# Patient Record
Sex: FEMALE | Race: WHITE | Employment: FULL TIME | URBAN - NONMETROPOLITAN AREA
[De-identification: names, ages, dates, MRNs, and addresses within clinical notes are randomized per-mention and may not be internally consistent; named-entity substitution may affect disease eponyms.]

---

## 2017-03-23 ENCOUNTER — MED REC SCAN ONLY (OUTPATIENT)
Dept: FAMILY MEDICINE CLINIC | Facility: CLINIC | Age: 62
End: 2017-03-23

## 2017-04-27 ENCOUNTER — TELEPHONE (OUTPATIENT)
Dept: FAMILY MEDICINE CLINIC | Facility: CLINIC | Age: 62
End: 2017-04-27

## 2017-04-27 DIAGNOSIS — Z12.39 ENCOUNTER FOR SCREENING BREAST EXAMINATION: Primary | ICD-10-CM

## 2017-04-27 DIAGNOSIS — Z12.31 ENCOUNTER FOR SCREENING MAMMOGRAM FOR BREAST CANCER: ICD-10-CM

## 2017-05-10 ENCOUNTER — APPOINTMENT (OUTPATIENT)
Dept: LAB | Age: 62
End: 2017-05-10
Attending: FAMILY MEDICINE
Payer: COMMERCIAL

## 2017-05-10 DIAGNOSIS — E11.9 DIABETES MELLITUS WITHOUT COMPLICATION (HCC): ICD-10-CM

## 2017-05-10 DIAGNOSIS — E11.9 TYPE 2 DIABETES MELLITUS WITHOUT COMPLICATION (HCC): ICD-10-CM

## 2017-05-10 DIAGNOSIS — E11.9 DIABETES MELLITUS WITHOUT COMPLICATION (HCC): Primary | ICD-10-CM

## 2017-05-10 PROCEDURE — 36415 COLL VENOUS BLD VENIPUNCTURE: CPT | Performed by: FAMILY MEDICINE

## 2017-05-11 NOTE — PROGRESS NOTES
Quick Note:    Notify urinary microalbumin is normal.   This test is an early indicator of kidney disease. Repeat in 1 year. Hemoglobin A1c is elevated. I believe Lencho Dong has an appointment scheduled soon.  If not needs to make one.  ______

## 2017-05-12 ENCOUNTER — TELEPHONE (OUTPATIENT)
Dept: FAMILY MEDICINE CLINIC | Facility: CLINIC | Age: 62
End: 2017-05-12

## 2017-05-12 NOTE — TELEPHONE ENCOUNTER
Calling the patient with her results- she is coming in Monday   She knew her A1C was going to be bad

## 2017-05-15 ENCOUNTER — APPOINTMENT (OUTPATIENT)
Dept: LAB | Age: 62
End: 2017-05-15
Attending: FAMILY MEDICINE
Payer: COMMERCIAL

## 2017-05-15 ENCOUNTER — OFFICE VISIT (OUTPATIENT)
Dept: FAMILY MEDICINE CLINIC | Facility: CLINIC | Age: 62
End: 2017-05-15

## 2017-05-15 ENCOUNTER — TELEPHONE (OUTPATIENT)
Dept: FAMILY MEDICINE CLINIC | Facility: CLINIC | Age: 62
End: 2017-05-15

## 2017-05-15 VITALS
HEART RATE: 80 BPM | SYSTOLIC BLOOD PRESSURE: 120 MMHG | DIASTOLIC BLOOD PRESSURE: 70 MMHG | TEMPERATURE: 98 F | HEIGHT: 62 IN | BODY MASS INDEX: 35.61 KG/M2 | WEIGHT: 193.5 LBS | OXYGEN SATURATION: 97 %

## 2017-05-15 DIAGNOSIS — E11.9 TYPE 2 DIABETES MELLITUS WITHOUT COMPLICATION, UNSPECIFIED LONG TERM INSULIN USE STATUS: Primary | ICD-10-CM

## 2017-05-15 DIAGNOSIS — IMO0001 UNCONTROLLED TYPE 2 DIABETES MELLITUS WITHOUT COMPLICATION, WITH LONG-TERM CURRENT USE OF INSULIN: Primary | ICD-10-CM

## 2017-05-15 DIAGNOSIS — K42.9 UMBILICAL HERNIA WITHOUT OBSTRUCTION AND WITHOUT GANGRENE: ICD-10-CM

## 2017-05-15 DIAGNOSIS — IMO0001 UNCONTROLLED TYPE 2 DIABETES MELLITUS WITHOUT COMPLICATION, WITH LONG-TERM CURRENT USE OF INSULIN: ICD-10-CM

## 2017-05-15 PROCEDURE — 36415 COLL VENOUS BLD VENIPUNCTURE: CPT

## 2017-05-15 PROCEDURE — 99214 OFFICE O/P EST MOD 30 MIN: CPT | Performed by: FAMILY MEDICINE

## 2017-05-15 PROCEDURE — 80053 COMPREHEN METABOLIC PANEL: CPT

## 2017-05-15 PROCEDURE — 36415 COLL VENOUS BLD VENIPUNCTURE: CPT | Performed by: FAMILY MEDICINE

## 2017-05-15 PROCEDURE — 80061 LIPID PANEL: CPT

## 2017-05-15 RX ORDER — LISINOPRIL 5 MG/1
5 TABLET ORAL DAILY
COMMUNITY
End: 2017-05-15

## 2017-05-15 RX ORDER — GLIPIZIDE 5 MG/1
5 TABLET ORAL
COMMUNITY
End: 2017-05-15 | Stop reason: ALTCHOICE

## 2017-05-15 RX ORDER — LORATADINE 10 MG/1
10 TABLET ORAL AS NEEDED
Status: ON HOLD | COMMUNITY
End: 2017-07-03

## 2017-05-15 RX ORDER — METOPROLOL TARTRATE 50 MG/1
50 TABLET, FILM COATED ORAL 2 TIMES DAILY
COMMUNITY
End: 2017-05-15

## 2017-05-15 RX ORDER — LISINOPRIL 5 MG/1
5 TABLET ORAL DAILY
Qty: 90 TABLET | Status: SHIPPED | OUTPATIENT
Start: 2017-05-15 | End: 2017-12-06

## 2017-05-15 RX ORDER — METFORMIN HYDROCHLORIDE EXTENDED-RELEASE TABLETS 1000 MG/1
TABLET, FILM COATED, EXTENDED RELEASE ORAL
Qty: 60 TABLET | Refills: 5 | Status: SHIPPED | OUTPATIENT
Start: 2017-05-15 | End: 2017-05-19 | Stop reason: CLARIF

## 2017-05-15 RX ORDER — ALPRAZOLAM 0.25 MG/1
0.25 TABLET ORAL EVERY 6 HOURS PRN
Qty: 30 TABLET | Refills: 0 | Status: SHIPPED | OUTPATIENT
Start: 2017-05-15 | End: 2021-03-23 | Stop reason: ALTCHOICE

## 2017-05-15 RX ORDER — METOPROLOL TARTRATE 50 MG/1
50 TABLET, FILM COATED ORAL 2 TIMES DAILY
Qty: 180 TABLET | Refills: 3 | Status: SHIPPED | OUTPATIENT
Start: 2017-05-15 | End: 2017-12-06

## 2017-05-15 RX ORDER — MAG HYDROX/ALUMINUM HYD/SIMETH 400-400-40
5000 SUSPENSION, ORAL (FINAL DOSE FORM) ORAL DAILY
COMMUNITY
End: 2018-03-06 | Stop reason: ALTCHOICE

## 2017-05-15 NOTE — TELEPHONE ENCOUNTER
NEED REFERRAL FOR MAMMO SINCE SHE HAS HMO, SHE WENT TO HAVE IT DONE TODAY & THEY CAN NOT DO IT SINCE THERE IS NO REFERRAL, PLEASE SEND THIS OVER SO SHE CAN RESCHED MAMMO    Order is in the chart- not sure where the patient is going?   Calling the patient- l

## 2017-05-15 NOTE — PROGRESS NOTES
Jet Walter is a 63year old female. Patient presents with: Other: BP check, DM check, fasting labs, due for foot exam, URINE micro. ...room 2      HPI:   Patient's A1c was elevated at 9.7. She had been on Metformin and glipizide.   In the past she had 01/01/2002    Smokeless Status: Never Used                        Comment: QUIT SMOKING IN 2002    Alcohol Use: No                 REVIEW OF SYSTEMS:   GENERAL HEALTH: feels well otherwise  SKIN: denies any unusual skin lesions or rashes  RESPIRATORY: lenny ASSESSMENT AND PLAN:     Uncontrolled type 2 diabetes mellitus without complication, with long-term current use of insulin (hcc)  (primary encounter diagnosis)  Umbilical hernia without obstruction and without gangrene    Recommend stopping the

## 2017-05-15 NOTE — TELEPHONE ENCOUNTER
WAS PUT ON INSULIN, IS DR TAKING HER OFF METFORMIN? PHARMACY DID NOT HAVE METFORMIN FOR HER TO ? Refill was not requested for the metformin previously.  Sending into [facility]

## 2017-05-16 ENCOUNTER — HOSPITAL ENCOUNTER (OUTPATIENT)
Dept: MAMMOGRAPHY | Age: 62
Discharge: HOME | End: 2017-05-16
Attending: FAMILY MEDICINE
Payer: COMMERCIAL

## 2017-05-16 DIAGNOSIS — Z12.31 ENCOUNTER FOR SCREENING MAMMOGRAM FOR BREAST CANCER: ICD-10-CM

## 2017-05-16 PROCEDURE — 77067 SCR MAMMO BI INCL CAD: CPT | Performed by: FAMILY MEDICINE

## 2017-05-16 NOTE — PROGRESS NOTES
Quick Note:    Notify lipids normal. Recheck in 6 months. Notify chem normal including kidney function, liver function, electrolytes and nutritional markers.   Recheck comprehensive panel in 6 months.    ______

## 2017-05-17 ENCOUNTER — TELEPHONE (OUTPATIENT)
Dept: FAMILY MEDICINE CLINIC | Facility: CLINIC | Age: 62
End: 2017-05-17

## 2017-05-17 NOTE — TELEPHONE ENCOUNTER
I called the patient and she advised that she picked up her metformin and it is different than what she has been taking. It was more expensive and they could only give her a partial fill.        The refill I sent was what was in the chart and per the [facility]

## 2017-05-18 NOTE — TELEPHONE ENCOUNTER
I called the patient and spoke to her - he advised that she will not be available until tomorrow   I asked that he have her call me when she can

## 2017-05-19 NOTE — TELEPHONE ENCOUNTER
I called [facility]- spoke to [facility]- she confirmed that the patient previously was on the regular Metformin 1000mg BID  I corrected our list and sent a new script

## 2017-05-19 NOTE — TELEPHONE ENCOUNTER
I called Vivienne Fatima and I explained.  She advised that Dr Дмитрий Mitchell is with the [facility] but she thought that he was prescribing the metformin ER- she wrote it down (she didn't save the bottle)  She did not  the script that we sent in so she is asking that we lorena

## 2017-05-22 ENCOUNTER — NURSE ONLY (OUTPATIENT)
Dept: ENDOCRINOLOGY CLINIC | Facility: CLINIC | Age: 62
End: 2017-05-22

## 2017-05-22 VITALS — WEIGHT: 190.13 LBS | DIASTOLIC BLOOD PRESSURE: 58 MMHG | BODY MASS INDEX: 35 KG/M2 | SYSTOLIC BLOOD PRESSURE: 102 MMHG

## 2017-05-22 DIAGNOSIS — IMO0001 UNCONTROLLED TYPE 2 DIABETES MELLITUS WITHOUT COMPLICATION, WITH LONG-TERM CURRENT USE OF INSULIN: Primary | ICD-10-CM

## 2017-05-22 PROCEDURE — G0108 DIAB MANAGE TRN  PER INDIV: HCPCS | Performed by: DIETITIAN, REGISTERED

## 2017-05-22 RX ORDER — FLUOCINONIDE 1 MG/G
CREAM TOPICAL
COMMUNITY
End: 2017-06-19

## 2017-05-22 RX ORDER — METFORMIN HYDROCHLORIDE 500 MG/1
500 TABLET, EXTENDED RELEASE ORAL
Qty: 120 TABLET | Refills: 6 | Status: SHIPPED | OUTPATIENT
Start: 2017-05-22 | End: 2017-06-19

## 2017-05-22 RX ORDER — FLUOCINONIDE 0.5 MG/ML
SOLUTION TOPICAL
Qty: 1 BOTTLE | Refills: 0 | COMMUNITY
Start: 2017-05-22 | End: 2019-08-20

## 2017-05-22 NOTE — PROGRESS NOTES
Doraandi Walter  : [id number] attended Step 1 Diabetic Education:    Date: 2017   Start time: 11:00am End time: 12:30pm    /58 mmHg  Wt 190 lb 1.6 oz      HEMOGLOBIN A1C (no units)   Date Value   2015 7.9   ----------  HGBA1C (%)   Date Jaclyn Pre-meal  2 Hour Post-prandial 140-180 -160  Demonstrated ability to perform blood glucose testing on: [employer] Contour Next EZ  Glucose today was 214 mg/dL (3 hrs post-breakfast)     Problem Solving: Prevention,detection and treatment of acute co

## 2017-06-05 ENCOUNTER — NURSE ONLY (OUTPATIENT)
Dept: ENDOCRINOLOGY CLINIC | Facility: CLINIC | Age: 62
End: 2017-06-05

## 2017-06-05 ENCOUNTER — TELEPHONE (OUTPATIENT)
Dept: ENDOCRINOLOGY CLINIC | Facility: CLINIC | Age: 62
End: 2017-06-05

## 2017-06-05 DIAGNOSIS — IMO0001 UNCONTROLLED TYPE 2 DIABETES MELLITUS WITHOUT COMPLICATION, WITHOUT LONG-TERM CURRENT USE OF INSULIN: Primary | ICD-10-CM

## 2017-06-05 PROCEDURE — G0109 DIAB MANAGE TRN IND/GROUP: HCPCS | Performed by: DIETITIAN, REGISTERED

## 2017-06-05 NOTE — TELEPHONE ENCOUNTER
Diego Huerta- I talked with Dr Teresita Cordoba is concerned because this medication is not covered by the insurance and we not supposed to be prescribing medications that are not covered.   Even though she can use the discount card he is not sure if this will become a

## 2017-06-05 NOTE — TELEPHONE ENCOUNTER
Pt. here for class today ; copies of her BG readings are as follows:    Date Breakfast   Lunch    Dinner   Bedtime Comments    Pre Insulin Units Post Pre Insulin Units Post Pre  Insulin Units Post     5/10/17 232             5/17 217  235      262     5/18

## 2017-06-09 ENCOUNTER — TELEPHONE (OUTPATIENT)
Dept: ENDOCRINOLOGY CLINIC | Facility: CLINIC | Age: 62
End: 2017-06-09

## 2017-06-09 NOTE — TELEPHONE ENCOUNTER
Contacted pt.'s pharmacy plan to determine cost of Glyxambi 25-5 mg; for a 30-day supply it would cost $159.37. I called pt. & instructed her to take the discount card to the pharmacy with her to check on the final cost of the prescription.  Sent prescrip

## 2017-06-16 ENCOUNTER — OFFICE VISIT (OUTPATIENT)
Dept: FAMILY MEDICINE CLINIC | Facility: CLINIC | Age: 62
End: 2017-06-16

## 2017-06-16 VITALS
RESPIRATION RATE: 14 BRPM | SYSTOLIC BLOOD PRESSURE: 102 MMHG | WEIGHT: 190 LBS | HEART RATE: 90 BPM | BODY MASS INDEX: 35 KG/M2 | DIASTOLIC BLOOD PRESSURE: 60 MMHG | TEMPERATURE: 98 F | OXYGEN SATURATION: 97 %

## 2017-06-16 DIAGNOSIS — K42.9 UMBILICAL HERNIA WITHOUT OBSTRUCTION AND WITHOUT GANGRENE: Primary | ICD-10-CM

## 2017-06-16 PROCEDURE — 99243 OFF/OP CNSLTJ NEW/EST LOW 30: CPT | Performed by: SURGERY

## 2017-06-16 NOTE — PROGRESS NOTES
Roro Estes is a 63year old female  No chief complaint on file. REFERRED BY    Patient presents with umbilical hernia present for many months. Patient states she noticed a bulge when she was bathing.   States the bulge comes and goes she sought the times daily Disp: 150 strip Rfl: 11   RIZWAN MICROLET LANCETS Does not apply Misc Test 4 times daily Disp: 2 Box Rfl: 11   MetFORMIN HCl 1000 MG Oral Tab Take 1 tablet (1,000 mg total) by mouth 2 (two) times daily with meals.  Disp: 180 tablet Rfl: 1   flyat fever or chills  SKIN: denies any unusual skin rashes or jaundice  HEENT: denies nasal congestion, sinus pain or sore throat; hearing loss negative,   EYES , no diplopia or vision changes  RESPIRATORY: denies shortness of breath, wheezing or cough   CARDIO Moderate CHD risk    45-60 mg/dL  Average CHD risk    60-75 md/dL  Below average CHD risk       LDL Cholesterol Date: 05/15/2017   Value: 65  Ref Range <130 mg/dL Status: Final   VLDL Date: 05/15/2017   Value: 24  Ref Range 5-40 mg/dL Status: Final   Chol/ 14-54 U/L Status: Final   Bilirubin, Total Date: 05/15/2017   Value: 0.3  Ref Range 0.1-2.0 mg/dL Status: Final   Total Protein Date: 05/15/2017   Value: 7.5  Ref Range 6.1-8.3 g/dL Status: Final   Albumin Date: 05/15/2017   Value: 3.8  Ref Range 3.5-4.8 g

## 2017-06-19 ENCOUNTER — TELEPHONE (OUTPATIENT)
Dept: SURGERY | Facility: CLINIC | Age: 62
End: 2017-06-19

## 2017-06-19 DIAGNOSIS — K42.9 UMBILICAL HERNIA WITHOUT OBSTRUCTION OR GANGRENE: Primary | ICD-10-CM

## 2017-06-21 ENCOUNTER — TELEPHONE (OUTPATIENT)
Dept: FAMILY MEDICINE CLINIC | Facility: CLINIC | Age: 62
End: 2017-06-21

## 2017-06-21 DIAGNOSIS — K42.9 UMBILICAL HERNIA WITHOUT OBSTRUCTION AND WITHOUT GANGRENE: Primary | ICD-10-CM

## 2017-06-27 ENCOUNTER — NURSE ONLY (OUTPATIENT)
Dept: ENDOCRINOLOGY CLINIC | Facility: CLINIC | Age: 62
End: 2017-06-27

## 2017-06-27 PROCEDURE — G0109 DIAB MANAGE TRN IND/GROUP: HCPCS | Performed by: DIETITIAN, REGISTERED

## 2017-06-28 ENCOUNTER — TELEPHONE (OUTPATIENT)
Dept: ENDOCRINOLOGY CLINIC | Facility: CLINIC | Age: 62
End: 2017-06-28

## 2017-06-28 ENCOUNTER — TELEPHONE (OUTPATIENT)
Dept: FAMILY MEDICINE CLINIC | Facility: CLINIC | Age: 62
End: 2017-06-28

## 2017-06-28 NOTE — TELEPHONE ENCOUNTER
Pt. came for Step 3 class; readings are as follows:    Date Breakfast   Lunch    Dinner   Bedtime Comments    Pre Insulin Units Post Pre Insulin Units Post Pre  Insulin Units Post     6/7/17 245  324           6/9 307         303    6/10 261             6/

## 2017-06-30 ENCOUNTER — NURSE ONLY (OUTPATIENT)
Dept: FAMILY MEDICINE CLINIC | Facility: CLINIC | Age: 62
End: 2017-06-30

## 2017-06-30 DIAGNOSIS — Z01.818 PRE-OP TESTING: Primary | ICD-10-CM

## 2017-06-30 PROCEDURE — 93000 ELECTROCARDIOGRAM COMPLETE: CPT | Performed by: FAMILY MEDICINE

## 2017-07-01 ENCOUNTER — ANESTHESIA EVENT (OUTPATIENT)
Dept: SURGERY | Facility: HOSPITAL | Age: 62
End: 2017-07-01
Payer: COMMERCIAL

## 2017-07-03 ENCOUNTER — ANESTHESIA (OUTPATIENT)
Dept: SURGERY | Facility: HOSPITAL | Age: 62
End: 2017-07-03
Payer: COMMERCIAL

## 2017-07-03 ENCOUNTER — SURGERY (OUTPATIENT)
Age: 62
End: 2017-07-03

## 2017-07-03 ENCOUNTER — HOSPITAL ENCOUNTER (OUTPATIENT)
Facility: HOSPITAL | Age: 62
Setting detail: HOSPITAL OUTPATIENT SURGERY
Discharge: HOME | End: 2017-07-03
Attending: SURGERY | Admitting: SURGERY
Payer: COMMERCIAL

## 2017-07-03 VITALS
RESPIRATION RATE: 18 BRPM | BODY MASS INDEX: 34.6 KG/M2 | TEMPERATURE: 99 F | HEART RATE: 68 BPM | SYSTOLIC BLOOD PRESSURE: 106 MMHG | DIASTOLIC BLOOD PRESSURE: 49 MMHG | HEIGHT: 62 IN | OXYGEN SATURATION: 93 % | WEIGHT: 188 LBS

## 2017-07-03 DIAGNOSIS — K42.9 UMBILICAL HERNIA WITHOUT OBSTRUCTION OR GANGRENE: ICD-10-CM

## 2017-07-03 LAB
GLUCOSE BLD-MCNC: 132 MG/DL (ref 65–99)
GLUCOSE BLD-MCNC: 159 MG/DL (ref 65–99)

## 2017-07-03 PROCEDURE — 82962 GLUCOSE BLOOD TEST: CPT

## 2017-07-03 PROCEDURE — 0WUF0JZ SUPPLEMENT ABDOMINAL WALL WITH SYNTHETIC SUBSTITUTE, OPEN APPROACH: ICD-10-PCS | Performed by: SURGERY

## 2017-07-03 DEVICE — VENTRALEX ST HERNIA PATCH
Type: IMPLANTABLE DEVICE | Site: UMBILICAL | Status: FUNCTIONAL
Brand: VENTRALEX ST HERNIA PATCH

## 2017-07-03 RX ORDER — DEXTROSE MONOHYDRATE 25 G/50ML
50 INJECTION, SOLUTION INTRAVENOUS
Status: DISCONTINUED | OUTPATIENT
Start: 2017-07-03 | End: 2017-07-03 | Stop reason: HOSPADM

## 2017-07-03 RX ORDER — NALOXONE HYDROCHLORIDE 0.4 MG/ML
80 INJECTION, SOLUTION INTRAMUSCULAR; INTRAVENOUS; SUBCUTANEOUS AS NEEDED
Status: DISCONTINUED | OUTPATIENT
Start: 2017-07-03 | End: 2017-07-03

## 2017-07-03 RX ORDER — SODIUM CHLORIDE, SODIUM LACTATE, POTASSIUM CHLORIDE, CALCIUM CHLORIDE 600; 310; 30; 20 MG/100ML; MG/100ML; MG/100ML; MG/100ML
INJECTION, SOLUTION INTRAVENOUS CONTINUOUS
Status: DISCONTINUED | OUTPATIENT
Start: 2017-07-03 | End: 2017-07-03

## 2017-07-03 RX ORDER — HYDROCODONE BITARTRATE AND ACETAMINOPHEN 5; 325 MG/1; MG/1
1 TABLET ORAL EVERY 6 HOURS PRN
Qty: 30 TABLET | Refills: 0 | Status: SHIPPED | OUTPATIENT
Start: 2017-07-03 | End: 2017-12-06 | Stop reason: ALTCHOICE

## 2017-07-03 RX ORDER — METOCLOPRAMIDE HYDROCHLORIDE 5 MG/ML
10 INJECTION INTRAMUSCULAR; INTRAVENOUS AS NEEDED
Status: DISCONTINUED | OUTPATIENT
Start: 2017-07-03 | End: 2017-07-03

## 2017-07-03 RX ORDER — HYDROMORPHONE HYDROCHLORIDE 1 MG/ML
0.4 INJECTION, SOLUTION INTRAMUSCULAR; INTRAVENOUS; SUBCUTANEOUS EVERY 5 MIN PRN
Status: DISCONTINUED | OUTPATIENT
Start: 2017-07-03 | End: 2017-07-03

## 2017-07-03 RX ORDER — ONDANSETRON 2 MG/ML
4 INJECTION INTRAMUSCULAR; INTRAVENOUS AS NEEDED
Status: DISCONTINUED | OUTPATIENT
Start: 2017-07-03 | End: 2017-07-03

## 2017-07-03 RX ORDER — SCOLOPAMINE TRANSDERMAL SYSTEM 1 MG/1
PATCH, EXTENDED RELEASE TRANSDERMAL
Status: COMPLETED
Start: 2017-07-03 | End: 2017-07-03

## 2017-07-03 RX ORDER — HYDROCODONE BITARTRATE AND ACETAMINOPHEN 5; 325 MG/1; MG/1
1 TABLET ORAL AS NEEDED
Status: DISCONTINUED | OUTPATIENT
Start: 2017-07-03 | End: 2017-07-03

## 2017-07-03 RX ORDER — HEPARIN SODIUM 5000 [USP'U]/ML
5000 INJECTION, SOLUTION INTRAVENOUS; SUBCUTANEOUS ONCE
Status: COMPLETED | OUTPATIENT
Start: 2017-07-03 | End: 2017-07-03

## 2017-07-03 RX ORDER — MIDAZOLAM HYDROCHLORIDE 1 MG/ML
1 INJECTION INTRAMUSCULAR; INTRAVENOUS EVERY 5 MIN PRN
Status: DISCONTINUED | OUTPATIENT
Start: 2017-07-03 | End: 2017-07-03

## 2017-07-03 RX ORDER — HEPARIN SODIUM 5000 [USP'U]/ML
INJECTION, SOLUTION INTRAVENOUS; SUBCUTANEOUS
Status: DISCONTINUED
Start: 2017-07-03 | End: 2017-07-03

## 2017-07-03 RX ORDER — BUPIVACAINE HYDROCHLORIDE 5 MG/ML
INJECTION, SOLUTION EPIDURAL; INTRACAUDAL AS NEEDED
Status: DISCONTINUED | OUTPATIENT
Start: 2017-07-03 | End: 2017-07-03 | Stop reason: HOSPADM

## 2017-07-03 RX ORDER — DEXTROSE MONOHYDRATE 25 G/50ML
50 INJECTION, SOLUTION INTRAVENOUS
Status: DISCONTINUED | OUTPATIENT
Start: 2017-07-03 | End: 2017-07-03

## 2017-07-03 RX ORDER — SCOLOPAMINE TRANSDERMAL SYSTEM 1 MG/1
1 PATCH, EXTENDED RELEASE TRANSDERMAL ONCE
Status: DISCONTINUED | OUTPATIENT
Start: 2017-07-03 | End: 2017-07-03 | Stop reason: HOSPADM

## 2017-07-03 RX ORDER — HYDROCODONE BITARTRATE AND ACETAMINOPHEN 5; 325 MG/1; MG/1
2 TABLET ORAL AS NEEDED
Status: DISCONTINUED | OUTPATIENT
Start: 2017-07-03 | End: 2017-07-03

## 2017-07-03 RX ORDER — MEPERIDINE HYDROCHLORIDE 25 MG/ML
12.5 INJECTION INTRAMUSCULAR; INTRAVENOUS; SUBCUTANEOUS AS NEEDED
Status: DISCONTINUED | OUTPATIENT
Start: 2017-07-03 | End: 2017-07-03

## 2017-07-03 RX ORDER — HYDROMORPHONE HYDROCHLORIDE 1 MG/ML
INJECTION, SOLUTION INTRAMUSCULAR; INTRAVENOUS; SUBCUTANEOUS
Status: COMPLETED
Start: 2017-07-03 | End: 2017-07-03

## 2017-07-03 RX ORDER — CEFAZOLIN SODIUM 1 G/3ML
INJECTION, POWDER, FOR SOLUTION INTRAMUSCULAR; INTRAVENOUS
Status: DISCONTINUED | OUTPATIENT
Start: 2017-07-03 | End: 2017-07-03 | Stop reason: HOSPADM

## 2017-07-03 NOTE — BRIEF OP NOTE
Pre-Operative Diagnosis: Umbilical hernia without obstruction or gangrene [K42.9]     Post-Operative Diagnosis: Umbilical hernia without obstruction or gangrene [K42.9]     Procedure Performed:   Procedure(s):   UMBILICAL HERNIORRHAPHY WITH MESH    Surge

## 2017-07-03 NOTE — ANESTHESIA POSTPROCEDURE EVALUATION
Paulette Patient Status:  Hospital Outpatient Surgery   Age/Gender 63year old female MRN [id number]   [facility] SURGERY Attending Hank Cespedes, [address] [facility] Day # 0 PCP Pily Sotelo DO       Anesthesia Post-op N

## 2017-07-03 NOTE — H&P
Patient presents with umbilical hernia present for many months. Patient states she noticed a bulge when she was bathing. States the bulge comes and goes she sought the attention of Dr. Vanice Gaucher who identified it is an umbilical hernia.   Patient states t 150 strip Rfl: 11   RIZWAN MICROLET LANCETS Does not apply Misc Test 4 times daily Disp: 2 Box Rfl: 11   MetFORMIN HCl 1000 MG Oral Tab Take 1 tablet (1,000 mg total) by mouth 2 (two) times daily with meals.  Disp: 180 tablet Rfl: 1   loratadine 10 MG Oral T weight loss, no fever or chills  SKIN: denies any unusual skin rashes or jaundice  HEENT: denies nasal congestion, sinus pain or sore throat; hearing loss negative,   EYES , no diplopia or vision changes  RESPIRATORY: denies shortness of breath, wheezing o High CHD risk    35-45 mg/dL  Moderate CHD risk    45-60 mg/dL  Average CHD risk    60-75 md/dL  Below average CHD risk      LDL Cholesterol Date: 05/15/2017   Value: 65  Ref Range <130 mg/dL Status: Final   VLDL Date: 05/15/2017   Value: 24  Ref Range 5-4 Value: 9.4  Ref Range 8.3-10.3 mg/dL Status: Final     Comment:   Total Calciums are not corrected for effects of low albumin.  If needed, use the following correction formula.       Corrected Calcium Formula:      ((4.0 - Albumin) x 0.8 + Calcium    Note

## 2017-07-03 NOTE — OPERATIVE REPORT
[facility]    PATIENT'S NAME: Austin Li   ATTENDING PHYSICIAN: Valeri Ca D.O.   OPERATING PHYSICIAN: Valeri Ca D.O.   PATIENT ACCOUNT#:   [de-identified]    LOCATION:  PACU [facility] PACU 3 EDWP 10  MEDICAL RECORD #:   [id number]       DATE OF BIRTH the procedure. Sterile dressing was applied. The patient was transported from the operative suite to recovery area in stable condition.       Dictated By Ирина Reyes D.O.  d: 07/03/2017 10:07:48  t: 07/03/2017 11:24:04  Job [id number]  JARTIE/

## 2017-07-03 NOTE — ANESTHESIA PREPROCEDURE EVALUATION
PRE-OP EVALUATION    Patient Name: Farideh Carrera    Pre-op Diagnosis: Umbilical hernia without obstruction or gangrene [K42.9]    Procedure(s):   UMBILICAL HERNIORRHAPHY, POSSIBLE MESH    Surgeon(s) and Role:     Erlin Wolf, DO - Primary    Pre-op vit HYSTERECTOMY      Comment: lise, bso  No date: LUMPECTOMY LEFT      Comment: two can't remember when both benign   No date: OTHER      Comment: palate surgery for sleep apnea  No date: TONSILLECTOMY     Smoking status: Former Smoker  1.00 Packs/day  For 30.

## 2017-07-05 ENCOUNTER — TELEPHONE (OUTPATIENT)
Dept: FAMILY MEDICINE CLINIC | Facility: CLINIC | Age: 62
End: 2017-07-05

## 2017-07-05 RX ORDER — FUROSEMIDE 20 MG/1
TABLET ORAL
Qty: 2 TABLET | Refills: 0 | Status: SHIPPED | OUTPATIENT
Start: 2017-07-05 | End: 2017-12-06 | Stop reason: ALTCHOICE

## 2017-07-05 NOTE — TELEPHONE ENCOUNTER
I spoke to the pt. She is s/p umbilical hernia on 6/3/08. She noticed this morning after she got up her feet are swollen. Pt states her calves are a little swollen. Pt states she was up on her feet a lot yesterday.  She has been trying to put her feet up to

## 2017-07-05 NOTE — TELEPHONE ENCOUNTER
Had surgery on Monday, and today her feet and legs are really swollen. Wants to know if this is normal because of surgery or if she should go to doctor?

## 2017-07-11 ENCOUNTER — OFFICE VISIT (OUTPATIENT)
Dept: FAMILY MEDICINE CLINIC | Facility: CLINIC | Age: 62
End: 2017-07-11

## 2017-07-11 VITALS — DIASTOLIC BLOOD PRESSURE: 64 MMHG | TEMPERATURE: 98 F | SYSTOLIC BLOOD PRESSURE: 110 MMHG

## 2017-07-11 DIAGNOSIS — R10.31 RIGHT LOWER QUADRANT ABDOMINAL PAIN: Primary | ICD-10-CM

## 2017-07-11 PROCEDURE — 99214 OFFICE O/P EST MOD 30 MIN: CPT | Performed by: SURGERY

## 2017-07-17 ENCOUNTER — TELEPHONE (OUTPATIENT)
Dept: FAMILY MEDICINE CLINIC | Facility: CLINIC | Age: 62
End: 2017-07-17

## 2017-07-17 RX ORDER — PREDNISONE 10 MG/1
TABLET ORAL
Qty: 15 TABLET | Refills: 0 | Status: SHIPPED | OUTPATIENT
Start: 2017-07-17 | End: 2017-12-06 | Stop reason: ALTCHOICE

## 2017-07-17 NOTE — TELEPHONE ENCOUNTER
Voicemail-  Patient was under impression that Dr Michelle Castle was to order prednisone. It is not at [facility] in [place].    Patient is due to have a CAT scan this week

## 2017-07-19 ENCOUNTER — HOSPITAL ENCOUNTER (OUTPATIENT)
Dept: CT IMAGING | Age: 62
Discharge: HOME | End: 2017-07-19
Attending: SURGERY
Payer: COMMERCIAL

## 2017-07-19 DIAGNOSIS — R10.31 RIGHT LOWER QUADRANT ABDOMINAL PAIN: ICD-10-CM

## 2017-07-19 PROCEDURE — 74177 CT ABD & PELVIS W/CONTRAST: CPT | Performed by: SURGERY

## 2017-08-11 ENCOUNTER — TELEPHONE (OUTPATIENT)
Dept: FAMILY MEDICINE CLINIC | Facility: CLINIC | Age: 62
End: 2017-08-11

## 2017-08-11 DIAGNOSIS — E11.9 DIABETES MELLITUS WITHOUT COMPLICATION (HCC): Primary | ICD-10-CM

## 2017-08-11 NOTE — TELEPHONE ENCOUNTER
Last one was in May 2017 - she saw you for an OV, but there was no indication when she should check next     Please advise

## 2017-08-11 NOTE — TELEPHONE ENCOUNTER
Future Appointments  Date Time Provider Gladis Sutton   8/14/2017 10:00 AM Blanca Love, RN, CDE [facility]

## 2017-08-14 NOTE — TELEPHONE ENCOUNTER
Future Appointments  Date Time Provider Gladis Sutton   8/21/2017 11:00 AM Aviva Benson RN, CDE [facility]

## 2017-08-15 ENCOUNTER — LABORATORY ENCOUNTER (OUTPATIENT)
Dept: LAB | Age: 62
End: 2017-08-15
Attending: FAMILY MEDICINE
Payer: COMMERCIAL

## 2017-08-15 DIAGNOSIS — E11.9 DIABETES MELLITUS WITHOUT COMPLICATION (HCC): ICD-10-CM

## 2017-08-15 LAB
EST. AVERAGE GLUCOSE BLD GHB EST-MCNC: 183 MG/DL (ref 68–126)
HBA1C MFR BLD HPLC: 8 % (ref ?–5.7)

## 2017-08-15 PROCEDURE — 36415 COLL VENOUS BLD VENIPUNCTURE: CPT

## 2017-08-15 PROCEDURE — 83036 HEMOGLOBIN GLYCOSYLATED A1C: CPT

## 2017-08-16 NOTE — PROGRESS NOTES
Notify hemoglobin A1c is much better. Her goal is to get the level below 7. Recommend recheck in another 3 months. Please forward a copy of the lab to [facility].

## 2017-08-21 ENCOUNTER — NURSE ONLY (OUTPATIENT)
Dept: ENDOCRINOLOGY CLINIC | Facility: CLINIC | Age: 62
End: 2017-08-21

## 2017-08-21 DIAGNOSIS — E11.9 TYPE 2 DIABETES MELLITUS WITHOUT COMPLICATION, WITHOUT LONG-TERM CURRENT USE OF INSULIN (HCC): ICD-10-CM

## 2017-08-21 PROCEDURE — G0108 DIAB MANAGE TRN  PER INDIV: HCPCS | Performed by: DIETITIAN, REGISTERED

## 2017-08-22 VITALS — BODY MASS INDEX: 35 KG/M2 | WEIGHT: 188.81 LBS

## 2017-08-22 NOTE — PROGRESS NOTES
Have Lillian see me in the next couple weeks. Repeat her lipid profile, chemistry profile and hemoglobin A1c in mid November.

## 2017-08-22 NOTE — PROGRESS NOTES
Peter Quevedo  YBE:[birth year] attended Step 4 Diabetic Education:    Date: 8/22/2017  Start time: 11am End time: 12pm      BP (P) 90/56  Wt: 188.8 lbs  Weight Change:  Loss of 2 lbs      HEMOGLOBIN A1C (no units)   Date Value   05/04/2015 7.9   ---------- or after dinner    Taking Medication:  Accomplishment: Has been taking Lantus 27 units at bedtime & Glyxambi 25-5 mg daily    On-going Goal: Needs to continue increasing dose by 2 units every 3 days until FBG reaches 120 mg/dL.     Your patient has chosen t

## 2017-09-19 ENCOUNTER — TELEPHONE (OUTPATIENT)
Dept: FAMILY MEDICINE CLINIC | Facility: CLINIC | Age: 62
End: 2017-09-19

## 2017-09-19 NOTE — TELEPHONE ENCOUNTER
Pt would like the Ariana 4mm by 32g needles, the ones she has are 8mm and leaves bruises, call Bernabe Marshall

## 2017-10-17 ENCOUNTER — TELEPHONE (OUTPATIENT)
Dept: FAMILY MEDICINE CLINIC | Facility: CLINIC | Age: 62
End: 2017-10-17

## 2017-11-21 ENCOUNTER — NURSE ONLY (OUTPATIENT)
Dept: ENDOCRINOLOGY CLINIC | Facility: CLINIC | Age: 62
End: 2017-11-21

## 2017-11-21 DIAGNOSIS — E11.9 TYPE 2 DIABETES MELLITUS WITHOUT COMPLICATION, WITHOUT LONG-TERM CURRENT USE OF INSULIN (HCC): ICD-10-CM

## 2017-11-21 PROCEDURE — G0108 DIAB MANAGE TRN  PER INDIV: HCPCS | Performed by: DIETITIAN, REGISTERED

## 2017-11-22 VITALS — DIASTOLIC BLOOD PRESSURE: 64 MMHG | SYSTOLIC BLOOD PRESSURE: 96 MMHG | BODY MASS INDEX: 34 KG/M2 | WEIGHT: 187.19 LBS

## 2017-11-22 NOTE — PROGRESS NOTES
Viviana Cabrera  :  was seen for Diabetic Education Initial/Follow up:    Date: 2017  Referring MD:Dr. Rita Avila  Start time: 9am End time: 9:30am    Assessment:       Changes since last visit:  - Lifestyle: still unable to find full-ricco Effects/Interactions/Precautions/Missed Doses: Decreases glucose production by the liver. May cause G.I. Side effects if not taken with food. If dose missed, wait until next meal. Hold meds if tests ordered requiring contrast media.     Injectables:   Type/

## 2017-12-06 ENCOUNTER — LABORATORY ENCOUNTER (OUTPATIENT)
Dept: LAB | Age: 62
End: 2017-12-06
Attending: FAMILY MEDICINE
Payer: COMMERCIAL

## 2017-12-06 ENCOUNTER — TELEPHONE (OUTPATIENT)
Dept: FAMILY MEDICINE CLINIC | Facility: CLINIC | Age: 62
End: 2017-12-06

## 2017-12-06 ENCOUNTER — OFFICE VISIT (OUTPATIENT)
Dept: FAMILY MEDICINE CLINIC | Facility: CLINIC | Age: 62
End: 2017-12-06

## 2017-12-06 VITALS
HEART RATE: 73 BPM | OXYGEN SATURATION: 99 % | TEMPERATURE: 98 F | WEIGHT: 184 LBS | SYSTOLIC BLOOD PRESSURE: 120 MMHG | DIASTOLIC BLOOD PRESSURE: 80 MMHG | BODY MASS INDEX: 34 KG/M2

## 2017-12-06 DIAGNOSIS — E11.9 TYPE 2 DIABETES MELLITUS WITHOUT COMPLICATION, WITHOUT LONG-TERM CURRENT USE OF INSULIN (HCC): Primary | ICD-10-CM

## 2017-12-06 DIAGNOSIS — S39.012A STRAIN OF LUMBAR REGION, INITIAL ENCOUNTER: ICD-10-CM

## 2017-12-06 DIAGNOSIS — E11.9 DIABETES MELLITUS WITHOUT COMPLICATION (HCC): ICD-10-CM

## 2017-12-06 DIAGNOSIS — E11.9 TYPE 2 DIABETES MELLITUS WITHOUT COMPLICATION, WITHOUT LONG-TERM CURRENT USE OF INSULIN (HCC): ICD-10-CM

## 2017-12-06 DIAGNOSIS — L65.9 HAIR LOSS: ICD-10-CM

## 2017-12-06 PROCEDURE — 80061 LIPID PANEL: CPT

## 2017-12-06 PROCEDURE — 84403 ASSAY OF TOTAL TESTOSTERONE: CPT

## 2017-12-06 PROCEDURE — 82627 DEHYDROEPIANDROSTERONE: CPT

## 2017-12-06 PROCEDURE — 80053 COMPREHEN METABOLIC PANEL: CPT

## 2017-12-06 PROCEDURE — 36415 COLL VENOUS BLD VENIPUNCTURE: CPT

## 2017-12-06 PROCEDURE — 83036 HEMOGLOBIN GLYCOSYLATED A1C: CPT

## 2017-12-06 PROCEDURE — 99214 OFFICE O/P EST MOD 30 MIN: CPT | Performed by: FAMILY MEDICINE

## 2017-12-06 PROCEDURE — 84443 ASSAY THYROID STIM HORMONE: CPT

## 2017-12-06 RX ORDER — LISINOPRIL 5 MG/1
5 TABLET ORAL DAILY
Qty: 90 TABLET | Status: SHIPPED | OUTPATIENT
Start: 2017-12-06 | End: 2018-03-07 | Stop reason: DRUGHIGH

## 2017-12-06 RX ORDER — METOPROLOL TARTRATE 50 MG/1
50 TABLET, FILM COATED ORAL 2 TIMES DAILY
Qty: 180 TABLET | Refills: 3 | Status: SHIPPED | OUTPATIENT
Start: 2017-12-06 | End: 2018-12-31

## 2017-12-06 NOTE — PROGRESS NOTES
Shilo Wagner is a 63year old female. Patient presents with:   Other: NEEDS K9I-pdejkujwe changes next month and can no longer come here, lower back pain, into RT side-started a couple wks ago--also wondering about testosterone reading-DM nurse said it w daily with meals. Disp: 180 tablet Rfl: 1   Cholecalciferol (VITAMIN D3) 5000 units Oral Cap Take 5,000 Units by mouth daily. Disp:  Rfl:    [DISCONTINUED] lisinopril 5 MG Oral Tab Take 1 tablet (5 mg total) by mouth daily.  Disp: 90 tablet Rfl: prn   [DISC Pulse 73   Temp 98 °F (36.7 °C) (Tympanic)   Wt 184 lb   SpO2 99%   BMI 33.65 kg/m²   GENERAL: well developed, well nourished,in no apparent distress  SKIN: Thinning of the hair in a male pattern of hair loss  HEENT: atraumatic, normocephalic, R TM normal, Visit:  Signed Prescriptions Disp Refills    Empagliflozin-Linagliptin (GLYXAMBI) 25-5 MG Oral Tab 30 tablet 1      Sig: Take 1 tablet by mouth daily. lisinopril 5 MG Oral Tab 90 tablet prn      Sig: Take 1 tablet (5 mg total) by mouth daily.       Met

## 2017-12-06 NOTE — TELEPHONE ENCOUNTER
MetFORMIN HCl 1000 MG Oral Tab  PHARMACY WANTS TO KNOW IF THIS IS A CHANGE IN MEDICATION OR A MISTAKE?   SHE DID HAVE METFORMIN 500 MG TWICE A DAY ER.

## 2017-12-07 DIAGNOSIS — Z00.00 PREVENTATIVE HEALTH CARE: Primary | ICD-10-CM

## 2017-12-07 DIAGNOSIS — I10 ESSENTIAL HYPERTENSION: ICD-10-CM

## 2017-12-07 DIAGNOSIS — Z79.899 ENCOUNTER FOR LONG-TERM (CURRENT) DRUG USE: ICD-10-CM

## 2017-12-07 DIAGNOSIS — E11.9 TYPE 2 DIABETES MELLITUS WITHOUT COMPLICATION, WITHOUT LONG-TERM CURRENT USE OF INSULIN (HCC): ICD-10-CM

## 2017-12-07 NOTE — PROGRESS NOTES
Notify lipids are at goal. Recheck in  6 months. Notify chemistry profile is unremarkable. Recheck in  6 months. Notify HGbA1C is controlled. Recheck in  6 months. Notify TSH normal. Recheck in 1 year.   Testosterone level is normal.

## 2018-01-02 ENCOUNTER — TELEPHONE (OUTPATIENT)
Dept: FAMILY MEDICINE CLINIC | Facility: CLINIC | Age: 63
End: 2018-01-02

## 2018-01-02 RX ORDER — INSULIN GLARGINE 100 [IU]/ML
INJECTION, SOLUTION SUBCUTANEOUS
Qty: 15 ML | Refills: 1 | OUTPATIENT
Start: 2018-01-02

## 2018-01-02 NOTE — TELEPHONE ENCOUNTER
REFILL LANTUS SOLOSTAR PEN, METFORMIN, GLYXAMBI, PEN NEEDLES, TEST STRIPS AND LANCETTES FOR CONTOUR NEXT-EZ   ALL FOR 90 DAYS  [facility]

## 2018-01-09 ENCOUNTER — TELEPHONE (OUTPATIENT)
Dept: FAMILY MEDICINE CLINIC | Facility: CLINIC | Age: 63
End: 2018-01-09

## 2018-01-09 NOTE — TELEPHONE ENCOUNTER
SHE SAID THAT [facility] NEVER RECEIVED THE REQUEST FOR HER DIABETIC SUPPLIES THAT SHE REQUESTED ON THE 2ND.  PLEASE RESEND

## 2018-01-09 NOTE — TELEPHONE ENCOUNTER
I spoke to Madelyn Black at [facility]. He states they did receive the refills for her meds and supplies sent on 1/2/18. He states they need her new insurance card so they can put the meds and supplies through the insurance as the medications are expensive.  Madelyn Black st

## 2018-01-09 NOTE — TELEPHONE ENCOUNTER
Calling the patient- advised     She was confirming that the Metformin s/b 1000mg and I advised that is what her medication list shows    We went over her labs from December

## 2018-01-10 ENCOUNTER — TELEPHONE (OUTPATIENT)
Dept: FAMILY MEDICINE CLINIC | Facility: CLINIC | Age: 63
End: 2018-01-10

## 2018-01-10 RX ORDER — METFORMIN HYDROCHLORIDE 500 MG/1
1000 TABLET, EXTENDED RELEASE ORAL 2 TIMES DAILY WITH MEALS
Qty: 120 TABLET | Refills: 5 | Status: SHIPPED | OUTPATIENT
Start: 2018-01-10 | End: 2018-02-09

## 2018-01-10 NOTE — TELEPHONE ENCOUNTER
I called [facility] and spoke with Keegan Knight to see what she has been getting filled for her metformin?      1/4/18- Metformin 1000mg  1 PO BID    12/6/17- Metformin 1000mg was sent in but on hold   11/5- Metformin ER 500mg  2 tabs BID  10/9- same  9/5- same

## 2018-01-10 NOTE — TELEPHONE ENCOUNTER
ZIA IS SUPPOSE TO GET METFORMIN TIME RELEASE, SHE JUST PICKED IT UP AND IT IS NOT, CAN IT BE RESENT AS TIME RELEASE?   PLEASE CALL ZIA

## 2018-01-22 ENCOUNTER — TELEPHONE (OUTPATIENT)
Dept: FAMILY MEDICINE CLINIC | Facility: CLINIC | Age: 63
End: 2018-01-22

## 2018-01-22 NOTE — TELEPHONE ENCOUNTER
Calling to do prior auth on the Glyxambi 25-5mg tablets for her diabetes     Calling Brittni  [phone]  ID# [id number]    Spoke with Nick Ulloa-   This has been submitted for review- up to 120 hours for decision  Ref# [id number]    Decisions can be checked online

## 2018-01-26 NOTE — TELEPHONE ENCOUNTER
I will inform the patient that the insurance will not approve the medication. She had started this by Mai Plascencia and  thought she was getting samples. He recommends that the patient contact Deanna about the medication.      She is going through th

## 2018-03-06 ENCOUNTER — OFFICE VISIT (OUTPATIENT)
Dept: FAMILY MEDICINE CLINIC | Facility: CLINIC | Age: 63
End: 2018-03-06

## 2018-03-06 ENCOUNTER — MED REC SCAN ONLY (OUTPATIENT)
Dept: FAMILY MEDICINE CLINIC | Facility: CLINIC | Age: 63
End: 2018-03-06

## 2018-03-06 ENCOUNTER — TELEPHONE (OUTPATIENT)
Dept: FAMILY MEDICINE CLINIC | Facility: CLINIC | Age: 63
End: 2018-03-06

## 2018-03-06 VITALS
HEART RATE: 72 BPM | BODY MASS INDEX: 33.09 KG/M2 | WEIGHT: 182.13 LBS | SYSTOLIC BLOOD PRESSURE: 124 MMHG | HEIGHT: 62.25 IN | TEMPERATURE: 97 F | RESPIRATION RATE: 16 BRPM | DIASTOLIC BLOOD PRESSURE: 70 MMHG

## 2018-03-06 DIAGNOSIS — B07.0 PLANTAR WART: ICD-10-CM

## 2018-03-06 DIAGNOSIS — Z87.442 HISTORY OF NEPHROLITHIASIS: ICD-10-CM

## 2018-03-06 DIAGNOSIS — E11.9 TYPE 2 DIABETES MELLITUS WITHOUT COMPLICATION, WITHOUT LONG-TERM CURRENT USE OF INSULIN (HCC): ICD-10-CM

## 2018-03-06 DIAGNOSIS — R31.9 HEMATURIA, UNSPECIFIED TYPE: Primary | ICD-10-CM

## 2018-03-06 DIAGNOSIS — M77.11 RIGHT TENNIS ELBOW: ICD-10-CM

## 2018-03-06 LAB
BILIRUBIN: NEGATIVE
CREAT UR-SCNC: 119 MG/DL
GLUCOSE (URINE DIPSTICK): NEGATIVE MG/DL
KETONES (URINE DIPSTICK): NEGATIVE MG/DL
MICROALBUMIN UR-MCNC: 8.22 MG/DL
MICROALBUMIN/CREAT 24H UR-RTO: 69.1 UG/MG (ref ?–30)
MULTISTIX LOT#: ABNORMAL NUMERIC
NITRITE, URINE: NEGATIVE
PH, URINE: 6 (ref 4.5–8)
PROTEIN (URINE DIPSTICK): 30 MG/DL
SPECIFIC GRAVITY: 1.02 (ref 1–1.03)
URINE-COLOR: YELLOW
UROBILINOGEN,SEMI-QN: 0.2 MG/DL (ref 0–1.9)

## 2018-03-06 PROCEDURE — 81003 URINALYSIS AUTO W/O SCOPE: CPT | Performed by: FAMILY MEDICINE

## 2018-03-06 PROCEDURE — 99214 OFFICE O/P EST MOD 30 MIN: CPT | Performed by: FAMILY MEDICINE

## 2018-03-06 PROCEDURE — 87086 URINE CULTURE/COLONY COUNT: CPT | Performed by: FAMILY MEDICINE

## 2018-03-06 PROCEDURE — 82570 ASSAY OF URINE CREATININE: CPT | Performed by: FAMILY MEDICINE

## 2018-03-06 PROCEDURE — 82043 UR ALBUMIN QUANTITATIVE: CPT | Performed by: FAMILY MEDICINE

## 2018-03-06 PROCEDURE — 90670 PCV13 VACCINE IM: CPT | Performed by: FAMILY MEDICINE

## 2018-03-06 PROCEDURE — 90471 IMMUNIZATION ADMIN: CPT | Performed by: FAMILY MEDICINE

## 2018-03-06 RX ORDER — METFORMIN HYDROCHLORIDE 500 MG/1
1000 TABLET, EXTENDED RELEASE ORAL DAILY
Qty: 360 TABLET | Refills: 1 | COMMUNITY
Start: 2018-03-06 | End: 2018-08-24 | Stop reason: ALTCHOICE

## 2018-03-06 RX ORDER — CEPHALEXIN 500 MG/1
500 CAPSULE ORAL 3 TIMES DAILY
Qty: 15 CAPSULE | Refills: 0 | Status: SHIPPED | OUTPATIENT
Start: 2018-03-06 | End: 2018-05-24 | Stop reason: ALTCHOICE

## 2018-03-06 NOTE — TELEPHONE ENCOUNTER
Patient called back and she can make the [address] [facility] appointment

## 2018-03-06 NOTE — PROGRESS NOTES
Kasandra Linn    is a 66year old female. Patient presents with: Other: blood in urine inrm . 1       HPI:   Patient complains of dark blood-tinged urine. No fever, chills, nausea. She has had some mild off-and-on right flank pain.   CT scan of the abdo Past Medical History:   Diagnosis Date   • Allergic rhinitis    • Anxiety state    • Depression    • DM type 2 (diabetes mellitus, type 2) ([facility]    • Essential hypertension    • HTN (hypertension)    • Psoriasis    • Visual impairment     glasses     Pas cervical adenopathy  LUNGS: clear to auscultation  CARDIO: RRR without murmur  ABD soft, nontender, normal BS, no masses, rebound or guarding. No organomegaly or CVA tenderness. EXTREMITIES: FEET good DP pulses, good capillary ángela and refill. wart.  She will try over-the-counter Duofilm. Tennis elbow splint given. Explained how to ice the elbow at the end of the day, lift with palms up, avoid repetitive use as much as possible.     Orders Placed This Encounter      Urine Dip, auto without Micr

## 2018-03-06 NOTE — TELEPHONE ENCOUNTER
Patient needs abdominal US in [address] the patient to see if there are mornings that will work better for her since she will need to be fasting - can not leave a message      [employer] scheduling -     Future Appointments  Date Time Prov

## 2018-03-07 NOTE — PROGRESS NOTES
Notify microalbumin is slightly elevated. Need to increase lisinopril to 10 mg daily. That will help protect the kidneys. Recheck microalbumin in 1 year.

## 2018-03-08 ENCOUNTER — HOSPITAL ENCOUNTER (OUTPATIENT)
Dept: ULTRASOUND IMAGING | Age: 63
Discharge: HOME | End: 2018-03-08
Attending: FAMILY MEDICINE
Payer: COMMERCIAL

## 2018-03-08 DIAGNOSIS — R31.9 HEMATURIA, UNSPECIFIED TYPE: ICD-10-CM

## 2018-03-08 PROCEDURE — 76700 US EXAM ABDOM COMPLETE: CPT | Performed by: FAMILY MEDICINE

## 2018-03-08 NOTE — PROGRESS NOTES
Notify the ultrasound shows a fairly large stone in the right kidney and a smaller stone in the left kidney. Neither 1 of them are obstructing the kidneys. They may likely be the source of the blood she saw in her urine.   I would like her to see Dr. LAN

## 2018-03-09 ENCOUNTER — TELEPHONE (OUTPATIENT)
Dept: FAMILY MEDICINE CLINIC | Facility: CLINIC | Age: 63
End: 2018-03-09

## 2018-03-09 DIAGNOSIS — N20.0 KIDNEY STONE: ICD-10-CM

## 2018-03-09 DIAGNOSIS — R31.9 HEMATURIA, UNSPECIFIED TYPE: Primary | ICD-10-CM

## 2018-03-09 NOTE — TELEPHONE ENCOUNTER
ZIA NEEDS A REFERRAL TO DR Elba Burrows AT [address] [place] [address]. PHONE: [phone].

## 2018-03-13 NOTE — TELEPHONE ENCOUNTER
Calling the patient-     She called Dr Juli Boateng office to make an appointment and the  didn't make the appointment because she told the patient that she was not sure that the insurance was accepted, they do not have a referral from us.      AILEEN ley

## 2018-03-13 NOTE — TELEPHONE ENCOUNTER
pt. stating she called Dr. Winnie Castleman and they said there is a issue with her insurance and she is not sure if they accept it? She thought our office was going to call  her back.

## 2018-04-16 ENCOUNTER — TELEPHONE (OUTPATIENT)
Dept: FAMILY MEDICINE CLINIC | Facility: CLINIC | Age: 63
End: 2018-04-16

## 2018-04-16 RX ORDER — LISINOPRIL 10 MG/1
10 TABLET ORAL DAILY
Qty: 90 TABLET | Refills: 3 | Status: SHIPPED | OUTPATIENT
Start: 2018-04-16 | End: 2019-05-13

## 2018-04-16 NOTE — TELEPHONE ENCOUNTER
Calling the patient to advise that we have a few pens that I will hold for her  She is on the phone with Marii to see if she would qualify for the assistance program- she used to be on it before     Her insurance is telling her that her deductible is over

## 2018-04-16 NOTE — TELEPHONE ENCOUNTER
Can I give her samples? I was going to have her check with her insurance to see if Lantus is the preferred insulin? Any suggestions?

## 2018-04-16 NOTE — TELEPHONE ENCOUNTER
lisinopril 10 MG   Send to [address] [place] [address] her Insulin Lantus is $700. Insurance is requiring pt to meet her deductible,  Pt wants to know if there is anything is cheaper.   Pt is going to Call Lantus and see if there is something that can be d

## 2018-04-19 ENCOUNTER — TELEPHONE (OUTPATIENT)
Dept: FAMILY MEDICINE CLINIC | Facility: CLINIC | Age: 63
End: 2018-04-19

## 2018-04-19 NOTE — TELEPHONE ENCOUNTER
None available at this time. Attempted to call patient back. Mailbox cannot accept messages at this time.

## 2018-04-20 NOTE — TELEPHONE ENCOUNTER
Calling the patient to advise that I do have some insulin pens on hold for her.      She is also trying to apply for assistance and I advised that I will print out the application for her ([employer])

## 2018-04-23 ENCOUNTER — TELEPHONE (OUTPATIENT)
Dept: FAMILY MEDICINE CLINIC | Facility: CLINIC | Age: 63
End: 2018-04-23

## 2018-04-23 NOTE — TELEPHONE ENCOUNTER
German the application to [facility] for assistance [facility] [phone]  Fax  [phone]

## 2018-04-30 RX ORDER — PEN NEEDLE, DIABETIC 32GX 5/32"
NEEDLE, DISPOSABLE MISCELLANEOUS
Qty: 50 EACH | Refills: 1 | Status: SHIPPED | OUTPATIENT
Start: 2018-04-30 | End: 2018-06-26 | Stop reason: ALTCHOICE

## 2018-05-02 NOTE — TELEPHONE ENCOUNTER
Received response and the patient does not qualify for the assistance program since the insurance does provide coverage.           Will discuss with Dr Marco Antonio Chow and then I will f/up with the patient

## 2018-05-03 ENCOUNTER — TELEPHONE (OUTPATIENT)
Dept: FAMILY MEDICINE CLINIC | Facility: CLINIC | Age: 63
End: 2018-05-03

## 2018-05-03 NOTE — TELEPHONE ENCOUNTER
The patient does not qualify for the program   Per Dr Faustina Barker we will just give samples because none of the insulins are really affordable     Mailbox does not accept messages

## 2018-05-08 ENCOUNTER — HOSPITAL ENCOUNTER (OUTPATIENT)
Dept: GENERAL RADIOLOGY | Age: 63
Discharge: HOME | End: 2018-05-08
Attending: UROLOGY
Payer: COMMERCIAL

## 2018-05-08 DIAGNOSIS — N20.0 RENAL CALCULUS, RIGHT: ICD-10-CM

## 2018-05-08 PROCEDURE — 74018 RADEX ABDOMEN 1 VIEW: CPT | Performed by: UROLOGY

## 2018-05-18 ENCOUNTER — MED REC SCAN ONLY (OUTPATIENT)
Dept: FAMILY MEDICINE CLINIC | Facility: CLINIC | Age: 63
End: 2018-05-18

## 2018-05-18 ENCOUNTER — TELEPHONE (OUTPATIENT)
Dept: FAMILY MEDICINE CLINIC | Facility: CLINIC | Age: 63
End: 2018-05-18

## 2018-05-18 NOTE — TELEPHONE ENCOUNTER
Pt is coming in on 5/24 for a pre-op for Dr. Ankit Lopez. Needs EKG and labs. Please make sure orders are received from Dr. Ankit Lopez office. Also, pt said she is due in June for her regular sugar check blood work.  Wants to know if she can do these at the same t

## 2018-05-18 NOTE — TELEPHONE ENCOUNTER
We do have the pre-op lab orders   We can do fasting labs at that time       Future Appointments  Date Time Provider Gladis Sutton   5/24/2018 10:00 AM Blair Batres DO [facility] [place]   6/4/2018 2:45 PM Sven Hamilton MD [facility]

## 2018-05-24 ENCOUNTER — LAB ENCOUNTER (OUTPATIENT)
Dept: LAB | Age: 63
End: 2018-05-24
Attending: FAMILY MEDICINE
Payer: COMMERCIAL

## 2018-05-24 ENCOUNTER — OFFICE VISIT (OUTPATIENT)
Dept: FAMILY MEDICINE CLINIC | Facility: CLINIC | Age: 63
End: 2018-05-24

## 2018-05-24 VITALS
DIASTOLIC BLOOD PRESSURE: 72 MMHG | OXYGEN SATURATION: 98 % | WEIGHT: 183.5 LBS | TEMPERATURE: 98 F | HEIGHT: 62 IN | BODY MASS INDEX: 33.77 KG/M2 | SYSTOLIC BLOOD PRESSURE: 120 MMHG | HEART RATE: 67 BPM

## 2018-05-24 DIAGNOSIS — I10 ESSENTIAL HYPERTENSION: ICD-10-CM

## 2018-05-24 DIAGNOSIS — E11.9 TYPE 2 DIABETES MELLITUS WITHOUT COMPLICATION, WITHOUT LONG-TERM CURRENT USE OF INSULIN (HCC): ICD-10-CM

## 2018-05-24 DIAGNOSIS — Z79.899 ENCOUNTER FOR LONG-TERM (CURRENT) DRUG USE: ICD-10-CM

## 2018-05-24 DIAGNOSIS — Z12.39 BREAST CANCER SCREENING: ICD-10-CM

## 2018-05-24 DIAGNOSIS — N20.0 KIDNEY STONE: ICD-10-CM

## 2018-05-24 DIAGNOSIS — Z00.00 PREVENTATIVE HEALTH CARE: ICD-10-CM

## 2018-05-24 DIAGNOSIS — Z01.810 PRE-OPERATIVE CARDIOVASCULAR EXAMINATION: Primary | ICD-10-CM

## 2018-05-24 PROCEDURE — 80053 COMPREHEN METABOLIC PANEL: CPT

## 2018-05-24 PROCEDURE — 93000 ELECTROCARDIOGRAM COMPLETE: CPT | Performed by: FAMILY MEDICINE

## 2018-05-24 PROCEDURE — 80061 LIPID PANEL: CPT

## 2018-05-24 PROCEDURE — 83036 HEMOGLOBIN GLYCOSYLATED A1C: CPT

## 2018-05-24 PROCEDURE — 36415 COLL VENOUS BLD VENIPUNCTURE: CPT | Performed by: FAMILY MEDICINE

## 2018-05-24 PROCEDURE — 99214 OFFICE O/P EST MOD 30 MIN: CPT | Performed by: FAMILY MEDICINE

## 2018-05-24 PROCEDURE — 85025 COMPLETE CBC W/AUTO DIFF WBC: CPT

## 2018-05-24 PROCEDURE — 36415 COLL VENOUS BLD VENIPUNCTURE: CPT

## 2018-05-29 ENCOUNTER — TELEPHONE (OUTPATIENT)
Dept: FAMILY MEDICINE CLINIC | Facility: CLINIC | Age: 63
End: 2018-05-29

## 2018-05-31 ENCOUNTER — TELEPHONE (OUTPATIENT)
Dept: FAMILY MEDICINE CLINIC | Facility: CLINIC | Age: 63
End: 2018-05-31

## 2018-06-01 ENCOUNTER — HOSPITAL ENCOUNTER (OUTPATIENT)
Dept: MAMMOGRAPHY | Age: 63
Discharge: HOME | End: 2018-06-01
Attending: FAMILY MEDICINE
Payer: COMMERCIAL

## 2018-06-01 DIAGNOSIS — Z12.39 BREAST CANCER SCREENING: ICD-10-CM

## 2018-06-01 PROCEDURE — 77067 SCR MAMMO BI INCL CAD: CPT | Performed by: FAMILY MEDICINE

## 2018-06-04 ENCOUNTER — MED REC SCAN ONLY (OUTPATIENT)
Dept: FAMILY MEDICINE CLINIC | Facility: CLINIC | Age: 63
End: 2018-06-04

## 2018-06-11 ENCOUNTER — TELEPHONE (OUTPATIENT)
Dept: FAMILY MEDICINE CLINIC | Facility: CLINIC | Age: 63
End: 2018-06-11

## 2018-06-14 ENCOUNTER — HOSPITAL ENCOUNTER (OUTPATIENT)
Dept: GENERAL RADIOLOGY | Age: 63
Discharge: HOME | End: 2018-06-14
Attending: UROLOGY
Payer: COMMERCIAL

## 2018-06-14 DIAGNOSIS — N20.0 RENAL CALCULUS: ICD-10-CM

## 2018-06-14 PROCEDURE — 74018 RADEX ABDOMEN 1 VIEW: CPT | Performed by: UROLOGY

## 2018-06-15 ENCOUNTER — HOSPITAL ENCOUNTER (OUTPATIENT)
Dept: MAMMOGRAPHY | Age: 63
Discharge: HOME | End: 2018-06-15
Attending: FAMILY MEDICINE
Payer: COMMERCIAL

## 2018-06-15 DIAGNOSIS — R92.1 BREAST CALCIFICATIONS: Primary | ICD-10-CM

## 2018-06-15 DIAGNOSIS — R92.2 INCONCLUSIVE MAMMOGRAM: ICD-10-CM

## 2018-06-15 PROCEDURE — 77065 DX MAMMO INCL CAD UNI: CPT | Performed by: FAMILY MEDICINE

## 2018-06-15 NOTE — IMAGING NOTE
Assisted  with Recommendation for a stereotactic breast biopsy for calcifications. Emotional and educational support provided. Written information provided to Bandar Garcia and she verbalized understanding.

## 2018-06-19 ENCOUNTER — OFFICE VISIT (OUTPATIENT)
Dept: SURGERY | Facility: CLINIC | Age: 63
End: 2018-06-19

## 2018-06-19 VITALS
WEIGHT: 184 LBS | SYSTOLIC BLOOD PRESSURE: 112 MMHG | TEMPERATURE: 98 F | DIASTOLIC BLOOD PRESSURE: 68 MMHG | HEART RATE: 68 BPM | BODY MASS INDEX: 34 KG/M2

## 2018-06-19 DIAGNOSIS — N63.0 BREAST MASS: Primary | ICD-10-CM

## 2018-06-19 DIAGNOSIS — R92.8 ABNORMAL MAMMOGRAM: ICD-10-CM

## 2018-06-19 PROCEDURE — 99244 OFF/OP CNSLTJ NEW/EST MOD 40: CPT | Performed by: SURGERY

## 2018-06-19 NOTE — H&P
Palpable-NONE    MSBE-NO    AGE MENARCHE-13  AGE MENOPAUSE-2007 HYSTERECTOMY   TOTAL-      AGE AT 1ST PREGNANCY-24  BCP-  1         HRT- 0    ETOH-NONE  TOBACCO-NONE  CAFFEINE-NONE  FAMILY HX OF BREAST,UTERINE,OR OVARIAN CA-SISTER WITH PRE CA BREAST C daily. Disp: 180 tablet Rfl: 3   RIZWAN MICROLET LANCETS Does not apply Misc Test 4 times daily Disp: 2 Box Rfl: 11   Fluocinonide 0.05 % External Solution Apply twicxe daily to affected areas - as needed Disp: 1 Bottle Rfl: 0   ALPRAZolam 0.25 MG Oral Tab bruising or excessive bleeding  Endocrine: no weight gain or loss no hot or cold intolerance    EXAM     Blood pressure 112/68, pulse 68, temperature 97.9 °F (36.6 °C), temperature source Temporal, weight 184 lb.   GENERAL: well developed, well nourished fe 186* 68 - 126 mg/dL Final   • Cholesterol, Total 05/24/2018 122  <200 mg/dL Final   • Triglycerides 05/24/2018 79  <150 mg/dL Final   • HDL Cholesterol 05/24/2018 51  >45 mg/dL Final   • LDL Cholesterol 05/24/2018 55  <130 mg/dL Final   • VLDL 05/24/2018 1

## 2018-06-26 ENCOUNTER — TELEPHONE (OUTPATIENT)
Dept: FAMILY MEDICINE CLINIC | Facility: CLINIC | Age: 63
End: 2018-06-26

## 2018-06-26 NOTE — TELEPHONE ENCOUNTER
Calling the patient  The 32x4mm are the ariana needles which are the shortest    She advised that she received needles from eliseo Fong and even though they stated they were the same length they weren't.  They were shorter     Looking at what was sent it lo

## 2018-06-26 NOTE — TELEPHONE ENCOUNTER
Pt states she needs a refill on her pen needles, States the RELION PEN NEEDLES 32G X 4 MM is too long,  The only brand [facility] Car[employer] is Relion and  BD Pen Needles  She would like to try the BD Pen Needles.

## 2018-07-02 ENCOUNTER — OFFICE VISIT (OUTPATIENT)
Dept: FAMILY MEDICINE CLINIC | Facility: CLINIC | Age: 63
End: 2018-07-02

## 2018-07-02 ENCOUNTER — TELEPHONE (OUTPATIENT)
Dept: FAMILY MEDICINE CLINIC | Facility: CLINIC | Age: 63
End: 2018-07-02

## 2018-07-02 VITALS
HEART RATE: 80 BPM | TEMPERATURE: 98 F | WEIGHT: 187 LBS | SYSTOLIC BLOOD PRESSURE: 120 MMHG | HEIGHT: 62 IN | DIASTOLIC BLOOD PRESSURE: 70 MMHG | RESPIRATION RATE: 12 BRPM | BODY MASS INDEX: 34.41 KG/M2

## 2018-07-02 DIAGNOSIS — G56.03 BILATERAL CARPAL TUNNEL SYNDROME: Primary | ICD-10-CM

## 2018-07-02 DIAGNOSIS — G56.03 CARPAL TUNNEL SYNDROME ON BOTH SIDES: Primary | ICD-10-CM

## 2018-07-02 PROCEDURE — 99214 OFFICE O/P EST MOD 30 MIN: CPT | Performed by: FAMILY MEDICINE

## 2018-07-02 NOTE — TELEPHONE ENCOUNTER
Patient needs EMG testing of UE bilateral   Dx: Carpal Tunnel     Early mornings work best   Can not do Friday 7/6/18    Calling neurology-   Future Appointments  Date Time Provider Gladis Sutton   7/6/2018 10:30 AM [facility] [address]

## 2018-07-02 NOTE — PROGRESS NOTES
Reji Broussard is a 66year old female. Patient presents with: Other: hands, left groin, inrm . 5      HPI:   Patient complains of numbness, tingling, pain into both hands that wakes her from sleep at night despite wearing wrist splints at night.   It is type 2) ([facility]    • Essential hypertension    • HTN (hypertension)    • Psoriasis    • Visual impairment     glasses     Past Surgical History:  No date: COLONOSCOPY  No date: HYSTERECTOMY      Comment: lise, bso  No date: RANDOLPH NEEDLE LOCALIZATION W/ SPECIMEN tenderness. EXTREMITIES: Positive Tinel's bilaterally, no thenar or hyperthenar wasting          ASSESSMENT AND PLAN:     Carpal tunnel syndrome on both sides  (primary encounter diagnosis)    We will schedule a NCV/EMG at [facility].   Further management  pen

## 2018-07-06 ENCOUNTER — HOSPITAL ENCOUNTER (OUTPATIENT)
Dept: MAMMOGRAPHY | Facility: HOSPITAL | Age: 63
Discharge: HOME | End: 2018-07-06
Attending: SURGERY
Payer: COMMERCIAL

## 2018-07-06 ENCOUNTER — TELEPHONE (OUTPATIENT)
Dept: FAMILY MEDICINE CLINIC | Facility: CLINIC | Age: 63
End: 2018-07-06

## 2018-07-06 DIAGNOSIS — N63.0 BREAST MASS: ICD-10-CM

## 2018-07-06 PROCEDURE — 88305 TISSUE EXAM BY PATHOLOGIST: CPT | Performed by: SURGERY

## 2018-07-06 PROCEDURE — 19081 BX BREAST 1ST LESION STRTCTC: CPT | Performed by: SURGERY

## 2018-07-06 NOTE — TELEPHONE ENCOUNTER
Pt aware he does give results over the phone but would like to see the pt in office for f/u to check incision.  Pt v/u and made an appt. paul    Future Appointments  Date Time Provider Gladis Sutton   7/17/2018 10:15 AM Pieter Officer, DO [facility]

## 2018-07-16 ENCOUNTER — TELEPHONE (OUTPATIENT)
Dept: FAMILY MEDICINE CLINIC | Facility: CLINIC | Age: 63
End: 2018-07-16

## 2018-07-17 ENCOUNTER — OFFICE VISIT (OUTPATIENT)
Dept: SURGERY | Facility: CLINIC | Age: 63
End: 2018-07-17

## 2018-07-17 VITALS
WEIGHT: 183.81 LBS | HEIGHT: 62 IN | DIASTOLIC BLOOD PRESSURE: 78 MMHG | SYSTOLIC BLOOD PRESSURE: 120 MMHG | BODY MASS INDEX: 33.82 KG/M2 | TEMPERATURE: 97 F

## 2018-07-17 DIAGNOSIS — R92.8 ABNORMAL MAMMOGRAM: Primary | ICD-10-CM

## 2018-07-17 PROCEDURE — 99024 POSTOP FOLLOW-UP VISIT: CPT | Performed by: SURGERY

## 2018-07-17 NOTE — PROGRESS NOTES
[facility]  Progress Note    Kylah Glynn Patient Status:  No patient class for patient encounter    3/6/1955 MRN [id number]   Location [facility], [address] Attending No att. providers found   Hosp Day # 0 PCP Teddy Garcia

## 2018-07-24 ENCOUNTER — PROCEDURE VISIT (OUTPATIENT)
Dept: NEUROLOGY | Facility: CLINIC | Age: 63
End: 2018-07-24

## 2018-07-24 DIAGNOSIS — G56.03 SEVERE CARPAL TUNNEL SYNDROME OF BOTH WRISTS: Primary | ICD-10-CM

## 2018-07-24 PROCEDURE — 95886 MUSC TEST DONE W/N TEST COMP: CPT | Performed by: OTHER

## 2018-07-24 PROCEDURE — 99241 OFFICE CONSULTATION,LEVEL I: CPT | Performed by: OTHER

## 2018-07-24 PROCEDURE — 95910 NRV CNDJ TEST 7-8 STUDIES: CPT | Performed by: OTHER

## 2018-07-24 NOTE — PROGRESS NOTES
[id number] [facility] [place][note 2]  7/24/2018    10:49 AM    RESULTS:  1. Distal motor latencies of both median nerves were markedly delayed  2. Mild ulnar neuropathy elbows bilaterally  3.   EMG of the more

## 2018-07-25 RX ORDER — METFORMIN HYDROCHLORIDE 500 MG/1
TABLET, EXTENDED RELEASE ORAL
Qty: 120 TABLET | Refills: 5 | Status: SHIPPED | OUTPATIENT
Start: 2018-07-25 | End: 2019-02-11

## 2018-08-10 ENCOUNTER — TELEPHONE (OUTPATIENT)
Dept: FAMILY MEDICINE CLINIC | Facility: CLINIC | Age: 63
End: 2018-08-10

## 2018-08-11 NOTE — TELEPHONE ENCOUNTER
Contacted patient, informed she can  sample and we will work on [employer] to find what long acting insulin would be covered.  Pt also states her mother is going to tell her a long acting insulin that was much cheaper and she will try

## 2018-08-13 NOTE — TELEPHONE ENCOUNTER
I called the insurance and spoke with Aleyda-   She advised that the Levemir is covered 20% co-pay  Allegra Vasquez is not  lantus is covered   treciba is also covered       Will call the patient and will also send in a script for the Levemir

## 2018-08-24 ENCOUNTER — OFFICE VISIT (OUTPATIENT)
Dept: FAMILY MEDICINE CLINIC | Facility: CLINIC | Age: 63
End: 2018-08-24

## 2018-08-24 ENCOUNTER — LAB ENCOUNTER (OUTPATIENT)
Dept: LAB | Age: 63
End: 2018-08-24
Attending: FAMILY MEDICINE
Payer: COMMERCIAL

## 2018-08-24 VITALS
SYSTOLIC BLOOD PRESSURE: 130 MMHG | WEIGHT: 183 LBS | HEIGHT: 62 IN | BODY MASS INDEX: 33.68 KG/M2 | TEMPERATURE: 98 F | DIASTOLIC BLOOD PRESSURE: 70 MMHG | OXYGEN SATURATION: 98 % | HEART RATE: 72 BPM

## 2018-08-24 DIAGNOSIS — E11.9 TYPE 2 DIABETES MELLITUS WITHOUT COMPLICATION, WITHOUT LONG-TERM CURRENT USE OF INSULIN (HCC): ICD-10-CM

## 2018-08-24 DIAGNOSIS — I10 ESSENTIAL HYPERTENSION: ICD-10-CM

## 2018-08-24 DIAGNOSIS — G56.03 SEVERE CARPAL TUNNEL SYNDROME OF BOTH WRISTS: ICD-10-CM

## 2018-08-24 DIAGNOSIS — Z11.59 ENCOUNTER FOR HEPATITIS C SCREENING TEST FOR LOW RISK PATIENT: Primary | ICD-10-CM

## 2018-08-24 DIAGNOSIS — E11.9 DIABETES MELLITUS WITHOUT COMPLICATION (HCC): ICD-10-CM

## 2018-08-24 DIAGNOSIS — N20.0 KIDNEY STONE: ICD-10-CM

## 2018-08-24 DIAGNOSIS — Z00.00 PREVENTATIVE HEALTH CARE: Primary | ICD-10-CM

## 2018-08-24 LAB
BILIRUB UR QL STRIP.AUTO: NEGATIVE
COLOR UR AUTO: YELLOW
EST. AVERAGE GLUCOSE BLD GHB EST-MCNC: 186 MG/DL (ref 68–126)
GLUCOSE UR STRIP.AUTO-MCNC: 50 MG/DL
HBA1C MFR BLD HPLC: 8.1 % (ref ?–5.7)
HCV AB SERPL QL IA: NONREACTIVE
KETONES UR STRIP.AUTO-MCNC: NEGATIVE MG/DL
NITRITE UR QL STRIP.AUTO: NEGATIVE
PH UR STRIP.AUTO: 5 [PH] (ref 4.5–8)
PROT UR STRIP.AUTO-MCNC: 30 MG/DL
RBC #/AREA URNS AUTO: >10 /HPF
SP GR UR STRIP.AUTO: 1.04 (ref 1–1.03)
UROBILINOGEN UR STRIP.AUTO-MCNC: <2 MG/DL

## 2018-08-24 PROCEDURE — 99396 PREV VISIT EST AGE 40-64: CPT | Performed by: FAMILY MEDICINE

## 2018-08-24 PROCEDURE — 86803 HEPATITIS C AB TEST: CPT

## 2018-08-24 PROCEDURE — 36415 COLL VENOUS BLD VENIPUNCTURE: CPT

## 2018-08-24 PROCEDURE — 81001 URINALYSIS AUTO W/SCOPE: CPT

## 2018-08-24 PROCEDURE — 83036 HEMOGLOBIN GLYCOSYLATED A1C: CPT

## 2018-08-24 PROCEDURE — 36415 COLL VENOUS BLD VENIPUNCTURE: CPT | Performed by: FAMILY MEDICINE

## 2018-08-24 RX ORDER — CHOLECALCIFEROL (VITAMIN D3) 125 MCG
5 CAPSULE ORAL DAILY
COMMUNITY
End: 2021-03-23

## 2018-08-24 NOTE — PROGRESS NOTES
Kermit Pitts is a 66year old female. Patient presents with: Other: cpx, also discuss kidney stones. ...room 1      HPI:   Patient underwent cystoscopy with stent placement for kidney stones by Dr. Ck Fung. That was in June of this year.   She recentl Past Medical History:   Diagnosis Date   • Allergic rhinitis    • Anxiety state    • Depression    • DM type 2 (diabetes mellitus, type 2) ([facility] 75.)    • Essential hypertension    • HTN (hypertension)    • Psoriasis    • Visual impairment     glasses     Pas nontender, normal BS, no masses, rebound or guarding. No organomegaly or CVA tenderness.   EXTREMITIES: no edema          ASSESSMENT AND PLAN:     Preventative health care  (primary encounter diagnosis)  Type 2 diabetes mellitus without complication, withou

## 2018-10-10 ENCOUNTER — TELEPHONE (OUTPATIENT)
Dept: FAMILY MEDICINE CLINIC | Facility: CLINIC | Age: 63
End: 2018-10-10

## 2018-10-10 NOTE — TELEPHONE ENCOUNTER
insulin detemir (LEVEMIR FLEXTOUCH) 100 UNIT/ML Subcutaneous Solution Pen-injector   PLEASE SEND REFILL TO [facility] IN [place]

## 2018-12-31 ENCOUNTER — TELEPHONE (OUTPATIENT)
Dept: FAMILY MEDICINE CLINIC | Facility: CLINIC | Age: 63
End: 2018-12-31

## 2018-12-31 RX ORDER — METOPROLOL TARTRATE 50 MG/1
TABLET, FILM COATED ORAL
Qty: 180 TABLET | Refills: 3 | Status: CANCELLED | OUTPATIENT
Start: 2018-12-31

## 2018-12-31 RX ORDER — METOPROLOL TARTRATE 50 MG/1
50 TABLET, FILM COATED ORAL 2 TIMES DAILY
Qty: 180 TABLET | Refills: 0 | Status: SHIPPED | OUTPATIENT
Start: 2018-12-31 | End: 2019-04-14

## 2018-12-31 NOTE — TELEPHONE ENCOUNTER
METOPROLOL  TART 50 MG     NEEDS TODAY    [facility] [place]       NEW INS TOMORROW AND THIS WILL NOT BE COVERED SO NEEDS IT TODAY

## 2019-01-10 ENCOUNTER — APPOINTMENT (OUTPATIENT)
Dept: LAB | Age: 64
End: 2019-01-10
Attending: FAMILY MEDICINE
Payer: COMMERCIAL

## 2019-01-10 ENCOUNTER — OFFICE VISIT (OUTPATIENT)
Dept: FAMILY MEDICINE CLINIC | Facility: CLINIC | Age: 64
End: 2019-01-10

## 2019-01-10 VITALS
HEIGHT: 62 IN | HEART RATE: 64 BPM | OXYGEN SATURATION: 99 % | DIASTOLIC BLOOD PRESSURE: 70 MMHG | TEMPERATURE: 97 F | WEIGHT: 179.5 LBS | SYSTOLIC BLOOD PRESSURE: 130 MMHG | BODY MASS INDEX: 33.03 KG/M2

## 2019-01-10 DIAGNOSIS — I10 ESSENTIAL HYPERTENSION: ICD-10-CM

## 2019-01-10 DIAGNOSIS — E11.9 DIABETES MELLITUS WITHOUT COMPLICATION (HCC): ICD-10-CM

## 2019-01-10 DIAGNOSIS — Z00.00 PREVENTATIVE HEALTH CARE: ICD-10-CM

## 2019-01-10 DIAGNOSIS — E11.9 TYPE 2 DIABETES MELLITUS WITHOUT COMPLICATION, WITH LONG-TERM CURRENT USE OF INSULIN (HCC): Primary | ICD-10-CM

## 2019-01-10 DIAGNOSIS — Z79.4 TYPE 2 DIABETES MELLITUS WITHOUT COMPLICATION, WITH LONG-TERM CURRENT USE OF INSULIN (HCC): Primary | ICD-10-CM

## 2019-01-10 DIAGNOSIS — L72.0 EPIDERMAL CYST: ICD-10-CM

## 2019-01-10 LAB
ALBUMIN SERPL-MCNC: 3.8 G/DL (ref 3.1–4.5)
ALBUMIN/GLOB SERPL: 1.1 {RATIO} (ref 1–2)
ALP LIVER SERPL-CCNC: 72 U/L (ref 50–130)
ALT SERPL-CCNC: 64 U/L (ref 14–54)
ANION GAP SERPL CALC-SCNC: 5 MMOL/L (ref 0–18)
AST SERPL-CCNC: 46 U/L (ref 15–41)
BILIRUB SERPL-MCNC: 0.4 MG/DL (ref 0.1–2)
BUN BLD-MCNC: 13 MG/DL (ref 8–20)
BUN/CREAT SERPL: 20.3 (ref 10–20)
CALCIUM BLD-MCNC: 9.2 MG/DL (ref 8.3–10.3)
CHLORIDE SERPL-SCNC: 106 MMOL/L (ref 101–111)
CHOLEST SMN-MCNC: 145 MG/DL (ref ?–200)
CO2 SERPL-SCNC: 27 MMOL/L (ref 22–32)
CREAT BLD-MCNC: 0.64 MG/DL (ref 0.55–1.02)
EST. AVERAGE GLUCOSE BLD GHB EST-MCNC: 209 MG/DL (ref 68–126)
GLOBULIN PLAS-MCNC: 3.6 G/DL (ref 2.8–4.4)
GLUCOSE BLD-MCNC: 190 MG/DL (ref 70–99)
HBA1C MFR BLD HPLC: 8.9 % (ref ?–5.7)
HCV AB SERPL QL IA: NONREACTIVE
HDLC SERPL-MCNC: 52 MG/DL (ref 40–59)
LDLC SERPL CALC-MCNC: 73 MG/DL (ref ?–100)
M PROTEIN MFR SERPL ELPH: 7.4 G/DL (ref 6.4–8.2)
NONHDLC SERPL-MCNC: 93 MG/DL (ref ?–130)
OSMOLALITY SERPL CALC.SUM OF ELEC: 291 MOSM/KG (ref 275–295)
POTASSIUM SERPL-SCNC: 4.5 MMOL/L (ref 3.6–5.1)
SODIUM SERPL-SCNC: 138 MMOL/L (ref 136–144)
TRIGL SERPL-MCNC: 98 MG/DL (ref 30–149)
VLDLC SERPL CALC-MCNC: 20 MG/DL (ref 0–30)

## 2019-01-10 PROCEDURE — 80053 COMPREHEN METABOLIC PANEL: CPT

## 2019-01-10 PROCEDURE — 83036 HEMOGLOBIN GLYCOSYLATED A1C: CPT

## 2019-01-10 PROCEDURE — 99214 OFFICE O/P EST MOD 30 MIN: CPT | Performed by: FAMILY MEDICINE

## 2019-01-10 PROCEDURE — 80061 LIPID PANEL: CPT

## 2019-01-10 PROCEDURE — 36415 COLL VENOUS BLD VENIPUNCTURE: CPT

## 2019-01-10 PROCEDURE — 86803 HEPATITIS C AB TEST: CPT

## 2019-01-10 NOTE — PROGRESS NOTES
Sunday Cavazos is a 66year old female. Patient presents with: Other: med check, diabetes, HTN--also check something on back-has been using neosporin and heat. ... Oneal Claude room 2      HPI:   Patient is due for an A1c today.   She needs a diabetic eye exam.  She al mellitus, type 2) ([facility].)    • Essential hypertension    • HTN (hypertension)    • Psoriasis    • Visual impairment     glasses     Past Surgical History:   Procedure Laterality Date   • BREAST BIOPSY  07/06/2018   • COLONOSCOPY     • HERNIA UMBILICAL REPAIR auscultation  CARDIO: RRR without murmur  ABD soft, nontender, normal BS, no masses, rebound or guarding. No organomegaly or CVA tenderness. EXTREMITIES: no edema  FEET good DP pulses, good capillary ángela and refill.             No deformity, dry skin or

## 2019-01-11 NOTE — TELEPHONE ENCOUNTER
Received a fax that the Humalog is too expensive so the patient is requesting an alternative.      Ok to send a new script for Humalog to see if it is cheaper

## 2019-01-14 ENCOUNTER — TELEPHONE (OUTPATIENT)
Dept: FAMILY MEDICINE CLINIC | Facility: CLINIC | Age: 64
End: 2019-01-14

## 2019-01-14 DIAGNOSIS — E11.9 TYPE 2 DIABETES MELLITUS WITHOUT COMPLICATION, WITHOUT LONG-TERM CURRENT USE OF INSULIN (HCC): ICD-10-CM

## 2019-01-14 NOTE — TELEPHONE ENCOUNTER
Humalog was sent and this morning we received a fax from the pharmacy that the insurance covers Novolog- we sent a new script for the Novolog.

## 2019-01-14 NOTE — TELEPHONE ENCOUNTER
I am messaging Deanna to see if she has any ideas.  Once I hear from her I will f/up with the patient

## 2019-01-14 NOTE — TELEPHONE ENCOUNTER
I am calling the patient to explain the insurance only covers Novolog. We tried Humalog and the insurance will not cover  She advised that it is $113/mo for the Novolog and she can not afford this.      I will see what  recommends and get back to her

## 2019-01-14 NOTE — TELEPHONE ENCOUNTER
Patient called to check status of her request for the insulin to be changed to a less expensive medication

## 2019-01-15 NOTE — TELEPHONE ENCOUNTER
Calling the patient to let her know that I am waiting to hear back from [facility].    No answer

## 2019-01-16 NOTE — TELEPHONE ENCOUNTER
Calling the patient again to update- I will reach out to her once we get a response from [facility]

## 2019-01-23 ENCOUNTER — TELEPHONE (OUTPATIENT)
Dept: ENDOCRINOLOGY CLINIC | Facility: CLINIC | Age: 64
End: 2019-01-23

## 2019-01-23 DIAGNOSIS — E11.9 DIABETES MELLITUS WITHOUT COMPLICATION (HCC): Primary | ICD-10-CM

## 2019-01-24 DIAGNOSIS — E11.9 TYPE 2 DIABETES MELLITUS WITHOUT COMPLICATION, WITH LONG-TERM CURRENT USE OF INSULIN (HCC): Primary | ICD-10-CM

## 2019-01-24 DIAGNOSIS — Z79.4 TYPE 2 DIABETES MELLITUS WITHOUT COMPLICATION, WITH LONG-TERM CURRENT USE OF INSULIN (HCC): Primary | ICD-10-CM

## 2019-01-24 NOTE — TELEPHONE ENCOUNTER
I contacted the pt. To find out what her insurance coverage is for the new calendar year. She explained that she hasn't met her deductible of $500 yet. Explained that discount cards will help some but until her deductible is met, may still  be expensive.  I

## 2019-02-11 DIAGNOSIS — E11.9 TYPE 2 DIABETES MELLITUS WITHOUT COMPLICATION, WITH LONG-TERM CURRENT USE OF INSULIN (HCC): Primary | ICD-10-CM

## 2019-02-11 DIAGNOSIS — Z79.4 TYPE 2 DIABETES MELLITUS WITHOUT COMPLICATION, WITH LONG-TERM CURRENT USE OF INSULIN (HCC): Primary | ICD-10-CM

## 2019-02-11 RX ORDER — METFORMIN HYDROCHLORIDE 500 MG/1
TABLET, EXTENDED RELEASE ORAL
Qty: 360 TABLET | Refills: 1 | Status: SHIPPED | OUTPATIENT
Start: 2019-02-11 | End: 2019-06-19

## 2019-02-18 ENCOUNTER — TELEPHONE (OUTPATIENT)
Dept: FAMILY MEDICINE CLINIC | Facility: CLINIC | Age: 64
End: 2019-02-18

## 2019-02-19 ENCOUNTER — OFFICE VISIT (OUTPATIENT)
Dept: SURGERY | Facility: CLINIC | Age: 64
End: 2019-02-19

## 2019-02-19 VITALS — HEART RATE: 67 BPM | TEMPERATURE: 98 F | WEIGHT: 179 LBS | BODY MASS INDEX: 32.94 KG/M2 | HEIGHT: 62 IN

## 2019-02-19 DIAGNOSIS — L98.9 SKIN LESION: Primary | ICD-10-CM

## 2019-02-19 PROCEDURE — 11401 EXC TR-EXT B9+MARG 0.6-1 CM: CPT | Performed by: SURGERY

## 2019-02-19 PROCEDURE — 99243 OFF/OP CNSLTJ NEW/EST LOW 30: CPT | Performed by: SURGERY

## 2019-02-19 PROCEDURE — 88305 TISSUE EXAM BY PATHOLOGIST: CPT | Performed by: SURGERY

## 2019-02-19 NOTE — H&P
Aparna Jacobsen is a 66year old female  Patient presents with:  Cyst: Est patient referred by Dr. Sosa Overcast for right buttock cyst. c/o right buttock pain with laying down. Denies any drainage.        REFERRED BY    Patient presents with skin lesion over th 1 pen into the skin daily. Disp: 1 Box Rfl: prn   lisinopril 10 MG Oral Tab Take 1 tablet (10 mg total) by mouth daily.  Disp: 90 tablet Rfl: 3   Glucose Blood In Vitro Strip Test 4 times daily Disp: 390 strip Rfl: 3   RIZWAN MICROLET LANCETS Does not apply constipation, diarrhea; no rectal bleeding; no heartburn  GENITAL/: no dysuria, urgency or frequency, no tea colored urine  MUSCULOSKELETAL: no joint complaints upper or lower extremities  HEMATOLOGY: denies hx anemia; denies bruising or excessive bleedi <200 mg/dL  Borderline  200-239 mg/dL  High      >=240 mg/dL       • HDL Cholesterol 01/10/2019 52  40 - 59 mg/dL Final      Interpretive Information:   An HDL cholesterol <40 mg/dL is low and constitutes a coronary heart disease risk factor.  An HDL choles g/dL Final   • Globulin  01/10/2019 3.6  2.8 - 4.4 g/dL Final   • A/G Ratio 01/10/2019 1.1  1.0 - 2.0 Final   • Hepatitis C Virus 01/10/2019 Nonreactive   Nonreactive  Final       ASSESSMENT   Imp: Nonhealing skin lesion of the gluteal skin  PLan: Excision

## 2019-02-25 NOTE — PROGRESS NOTES
Pt informed of results.    Future Appointments  3/4/2019   10:30 AM   Ashok Lopez, RN, CDE [facility]   [facility] DI[facility]  3/5/2019   10:45 AM   Smith Clement DO         EM[facility]       [facility] [place]  5/13/2019  8:15 AM    Jus Rizo MD        [facility]

## 2019-03-04 ENCOUNTER — NURSE ONLY (OUTPATIENT)
Dept: ENDOCRINOLOGY CLINIC | Facility: CLINIC | Age: 64
End: 2019-03-04

## 2019-03-04 VITALS — BODY MASS INDEX: 33 KG/M2 | WEIGHT: 178 LBS | SYSTOLIC BLOOD PRESSURE: 114 MMHG | DIASTOLIC BLOOD PRESSURE: 60 MMHG

## 2019-03-04 DIAGNOSIS — E11.9 DIABETES MELLITUS WITHOUT COMPLICATION (HCC): ICD-10-CM

## 2019-03-04 PROCEDURE — G0108 DIAB MANAGE TRN  PER INDIV: HCPCS | Performed by: DIETITIAN, REGISTERED

## 2019-03-05 ENCOUNTER — OFFICE VISIT (OUTPATIENT)
Dept: SURGERY | Facility: CLINIC | Age: 64
End: 2019-03-05

## 2019-03-05 VITALS — WEIGHT: 178 LBS | HEART RATE: 61 BPM | HEIGHT: 62 IN | BODY MASS INDEX: 32.76 KG/M2 | TEMPERATURE: 98 F

## 2019-03-05 DIAGNOSIS — L98.9 SKIN LESION: Primary | ICD-10-CM

## 2019-03-05 PROCEDURE — 99024 POSTOP FOLLOW-UP VISIT: CPT | Performed by: SURGERY

## 2019-03-11 NOTE — PROGRESS NOTES
Dale Apolinar  : [id number] was seen for Diabetic Education Initial/Follow up:    Date: 3/4/2019  Referring MD: Dr. Arlene Downey Start time: 10:30am End time: 11:30am    Assessment:     Assessment: /60 (BP Location: Left arm, Patient Position: Sitt Specimen:    Buttocks, right                                                                            Final Diagnosis:       Skin, right buttocks, excision:  -Ulcer with suppurative inflammation, reactive epidermal changes and underlying scar.  -Negati

## 2019-04-15 ENCOUNTER — TELEPHONE (OUTPATIENT)
Dept: ENDOCRINOLOGY CLINIC | Facility: CLINIC | Age: 64
End: 2019-04-15

## 2019-04-15 ENCOUNTER — NURSE ONLY (OUTPATIENT)
Dept: ENDOCRINOLOGY CLINIC | Facility: CLINIC | Age: 64
End: 2019-04-15

## 2019-04-15 DIAGNOSIS — E11.9 DIABETES MELLITUS WITHOUT COMPLICATION (HCC): Primary | ICD-10-CM

## 2019-04-15 PROCEDURE — G0108 DIAB MANAGE TRN  PER INDIV: HCPCS | Performed by: DIETITIAN, REGISTERED

## 2019-04-15 RX ORDER — METOPROLOL TARTRATE 50 MG/1
TABLET, FILM COATED ORAL
Qty: 180 TABLET | Refills: 0 | Status: SHIPPED | OUTPATIENT
Start: 2019-04-15 | End: 2019-07-23

## 2019-04-18 ENCOUNTER — OFFICE VISIT (OUTPATIENT)
Dept: FAMILY MEDICINE CLINIC | Facility: CLINIC | Age: 64
End: 2019-04-18

## 2019-04-18 VITALS
HEIGHT: 62 IN | DIASTOLIC BLOOD PRESSURE: 72 MMHG | HEART RATE: 65 BPM | WEIGHT: 175 LBS | SYSTOLIC BLOOD PRESSURE: 120 MMHG | TEMPERATURE: 98 F | BODY MASS INDEX: 32.2 KG/M2 | OXYGEN SATURATION: 99 % | RESPIRATION RATE: 16 BRPM

## 2019-04-18 DIAGNOSIS — N20.0 KIDNEY STONE: ICD-10-CM

## 2019-04-18 DIAGNOSIS — Z79.4 TYPE 2 DIABETES MELLITUS WITHOUT COMPLICATION, WITH LONG-TERM CURRENT USE OF INSULIN (HCC): ICD-10-CM

## 2019-04-18 DIAGNOSIS — I10 ESSENTIAL HYPERTENSION: ICD-10-CM

## 2019-04-18 DIAGNOSIS — E11.9 TYPE 2 DIABETES MELLITUS WITHOUT COMPLICATION, WITH LONG-TERM CURRENT USE OF INSULIN (HCC): ICD-10-CM

## 2019-04-18 DIAGNOSIS — Z00.00 PREVENTATIVE HEALTH CARE: Primary | ICD-10-CM

## 2019-04-18 PROCEDURE — 99396 PREV VISIT EST AGE 40-64: CPT | Performed by: FAMILY MEDICINE

## 2019-04-22 VITALS — WEIGHT: 179 LBS | BODY MASS INDEX: 33 KG/M2

## 2019-04-22 NOTE — PROGRESS NOTES
Remington Ramirez  : [id number] was seen for Injection Instruction:    Date: 4/15/2019 Start time: 11:30am End time: 12:30pm    Wt 179 lb   BMI 32.74 kg/m²     Glucose today: 215 mg/dL  Medication type, dose and frequency: Trulicity 1.50 mg weekly    Instruc

## 2019-05-09 ENCOUNTER — TELEPHONE (OUTPATIENT)
Dept: FAMILY MEDICINE CLINIC | Facility: CLINIC | Age: 64
End: 2019-05-09

## 2019-05-09 NOTE — TELEPHONE ENCOUNTER
Patient is scheduled for colonoscopy on Monday and she needs a referral in hand for the procedure. The status shows pending status.  Please advise

## 2019-05-10 NOTE — TELEPHONE ENCOUNTER
I spoke with Marcelo Poole at Dr Ariana Dang office this morning and she has already addressed with the insurance and she spoke with the patient this morning

## 2019-05-13 PROBLEM — K57.30 DIVERTICULOSIS OF LARGE INTESTINE WITHOUT PERFORATION OR ABSCESS WITHOUT BLEEDING: Status: ACTIVE | Noted: 2019-05-13

## 2019-05-13 PROBLEM — D12.7 BENIGN NEOPLASM OF RECTOSIGMOID JUNCTION: Status: ACTIVE | Noted: 2019-05-13

## 2019-05-13 PROBLEM — K64.8 OTHER HEMORRHOIDS: Status: ACTIVE | Noted: 2019-05-13

## 2019-05-13 PROBLEM — Z86.0100 PERSONAL HISTORY OF COLONIC POLYPS: Status: ACTIVE | Noted: 2019-05-13

## 2019-05-13 PROBLEM — Z86.010 PERSONAL HISTORY OF COLONIC POLYPS: Status: ACTIVE | Noted: 2019-05-13

## 2019-05-13 PROBLEM — K63.5 POLYP OF COLON: Status: ACTIVE | Noted: 2019-05-13

## 2019-05-13 PROCEDURE — 88305 TISSUE EXAM BY PATHOLOGIST: CPT | Performed by: INTERNAL MEDICINE

## 2019-05-13 RX ORDER — LISINOPRIL 10 MG/1
TABLET ORAL
Qty: 90 TABLET | Refills: 0 | Status: SHIPPED | OUTPATIENT
Start: 2019-05-13 | End: 2019-09-23

## 2019-05-25 ENCOUNTER — TELEPHONE (OUTPATIENT)
Dept: FAMILY MEDICINE CLINIC | Facility: CLINIC | Age: 64
End: 2019-05-25

## 2019-05-25 DIAGNOSIS — L72.0 EPITHELIAL INCLUSION CYST: Primary | ICD-10-CM

## 2019-05-25 NOTE — TELEPHONE ENCOUNTER
Previous referral was only good for 1 visit. Ok to place a new referral for her to see Dr Stephen Trejo?

## 2019-05-25 NOTE — TELEPHONE ENCOUNTER
ZIA SAYS THE SKIN TAG/IRRITATION THAT DR Betsy Shabazz REMOVED ON 3/5 HAS NOT GONE AWAY AND IT IS PAINFUL. DOES SHE NEED A REFERRAL TO SEE DR DAVIS AGAIN?  PLEASE CALL

## 2019-05-25 NOTE — TELEPHONE ENCOUNTER
I called the patient and I advised that she needs to f/up with Dr Wayne Andrew.  I will place a referral.     I looked at his schedule and he is here next Tuesday    Future Appointments   Date Time Provider Gladis Sutton   5/28/2019  4:45 PM Van English,

## 2019-05-30 ENCOUNTER — PATIENT OUTREACH (OUTPATIENT)
Dept: FAMILY MEDICINE CLINIC | Facility: CLINIC | Age: 64
End: 2019-05-30

## 2019-05-30 NOTE — PROGRESS NOTES
Left message for patient to return phone call. Upon return phone call will notify patient that Dr. Helena Gonzalez has reviewed her chart and is requesting she have a Hemoglobin A1C drawn this week.

## 2019-06-03 NOTE — PROGRESS NOTES
Future Appointments   Date Time Provider Gladis Sutton   6/7/2019  4:45 PM Inez Cuevas DO [facility]     I will also send a [id number] message.

## 2019-06-07 ENCOUNTER — OFFICE VISIT (OUTPATIENT)
Dept: SURGERY | Facility: CLINIC | Age: 64
End: 2019-06-07

## 2019-06-07 VITALS — SYSTOLIC BLOOD PRESSURE: 130 MMHG | HEART RATE: 86 BPM | OXYGEN SATURATION: 96 % | DIASTOLIC BLOOD PRESSURE: 70 MMHG

## 2019-06-07 DIAGNOSIS — L98.9 SKIN LESION: Primary | ICD-10-CM

## 2019-06-07 PROCEDURE — 99213 OFFICE O/P EST LOW 20 MIN: CPT | Performed by: SURGERY

## 2019-06-07 NOTE — PROGRESS NOTES
[facility]  Progress Note    Alden Cramer Patient Status:  No patient class for patient encounter    3/6/1955 MRN [id number]   Location ED [facility], Angélica Awad Attending No att. providers found   Hosp Day # 0 PCP Jimmy Mancuso patient should return to see me. Pathology is benign this is not cancer  Patient does have a history of psoriasis and may need to see a dermatologist if this persists. .  I spent  23  minutes face to face with the patient.  More than 50% of that time was sp

## 2019-06-19 ENCOUNTER — APPOINTMENT (OUTPATIENT)
Dept: LAB | Age: 64
End: 2019-06-19
Attending: FAMILY MEDICINE
Payer: COMMERCIAL

## 2019-06-19 ENCOUNTER — OFFICE VISIT (OUTPATIENT)
Dept: FAMILY MEDICINE CLINIC | Facility: CLINIC | Age: 64
End: 2019-06-19

## 2019-06-19 VITALS
DIASTOLIC BLOOD PRESSURE: 72 MMHG | HEART RATE: 58 BPM | BODY MASS INDEX: 33.03 KG/M2 | WEIGHT: 179.5 LBS | TEMPERATURE: 98 F | SYSTOLIC BLOOD PRESSURE: 122 MMHG | HEIGHT: 62 IN | OXYGEN SATURATION: 99 %

## 2019-06-19 DIAGNOSIS — E11.9 TYPE 2 DIABETES MELLITUS WITHOUT COMPLICATION, WITH LONG-TERM CURRENT USE OF INSULIN (HCC): Primary | ICD-10-CM

## 2019-06-19 DIAGNOSIS — I10 ESSENTIAL HYPERTENSION: ICD-10-CM

## 2019-06-19 DIAGNOSIS — Z79.4 TYPE 2 DIABETES MELLITUS WITHOUT COMPLICATION, WITH LONG-TERM CURRENT USE OF INSULIN (HCC): ICD-10-CM

## 2019-06-19 DIAGNOSIS — E11.9 TYPE 2 DIABETES MELLITUS WITHOUT COMPLICATION, WITHOUT LONG-TERM CURRENT USE OF INSULIN (HCC): ICD-10-CM

## 2019-06-19 DIAGNOSIS — E11.9 TYPE 2 DIABETES MELLITUS WITHOUT COMPLICATION, WITH LONG-TERM CURRENT USE OF INSULIN (HCC): ICD-10-CM

## 2019-06-19 DIAGNOSIS — Z79.4 TYPE 2 DIABETES MELLITUS WITHOUT COMPLICATION, WITH LONG-TERM CURRENT USE OF INSULIN (HCC): Primary | ICD-10-CM

## 2019-06-19 DIAGNOSIS — Z12.39 BREAST CANCER SCREENING: ICD-10-CM

## 2019-06-19 PROCEDURE — 82043 UR ALBUMIN QUANTITATIVE: CPT

## 2019-06-19 PROCEDURE — 83036 HEMOGLOBIN GLYCOSYLATED A1C: CPT

## 2019-06-19 PROCEDURE — 82570 ASSAY OF URINE CREATININE: CPT

## 2019-06-19 PROCEDURE — 36415 COLL VENOUS BLD VENIPUNCTURE: CPT

## 2019-06-19 PROCEDURE — 80061 LIPID PANEL: CPT

## 2019-06-19 PROCEDURE — 80053 COMPREHEN METABOLIC PANEL: CPT

## 2019-06-19 PROCEDURE — 99214 OFFICE O/P EST MOD 30 MIN: CPT | Performed by: FAMILY MEDICINE

## 2019-06-19 RX ORDER — METFORMIN HYDROCHLORIDE 500 MG/1
TABLET, EXTENDED RELEASE ORAL
Qty: 360 TABLET | Refills: 1 | COMMUNITY
Start: 2019-06-19 | End: 2019-12-17

## 2019-06-19 NOTE — PROGRESS NOTES
Jason Fitzgerald is a 60year old female. Patient presents with:  Diabetes: needs micro and A5V--jwl cut metformin and still having a lot of diarrhea daily. ...room 1      HPI:   Patient continues to be noncompliant with medication.   She never started the Tr strip Rfl: 3   RIZWAN MICROLET LANCETS Does not apply Misc Test 4 times daily Disp: 2 Box Rfl: 11     No current facility-administered medications on file prior to visit.       Past Medical History:   Diagnosis Date   • Abdominal pain    • Allergic rhinitis use: No       REVIEW OF SYSTEMS:   GENERAL HEALTH: feels well otherwise  SKIN: denies any unusual skin lesions or rashes  RESPIRATORY: denies shortness of breath   CARDIOVASCULAR: denies chest pain   GI: denies nausea, vomiting, diarrhea or abdominal pain

## 2019-06-21 DIAGNOSIS — Z79.4 TYPE 2 DIABETES MELLITUS WITHOUT COMPLICATION, WITH LONG-TERM CURRENT USE OF INSULIN (HCC): Primary | ICD-10-CM

## 2019-06-21 DIAGNOSIS — E11.9 TYPE 2 DIABETES MELLITUS WITHOUT COMPLICATION, WITH LONG-TERM CURRENT USE OF INSULIN (HCC): Primary | ICD-10-CM

## 2019-06-21 DIAGNOSIS — I10 ESSENTIAL HYPERTENSION: ICD-10-CM

## 2019-07-24 RX ORDER — METOPROLOL TARTRATE 50 MG/1
TABLET, FILM COATED ORAL
Qty: 180 TABLET | Refills: 0 | Status: SHIPPED | OUTPATIENT
Start: 2019-07-24 | End: 2019-10-28

## 2019-07-24 NOTE — TELEPHONE ENCOUNTER
Metoprolol refill request.     Last office visit: 6/19/2019, /72  Last refill: 4/15/2019, #180  Labs due: 9/21/2019    No future appointments.

## 2019-08-02 RX ORDER — PEN NEEDLE, DIABETIC 32GX 5/32"
NEEDLE, DISPOSABLE MISCELLANEOUS
Qty: 1 BOX | Status: SHIPPED | OUTPATIENT
Start: 2019-08-02 | End: 2021-03-23

## 2019-08-20 ENCOUNTER — OFFICE VISIT (OUTPATIENT)
Dept: FAMILY MEDICINE CLINIC | Facility: CLINIC | Age: 64
End: 2019-08-20

## 2019-08-20 VITALS
HEART RATE: 70 BPM | SYSTOLIC BLOOD PRESSURE: 124 MMHG | BODY MASS INDEX: 32 KG/M2 | WEIGHT: 176.5 LBS | TEMPERATURE: 98 F | OXYGEN SATURATION: 98 % | DIASTOLIC BLOOD PRESSURE: 80 MMHG

## 2019-08-20 DIAGNOSIS — M17.12 PRIMARY OSTEOARTHRITIS OF LEFT KNEE: Primary | ICD-10-CM

## 2019-08-20 DIAGNOSIS — E11.9 TYPE 2 DIABETES MELLITUS WITHOUT COMPLICATION, WITHOUT LONG-TERM CURRENT USE OF INSULIN (HCC): ICD-10-CM

## 2019-08-20 DIAGNOSIS — L40.9 PSORIASIS OF SCALP: ICD-10-CM

## 2019-08-20 PROCEDURE — 99214 OFFICE O/P EST MOD 30 MIN: CPT | Performed by: FAMILY MEDICINE

## 2019-08-20 RX ORDER — FLUOCINONIDE 0.5 MG/ML
SOLUTION TOPICAL
Qty: 1 BOTTLE | Refills: 3 | Status: SHIPPED | OUTPATIENT
Start: 2019-08-20 | End: 2020-06-03 | Stop reason: ALTCHOICE

## 2019-08-20 NOTE — PROGRESS NOTES
Boris Lindsay is a 60year old female. Patient presents with:  Knee Pain: lt knee pain-statred approx June 2019-has not done anything for it. ..room 2      HPI:   C/o medial left knee pain, NO KNOWN INJURY, no swelling , buckling or locking.  Worse with go • Diarrhea, unspecified    • DM type 2 (diabetes mellitus, type 2) (HCC)    • Essential hypertension    • Hemorrhoids    • Leaking of urine    • Nausea    • Pain in joints    • Psoriasis    • Sleep apnea    • Stress    • Visual impairment     glasses   • Wt 176 lb 8 oz   SpO2 98%   BMI 32.28 kg/m²   GENERAL: well developed, well nourished,in no apparent distress  SKIN: no rashes,no suspicious lesions  HEENT: atraumatic, normocephalic, R TM normal, L TM normal, Pharynx normal  NECK: supple, no cervical ad Consults:  None

## 2019-09-10 DIAGNOSIS — E11.9 DIABETES MELLITUS WITHOUT COMPLICATION (HCC): ICD-10-CM

## 2019-09-10 RX ORDER — INSULIN GLARGINE 100 [IU]/ML
INJECTION, SOLUTION SUBCUTANEOUS
Qty: 15 PEN | Refills: 3 | Status: SHIPPED | OUTPATIENT
Start: 2019-09-10 | End: 2019-09-13

## 2019-09-13 ENCOUNTER — TELEPHONE (OUTPATIENT)
Dept: FAMILY MEDICINE CLINIC | Facility: CLINIC | Age: 64
End: 2019-09-13

## 2019-09-13 NOTE — TELEPHONE ENCOUNTER
I called the patient and left message that we have samples of Toujeo. I will hold in Rn Sam on [place] side for her     I will also send a script for the [address]. I recommended that she call for pricing prior to picking up.  If the script is too expensive we

## 2019-09-13 NOTE — TELEPHONE ENCOUNTER
Patient went to get her insulin at [address] they told her its' not covered with insurance. is there someething that is

## 2019-09-13 NOTE — TELEPHONE ENCOUNTER
I CALLED THE PATIENT TO CONFIRM WHICH MEDICATION WAS NOT COVERED? Per the patient it is her Basaglar. She tok her last 22 units last night and she normally takes 50 units.  She doesn't know what is covered and the pharmacy didn't know what was either

## 2019-09-13 NOTE — TELEPHONE ENCOUNTER
Samples of Lantus, Basaglar or Toujeo would be fine for now. I have A no idea what her insurance covers. Kate Stuart

## 2019-09-23 RX ORDER — LISINOPRIL 10 MG/1
TABLET ORAL
Qty: 90 TABLET | Refills: 0 | Status: SHIPPED | OUTPATIENT
Start: 2019-09-23 | End: 2019-12-17

## 2019-09-30 ENCOUNTER — OFFICE VISIT (OUTPATIENT)
Dept: FAMILY MEDICINE CLINIC | Facility: CLINIC | Age: 64
End: 2019-09-30

## 2019-09-30 ENCOUNTER — APPOINTMENT (OUTPATIENT)
Dept: LAB | Age: 64
End: 2019-09-30
Attending: FAMILY MEDICINE
Payer: COMMERCIAL

## 2019-09-30 ENCOUNTER — TELEPHONE (OUTPATIENT)
Dept: FAMILY MEDICINE CLINIC | Facility: CLINIC | Age: 64
End: 2019-09-30

## 2019-09-30 VITALS
HEART RATE: 60 BPM | DIASTOLIC BLOOD PRESSURE: 72 MMHG | OXYGEN SATURATION: 98 % | TEMPERATURE: 97 F | SYSTOLIC BLOOD PRESSURE: 120 MMHG | BODY MASS INDEX: 32 KG/M2 | WEIGHT: 175.38 LBS

## 2019-09-30 DIAGNOSIS — E11.9 TYPE 2 DIABETES MELLITUS WITHOUT COMPLICATION, WITH LONG-TERM CURRENT USE OF INSULIN (HCC): ICD-10-CM

## 2019-09-30 DIAGNOSIS — Z79.4 TYPE 2 DIABETES MELLITUS WITHOUT COMPLICATION, WITH LONG-TERM CURRENT USE OF INSULIN (HCC): ICD-10-CM

## 2019-09-30 DIAGNOSIS — K58.0 IRRITABLE BOWEL SYNDROME WITH DIARRHEA: Primary | ICD-10-CM

## 2019-09-30 PROCEDURE — 36415 COLL VENOUS BLD VENIPUNCTURE: CPT

## 2019-09-30 PROCEDURE — 99214 OFFICE O/P EST MOD 30 MIN: CPT | Performed by: FAMILY MEDICINE

## 2019-09-30 PROCEDURE — 83036 HEMOGLOBIN GLYCOSYLATED A1C: CPT

## 2019-09-30 RX ORDER — DICYCLOMINE HCL 20 MG
20 TABLET ORAL
Qty: 90 TABLET | Refills: 3 | Status: SHIPPED | OUTPATIENT
Start: 2019-09-30 | End: 2019-12-17 | Stop reason: ALTCHOICE

## 2019-09-30 NOTE — PROGRESS NOTES
Kasandra Doe is a 60year old female.   Patient presents with:  Diarrhea: has been having lower abdominal pain, into back with diarrhea 4-5 times a day, every day for 1 week-has had this pain before but has never lasted this long--doesnt matter what pt ea Box Rfl: 11     No current facility-administered medications on file prior to visit.       Past Medical History:   Diagnosis Date   • Abdominal pain    • Allergic rhinitis    • Anxiety state    • Back pain    • Blood in urine    • Calculus of kidney    • [place] any unusual skin lesions or rashes  RESPIRATORY: denies shortness of breath   CARDIOVASCULAR: denies chest pain   NEURO: denies headaches    EXAM:   /72   Pulse 60   Temp 97 °F (36.1 °C) (Tympanic)   Wt 175 lb 6 oz   SpO2 98%   BMI 32.08 kg/m²   GENE

## 2019-09-30 NOTE — TELEPHONE ENCOUNTER
Pharmacy has tried 5x to get PA approved for the Nedra Pro and have been unsuccessful. They are asking that we send in a script for Lantus. Ok to change? I had samples of Toujeo for the patient but she never came in for them.

## 2019-10-01 ENCOUNTER — TELEPHONE (OUTPATIENT)
Dept: FAMILY MEDICINE CLINIC | Facility: CLINIC | Age: 64
End: 2019-10-01

## 2019-10-04 ENCOUNTER — TELEPHONE (OUTPATIENT)
Dept: FAMILY MEDICINE CLINIC | Facility: CLINIC | Age: 64
End: 2019-10-04

## 2019-10-07 ENCOUNTER — HOSPITAL ENCOUNTER (OUTPATIENT)
Dept: MAMMOGRAPHY | Age: 64
Discharge: HOME | End: 2019-10-07
Attending: FAMILY MEDICINE
Payer: COMMERCIAL

## 2019-10-07 DIAGNOSIS — Z12.39 BREAST CANCER SCREENING: ICD-10-CM

## 2019-10-07 PROCEDURE — 77067 SCR MAMMO BI INCL CAD: CPT | Performed by: FAMILY MEDICINE

## 2019-10-09 NOTE — TELEPHONE ENCOUNTER
Diarrhea 3-4 times/day, very loose. Feels ok otherwise. Denies nausea and vomiting. Has been taking dicyclomine without benefit. Able to sleep through the nigh, had 1 night early on when she was up x2 during the night. Any other suggestions?  Need to call h

## 2019-10-09 NOTE — TELEPHONE ENCOUNTER
Patient advised on voicemail per her request. Stool specimen \"kit\" left at the front for her to P/U

## 2019-10-10 NOTE — TELEPHONE ENCOUNTER
Pt called back.  She is aware of 's recommendations and will  the stool kit that is up front for her. paul

## 2019-10-12 ENCOUNTER — NURSE ONLY (OUTPATIENT)
Dept: FAMILY MEDICINE CLINIC | Facility: CLINIC | Age: 64
End: 2019-10-12

## 2019-10-12 DIAGNOSIS — K58.0 IRRITABLE BOWEL SYNDROME WITH DIARRHEA: Primary | ICD-10-CM

## 2019-10-12 PROCEDURE — 87329 GIARDIA AG IA: CPT | Performed by: FAMILY MEDICINE

## 2019-10-12 PROCEDURE — 89055 LEUKOCYTE ASSESSMENT FECAL: CPT | Performed by: FAMILY MEDICINE

## 2019-10-12 PROCEDURE — 87272 CRYPTOSPORIDIUM AG IF: CPT | Performed by: FAMILY MEDICINE

## 2019-10-12 PROCEDURE — 87209 SMEAR COMPLEX STAIN: CPT | Performed by: FAMILY MEDICINE

## 2019-10-12 PROCEDURE — 87427 SHIGA-LIKE TOXIN AG IA: CPT | Performed by: FAMILY MEDICINE

## 2019-10-12 PROCEDURE — 87045 FECES CULTURE AEROBIC BACT: CPT | Performed by: FAMILY MEDICINE

## 2019-10-12 PROCEDURE — 82272 OCCULT BLD FECES 1-3 TESTS: CPT | Performed by: FAMILY MEDICINE

## 2019-10-12 PROCEDURE — 87493 C DIFF AMPLIFIED PROBE: CPT | Performed by: FAMILY MEDICINE

## 2019-10-12 PROCEDURE — 87046 STOOL CULTR AEROBIC BACT EA: CPT | Performed by: FAMILY MEDICINE

## 2019-10-12 PROCEDURE — 87177 OVA AND PARASITES SMEARS: CPT | Performed by: FAMILY MEDICINE

## 2019-10-13 LAB
C DIFF TOX B STL QL: NEGATIVE
CRYPTOSP AG STL QL IA: NEGATIVE
G LAMBLIA AG STL QL IA: NEGATIVE

## 2019-10-15 ENCOUNTER — TELEPHONE (OUTPATIENT)
Dept: FAMILY MEDICINE CLINIC | Facility: CLINIC | Age: 64
End: 2019-10-15

## 2019-10-15 NOTE — TELEPHONE ENCOUNTER
The patient called back and she advised that she sees that her stool results are all negative. I advised that the ova & parasites are still pending. Her symptoms may be slightly improving? Some days are still bad up to 5x/day.      She stopped the Dicyc Color consistent with ethnicity/race, warm, dry intact, resilient.

## 2019-10-16 LAB
OVA AND PARASITE, TRICHROME STAIN: NEGATIVE
OVA AND PARASITE, WET MOUNT: NEGATIVE

## 2019-10-17 ENCOUNTER — TELEPHONE (OUTPATIENT)
Dept: FAMILY MEDICINE CLINIC | Facility: CLINIC | Age: 64
End: 2019-10-17

## 2019-10-17 NOTE — TELEPHONE ENCOUNTER
Patient said that when she went to  the Toujeo it was $100. She said she cannot afford this, she did not use a coupon or Good Rx, is there a substitute?

## 2019-10-17 NOTE — TELEPHONE ENCOUNTER
Unfortunately, the only affordable insulins are the old fashion ones which have lots of other issues. All the new insulins are typically between $3-$400 a month.

## 2019-10-17 NOTE — TELEPHONE ENCOUNTER
Patient advised to go to [employer] and get code to use at the pharmacy. Also advised that we sometimes get samples but we do not have any right now. Call us back before next refill is due.

## 2019-10-28 RX ORDER — METOPROLOL TARTRATE 50 MG/1
TABLET, FILM COATED ORAL
Qty: 180 TABLET | Refills: 0 | Status: SHIPPED | OUTPATIENT
Start: 2019-10-28 | End: 2020-01-28

## 2019-12-17 ENCOUNTER — APPOINTMENT (OUTPATIENT)
Dept: LAB | Age: 64
End: 2019-12-17
Attending: FAMILY MEDICINE
Payer: COMMERCIAL

## 2019-12-17 ENCOUNTER — TELEPHONE (OUTPATIENT)
Dept: FAMILY MEDICINE CLINIC | Facility: CLINIC | Age: 64
End: 2019-12-17

## 2019-12-17 ENCOUNTER — OFFICE VISIT (OUTPATIENT)
Dept: FAMILY MEDICINE CLINIC | Facility: CLINIC | Age: 64
End: 2019-12-17

## 2019-12-17 VITALS
BODY MASS INDEX: 33.33 KG/M2 | SYSTOLIC BLOOD PRESSURE: 126 MMHG | HEIGHT: 62 IN | DIASTOLIC BLOOD PRESSURE: 76 MMHG | HEART RATE: 68 BPM | OXYGEN SATURATION: 96 % | TEMPERATURE: 98 F | WEIGHT: 181.13 LBS

## 2019-12-17 DIAGNOSIS — Z79.4 TYPE 2 DIABETES MELLITUS WITHOUT COMPLICATION, WITH LONG-TERM CURRENT USE OF INSULIN (HCC): ICD-10-CM

## 2019-12-17 DIAGNOSIS — I10 ESSENTIAL HYPERTENSION: ICD-10-CM

## 2019-12-17 DIAGNOSIS — J01.00 ACUTE NON-RECURRENT MAXILLARY SINUSITIS: Primary | ICD-10-CM

## 2019-12-17 DIAGNOSIS — Z23 NEED FOR VACCINATION: ICD-10-CM

## 2019-12-17 DIAGNOSIS — E11.9 TYPE 2 DIABETES MELLITUS WITHOUT COMPLICATION, WITH LONG-TERM CURRENT USE OF INSULIN (HCC): ICD-10-CM

## 2019-12-17 PROCEDURE — 80061 LIPID PANEL: CPT

## 2019-12-17 PROCEDURE — 90471 IMMUNIZATION ADMIN: CPT | Performed by: FAMILY MEDICINE

## 2019-12-17 PROCEDURE — 90686 IIV4 VACC NO PRSV 0.5 ML IM: CPT | Performed by: FAMILY MEDICINE

## 2019-12-17 PROCEDURE — 36415 COLL VENOUS BLD VENIPUNCTURE: CPT

## 2019-12-17 PROCEDURE — 80053 COMPREHEN METABOLIC PANEL: CPT

## 2019-12-17 PROCEDURE — 83036 HEMOGLOBIN GLYCOSYLATED A1C: CPT

## 2019-12-17 PROCEDURE — 99214 OFFICE O/P EST MOD 30 MIN: CPT | Performed by: FAMILY MEDICINE

## 2019-12-17 RX ORDER — AMOXICILLIN AND CLAVULANATE POTASSIUM 875; 125 MG/1; MG/1
1 TABLET, FILM COATED ORAL 2 TIMES DAILY
Qty: 20 TABLET | Refills: 0 | Status: SHIPPED | OUTPATIENT
Start: 2019-12-17 | End: 2019-12-27

## 2019-12-17 RX ORDER — LISINOPRIL 10 MG/1
10 TABLET ORAL
Qty: 90 TABLET | Refills: 1 | Status: SHIPPED | OUTPATIENT
Start: 2019-12-17 | End: 2020-07-14

## 2019-12-17 RX ORDER — METFORMIN HYDROCHLORIDE 500 MG/1
500 TABLET, EXTENDED RELEASE ORAL 2 TIMES DAILY WITH MEALS
Qty: 180 TABLET | Refills: 1 | Status: SHIPPED | OUTPATIENT
Start: 2019-12-17 | End: 2020-05-21

## 2019-12-17 NOTE — PROGRESS NOTES
Elle Adams is a 60year old female. Patient presents with:  Diabetes: fup on DM, fasting labs. ..room 2  Sinus Problem: drainage, congestion. ..started saturday-has been taking mucinex and tylenol sinus. ..       HPI:   Patient complains of sinus congesti Essential hypertension    • Psoriasis    • Sleep apnea    • Visual impairment     glasses     Past Surgical History:   Procedure Laterality Date   • BREAST BIOPSY  07/06/2018   • COLONOSCOPY     • HERNIA SURGERY     • HERNIA UMBILICAL REPAIR ADULT N/A 7/3/ distress  SKIN: no rashes,no suspicious lesions  HEENT: atraumatic, normocephalic, R TM normal, L TM normal, Pharynx with PND  NECK: supple, no cervical adenopathy  LUNGS: clear to auscultation  CARDIO: RRR without murmur  ABD soft, nontender, normal BS, n

## 2019-12-18 DIAGNOSIS — E11.9 TYPE 2 DIABETES MELLITUS WITHOUT COMPLICATION, WITH LONG-TERM CURRENT USE OF INSULIN (HCC): Primary | ICD-10-CM

## 2019-12-18 DIAGNOSIS — Z79.4 TYPE 2 DIABETES MELLITUS WITHOUT COMPLICATION, WITH LONG-TERM CURRENT USE OF INSULIN (HCC): Primary | ICD-10-CM

## 2019-12-18 DIAGNOSIS — I10 ESSENTIAL HYPERTENSION: ICD-10-CM

## 2020-01-28 RX ORDER — METOPROLOL TARTRATE 50 MG/1
TABLET, FILM COATED ORAL
Qty: 180 TABLET | Refills: 1 | Status: SHIPPED | OUTPATIENT
Start: 2020-01-28 | End: 2020-08-18

## 2020-01-28 NOTE — TELEPHONE ENCOUNTER
Last refill #180 on 10/28/19  Last office visit on 12/17/19  No future appointments.   BP Readings from Last 3 Encounters:  12/17/19 : 126/76  09/30/19 : 120/72  08/20/19 : 124/80    Labs current until 6/2020    Hypertension Medications Protocol Passed1/28

## 2020-02-04 RX ORDER — INSULIN GLARGINE 300 U/ML
INJECTION, SOLUTION SUBCUTANEOUS
Qty: 8 ML | Refills: 0 | Status: SHIPPED | OUTPATIENT
Start: 2020-02-04 | End: 2020-06-03 | Stop reason: ALTCHOICE

## 2020-02-04 NOTE — TELEPHONE ENCOUNTER
LOV 12-17-19    LAST LAB 12-17-19    LAST RX 09-30-19    Next OV  No future appointments.     PROTOCOL  none

## 2020-03-17 ENCOUNTER — HOSPITAL ENCOUNTER (OUTPATIENT)
Dept: GENERAL RADIOLOGY | Age: 65
Discharge: HOME | End: 2020-03-17
Attending: FAMILY MEDICINE
Payer: COMMERCIAL

## 2020-03-17 ENCOUNTER — OFFICE VISIT (OUTPATIENT)
Dept: FAMILY MEDICINE CLINIC | Facility: CLINIC | Age: 65
End: 2020-03-17

## 2020-03-17 VITALS
BODY MASS INDEX: 33.35 KG/M2 | HEART RATE: 76 BPM | SYSTOLIC BLOOD PRESSURE: 120 MMHG | OXYGEN SATURATION: 99 % | TEMPERATURE: 98 F | WEIGHT: 181.25 LBS | HEIGHT: 62 IN | DIASTOLIC BLOOD PRESSURE: 80 MMHG

## 2020-03-17 DIAGNOSIS — M25.562 CHRONIC PAIN OF LEFT KNEE: ICD-10-CM

## 2020-03-17 DIAGNOSIS — M25.562 CHRONIC PAIN OF LEFT KNEE: Primary | ICD-10-CM

## 2020-03-17 DIAGNOSIS — G89.29 CHRONIC PAIN OF LEFT KNEE: Primary | ICD-10-CM

## 2020-03-17 DIAGNOSIS — E11.9 TYPE 2 DIABETES MELLITUS WITHOUT COMPLICATION, WITH LONG-TERM CURRENT USE OF INSULIN (HCC): ICD-10-CM

## 2020-03-17 DIAGNOSIS — G89.29 CHRONIC PAIN OF LEFT KNEE: ICD-10-CM

## 2020-03-17 DIAGNOSIS — Z79.4 TYPE 2 DIABETES MELLITUS WITHOUT COMPLICATION, WITH LONG-TERM CURRENT USE OF INSULIN (HCC): ICD-10-CM

## 2020-03-17 PROCEDURE — 99214 OFFICE O/P EST MOD 30 MIN: CPT | Performed by: FAMILY MEDICINE

## 2020-03-17 PROCEDURE — 73562 X-RAY EXAM OF KNEE 3: CPT | Performed by: FAMILY MEDICINE

## 2020-03-17 NOTE — PROGRESS NOTES
Boris Lindsay is a 63year old female. Patient presents with:  Knee Pain: fup on LT knee pain-has been going on for a while and was seen for this befrore, but it has gotten worse-the pain is all the time now. ...room 1      HPI:   Patient complains of sev Rfl: 0  Glucose Blood In Vitro Strip, Test 4 times daily, Disp: 390 strip, Rfl: 3  RIZWAN MICROLET LANCETS Does not apply Misc, Test 4 times daily, Disp: 2 Box, Rfl: 11    No current facility-administered medications on file prior to visit.         Past Medi well otherwise  SKIN: denies any unusual skin lesions or rashes  RESPIRATORY: denies shortness of breath   CARDIOVASCULAR: denies chest pain   GI: denies nausea, vomiting, diarrhea or abdominal pain   NEURO: denies headaches    EXAM:   /80   Pulse 76

## 2020-03-18 DIAGNOSIS — M25.562 CHRONIC PAIN OF LEFT KNEE: Primary | ICD-10-CM

## 2020-03-18 DIAGNOSIS — G89.29 CHRONIC PAIN OF LEFT KNEE: Primary | ICD-10-CM

## 2020-05-20 DIAGNOSIS — E11.9 TYPE 2 DIABETES MELLITUS WITHOUT COMPLICATION, WITH LONG-TERM CURRENT USE OF INSULIN (HCC): ICD-10-CM

## 2020-05-20 DIAGNOSIS — Z79.4 TYPE 2 DIABETES MELLITUS WITHOUT COMPLICATION, WITH LONG-TERM CURRENT USE OF INSULIN (HCC): ICD-10-CM

## 2020-05-21 RX ORDER — METFORMIN HYDROCHLORIDE 500 MG/1
TABLET, EXTENDED RELEASE ORAL
Qty: 180 TABLET | Refills: 0 | Status: SHIPPED | OUTPATIENT
Start: 2020-05-21 | End: 2020-06-19

## 2020-05-21 NOTE — TELEPHONE ENCOUNTER
Last office visit:  03/17/20  Last refill:  12/17/19  #180, no refills  Last a1c:  12/17/19  Last micro: 06/19/19    No future appointments. Sent [employer] message that pt is due next month for fasting labs, urine.

## 2020-05-29 ENCOUNTER — TELEPHONE (OUTPATIENT)
Dept: FAMILY MEDICINE CLINIC | Facility: CLINIC | Age: 65
End: 2020-05-29

## 2020-05-29 NOTE — TELEPHONE ENCOUNTER
Patient called and advised that she just heard on the news that they are recalling Metformin because it causes cancer. She has a script to get a refill but she doesn't want to get it if it is being recalled? Has Dr Abel Calderón heard anything about this?  Pat Almeida

## 2020-05-29 NOTE — TELEPHONE ENCOUNTER
Patient instructed; appt made. She has been off insulin due to cost.  She isn't checking her sugars. Dr Tao Flash aware.

## 2020-05-29 NOTE — TELEPHONE ENCOUNTER
No I have not heard anything about that.   Recommend she continue taking it until we get some report that definitively states otherwise

## 2020-06-03 ENCOUNTER — APPOINTMENT (OUTPATIENT)
Dept: LAB | Age: 65
End: 2020-06-03
Attending: FAMILY MEDICINE
Payer: COMMERCIAL

## 2020-06-03 ENCOUNTER — OFFICE VISIT (OUTPATIENT)
Dept: FAMILY MEDICINE CLINIC | Facility: CLINIC | Age: 65
End: 2020-06-03

## 2020-06-03 VITALS
WEIGHT: 176.38 LBS | HEIGHT: 62 IN | SYSTOLIC BLOOD PRESSURE: 120 MMHG | DIASTOLIC BLOOD PRESSURE: 70 MMHG | TEMPERATURE: 99 F | OXYGEN SATURATION: 97 % | BODY MASS INDEX: 32.46 KG/M2 | HEART RATE: 78 BPM

## 2020-06-03 DIAGNOSIS — I10 ESSENTIAL HYPERTENSION: ICD-10-CM

## 2020-06-03 DIAGNOSIS — E11.9 DIABETES MELLITUS (HCC): Primary | ICD-10-CM

## 2020-06-03 DIAGNOSIS — R10.13 EPIGASTRIC PAIN: ICD-10-CM

## 2020-06-03 DIAGNOSIS — E11.9 DIABETES MELLITUS (HCC): ICD-10-CM

## 2020-06-03 DIAGNOSIS — Z79.4 TYPE 2 DIABETES MELLITUS WITHOUT COMPLICATION, WITH LONG-TERM CURRENT USE OF INSULIN (HCC): ICD-10-CM

## 2020-06-03 DIAGNOSIS — E11.9 TYPE 2 DIABETES MELLITUS WITHOUT COMPLICATION, WITH LONG-TERM CURRENT USE OF INSULIN (HCC): ICD-10-CM

## 2020-06-03 PROCEDURE — 82043 UR ALBUMIN QUANTITATIVE: CPT

## 2020-06-03 PROCEDURE — 82570 ASSAY OF URINE CREATININE: CPT

## 2020-06-03 PROCEDURE — 80061 LIPID PANEL: CPT

## 2020-06-03 PROCEDURE — 83036 HEMOGLOBIN GLYCOSYLATED A1C: CPT

## 2020-06-03 PROCEDURE — 80053 COMPREHEN METABOLIC PANEL: CPT

## 2020-06-03 PROCEDURE — 99214 OFFICE O/P EST MOD 30 MIN: CPT | Performed by: FAMILY MEDICINE

## 2020-06-03 PROCEDURE — 36415 COLL VENOUS BLD VENIPUNCTURE: CPT

## 2020-06-03 RX ORDER — PIOGLITAZONEHYDROCHLORIDE 30 MG/1
30 TABLET ORAL DAILY
Qty: 90 TABLET | Refills: 3 | Status: SHIPPED | OUTPATIENT
Start: 2020-06-03 | End: 2021-04-01 | Stop reason: ALTCHOICE

## 2020-06-03 RX ORDER — GLIMEPIRIDE 4 MG/1
2 TABLET ORAL
Qty: 60 TABLET | Refills: 2 | Status: SHIPPED | OUTPATIENT
Start: 2020-06-03 | End: 2021-03-23

## 2020-06-03 NOTE — PROGRESS NOTES
Kendrick Cavazos is a 63year old female. Patient presents with:  Diabetes: fup, due for dm labs, micro, eye exam.... [facility] room 2  HTN: BP check up. ... Diarrhea: on and off diarrhea and some nausea--this has been going on for a while, occasional vomiting. .. COLONOSCOPY     • HERNIA SURGERY     • HERNIA UMBILICAL REPAIR ADULT N/A 7/3/2017    Performed by Kaci Gamez DO at [facility] MAIN OR   • HYSTERECTOMY      lise, bso   • RANDOLPH BIOPSY STEREO NODULE 1 SITE LEFT (CPT=19081)      2018 benign    • RANDOLPH LOCALIZATION WI auscultation  CARDIO: RRR without murmur  ABD soft, nontender, normal BS, no masses, rebound or guarding. No organomegaly or CVA tenderness.   EXTREMITIES: no edema          ASSESSMENT AND PLAN:     Diabetes mellitus (hcc)  (primary encounter diagnosis)  Es

## 2020-06-05 ENCOUNTER — TELEPHONE (OUTPATIENT)
Dept: FAMILY MEDICINE CLINIC | Facility: CLINIC | Age: 65
End: 2020-06-05

## 2020-06-05 DIAGNOSIS — E11.65 TYPE 2 DIABETES MELLITUS WITH HYPERGLYCEMIA, WITH LONG-TERM CURRENT USE OF INSULIN (HCC): Primary | ICD-10-CM

## 2020-06-05 DIAGNOSIS — Z79.4 TYPE 2 DIABETES MELLITUS WITH HYPERGLYCEMIA, WITH LONG-TERM CURRENT USE OF INSULIN (HCC): Primary | ICD-10-CM

## 2020-06-08 ENCOUNTER — TELEPHONE (OUTPATIENT)
Dept: FAMILY MEDICINE CLINIC | Facility: CLINIC | Age: 65
End: 2020-06-08

## 2020-06-18 DIAGNOSIS — E11.9 TYPE 2 DIABETES MELLITUS WITHOUT COMPLICATION, WITH LONG-TERM CURRENT USE OF INSULIN (HCC): ICD-10-CM

## 2020-06-18 DIAGNOSIS — Z79.4 TYPE 2 DIABETES MELLITUS WITHOUT COMPLICATION, WITH LONG-TERM CURRENT USE OF INSULIN (HCC): ICD-10-CM

## 2020-06-20 RX ORDER — METFORMIN HYDROCHLORIDE 500 MG/1
500 TABLET, EXTENDED RELEASE ORAL 2 TIMES DAILY WITH MEALS
Qty: 180 TABLET | Refills: 0 | Status: SHIPPED | OUTPATIENT
Start: 2020-06-20 | End: 2020-09-09

## 2020-06-22 ENCOUNTER — TELEPHONE (OUTPATIENT)
Dept: FAMILY MEDICINE CLINIC | Facility: CLINIC | Age: 65
End: 2020-06-22

## 2020-06-22 NOTE — TELEPHONE ENCOUNTER
Pt advised that Metformin has been refilled using different  that was not recalled. She was advised it is safe to take. Confirmed this with Tori Jenkins. Pt verbalized understanding.

## 2020-07-14 RX ORDER — LISINOPRIL 10 MG/1
TABLET ORAL
Qty: 90 TABLET | Refills: 0 | Status: SHIPPED | OUTPATIENT
Start: 2020-07-14 | End: 2020-10-24

## 2020-07-14 NOTE — TELEPHONE ENCOUNTER
Last office visit: 06/03/20  Last refill:  12/17/20  #90, 1 refill  Last bp:  06/03/20   120/70  Last cmp:  06/03/20    No future appointments.

## 2020-08-18 RX ORDER — METOPROLOL TARTRATE 50 MG/1
TABLET, FILM COATED ORAL
Qty: 180 TABLET | Refills: 0 | Status: SHIPPED | OUTPATIENT
Start: 2020-08-18 | End: 2020-11-16

## 2020-08-18 NOTE — TELEPHONE ENCOUNTER
Last refill: 01/28/20  Qty: 180  W/ 1 refills  Last ov: 06/03/20    Requested Prescriptions     Pending Prescriptions Disp Refills   • METOPROLOL TARTRATE 50 MG Oral Tab [Pharmacy Med Name: Metoprolol Tartrate 50 MG Oral Tablet] 180 tablet 0     Sig: Take

## 2020-08-20 ENCOUNTER — TELEPHONE (OUTPATIENT)
Dept: ENDOCRINOLOGY CLINIC | Facility: CLINIC | Age: 65
End: 2020-08-20

## 2020-08-20 NOTE — TELEPHONE ENCOUNTER
Pt's A1C 11.4 per [employer].  Called and LVM offering appt with APN at diabetes clinic. Phone # provided.

## 2020-08-28 ENCOUNTER — TELEPHONE (OUTPATIENT)
Dept: FAMILY MEDICINE CLINIC | Facility: CLINIC | Age: 65
End: 2020-08-28

## 2020-08-28 NOTE — TELEPHONE ENCOUNTER
Calling Lillian-we need to get her in here for a fingerstick a1c. Left a message for pt to call back and schedule lab appt.

## 2020-09-08 DIAGNOSIS — E11.9 TYPE 2 DIABETES MELLITUS WITHOUT COMPLICATION, WITH LONG-TERM CURRENT USE OF INSULIN (HCC): ICD-10-CM

## 2020-09-08 DIAGNOSIS — Z79.4 TYPE 2 DIABETES MELLITUS WITHOUT COMPLICATION, WITH LONG-TERM CURRENT USE OF INSULIN (HCC): ICD-10-CM

## 2020-09-09 RX ORDER — METFORMIN HYDROCHLORIDE 500 MG/1
TABLET, EXTENDED RELEASE ORAL
Qty: 180 TABLET | Refills: 0 | Status: SHIPPED | OUTPATIENT
Start: 2020-09-09 | End: 2020-12-22

## 2020-09-09 NOTE — TELEPHONE ENCOUNTER
Last office visit: 06/03/20  Last refill:  06/20/20  #180, no refills  Last a1c:  06/03/20  Last micro:  06/03/20      No future appointments. Calling AGAIN to schedule a nurse visit a1c finger stick.      Future Appointments   Date Time Provider Ansel Burkitt

## 2020-09-11 ENCOUNTER — TELEPHONE (OUTPATIENT)
Dept: FAMILY MEDICINE CLINIC | Facility: CLINIC | Age: 65
End: 2020-09-11

## 2020-09-11 ENCOUNTER — NURSE ONLY (OUTPATIENT)
Dept: FAMILY MEDICINE CLINIC | Facility: CLINIC | Age: 65
End: 2020-09-11

## 2020-09-11 DIAGNOSIS — E11.65 TYPE 2 DIABETES MELLITUS WITH HYPERGLYCEMIA, WITH LONG-TERM CURRENT USE OF INSULIN (HCC): Primary | ICD-10-CM

## 2020-09-11 DIAGNOSIS — E11.9 TYPE 2 DIABETES MELLITUS WITHOUT COMPLICATION, WITH LONG-TERM CURRENT USE OF INSULIN (HCC): ICD-10-CM

## 2020-09-11 DIAGNOSIS — I10 ESSENTIAL HYPERTENSION: ICD-10-CM

## 2020-09-11 DIAGNOSIS — E11.9 TYPE 2 DIABETES MELLITUS WITHOUT COMPLICATION, WITH LONG-TERM CURRENT USE OF INSULIN (HCC): Primary | ICD-10-CM

## 2020-09-11 DIAGNOSIS — Z79.4 TYPE 2 DIABETES MELLITUS WITH HYPERGLYCEMIA, WITH LONG-TERM CURRENT USE OF INSULIN (HCC): Primary | ICD-10-CM

## 2020-09-11 DIAGNOSIS — Z79.4 TYPE 2 DIABETES MELLITUS WITHOUT COMPLICATION, WITH LONG-TERM CURRENT USE OF INSULIN (HCC): ICD-10-CM

## 2020-09-11 DIAGNOSIS — Z79.4 TYPE 2 DIABETES MELLITUS WITHOUT COMPLICATION, WITH LONG-TERM CURRENT USE OF INSULIN (HCC): Primary | ICD-10-CM

## 2020-09-11 LAB — HEMOGLOBIN A1C: 7.2 % (ref 4.3–5.6)

## 2020-09-11 PROCEDURE — 83036 HEMOGLOBIN GLYCOSYLATED A1C: CPT | Performed by: FAMILY MEDICINE

## 2020-09-11 RX ORDER — THIAMINE HCL 100 MG
TABLET ORAL DAILY
Refills: 0 | COMMUNITY
Start: 2020-09-11

## 2020-09-11 RX ORDER — METFORMIN HYDROCHLORIDE 500 MG/1
500 TABLET, EXTENDED RELEASE ORAL 2 TIMES DAILY WITH MEALS
Qty: 180 TABLET | Refills: 1 | Status: CANCELLED | OUTPATIENT
Start: 2020-09-11

## 2020-09-11 RX ORDER — BIOTIN 1 MG
1 TABLET ORAL DAILY
Qty: 30 CAPSULE | Refills: 0 | COMMUNITY
Start: 2020-09-11

## 2020-09-11 RX ORDER — MULTIVIT WITH MINERALS/LUTEIN
1000 TABLET ORAL DAILY
Refills: 0 | COMMUNITY
Start: 2020-09-11

## 2020-09-11 NOTE — TELEPHONE ENCOUNTER
9/11/20 - HgbA1C 7.2  Med list has been updated. Rosi Montes is unable to afford lantus so she got some vials from a friend who is no longer taking this med. Rosi Montes is wondering if she should continue taking Lantus 30 units nightly.  Started administering i

## 2020-10-06 ENCOUNTER — TELEPHONE (OUTPATIENT)
Dept: FAMILY MEDICINE CLINIC | Facility: CLINIC | Age: 65
End: 2020-10-06

## 2020-10-06 DIAGNOSIS — Z12.31 BREAST CANCER SCREENING BY MAMMOGRAM: Primary | ICD-10-CM

## 2020-10-06 NOTE — TELEPHONE ENCOUNTER
Order placed. [facility]

## 2020-10-14 ENCOUNTER — IMMUNIZATION (OUTPATIENT)
Dept: FAMILY MEDICINE CLINIC | Facility: CLINIC | Age: 65
End: 2020-10-14

## 2020-10-14 DIAGNOSIS — Z23 NEED FOR VACCINATION: ICD-10-CM

## 2020-10-14 PROCEDURE — 90471 IMMUNIZATION ADMIN: CPT | Performed by: FAMILY MEDICINE

## 2020-10-14 PROCEDURE — 90686 IIV4 VACC NO PRSV 0.5 ML IM: CPT | Performed by: FAMILY MEDICINE

## 2020-10-24 RX ORDER — LISINOPRIL 10 MG/1
TABLET ORAL
Qty: 90 TABLET | Refills: 0 | Status: SHIPPED | OUTPATIENT
Start: 2020-10-24 | End: 2020-12-22

## 2020-10-24 NOTE — TELEPHONE ENCOUNTER
Last refill: 07/14/20  Qty: 90  W/ 0 refills  Last ov: 06/03/20    Requested Prescriptions     Pending Prescriptions Disp Refills   • LISINOPRIL 10 MG Oral Tab [Pharmacy Med Name: Lisinopril 10 MG Oral Tablet] 90 tablet 0     Sig: Take 1 tablet by mouth on

## 2020-10-29 ENCOUNTER — TELEPHONE (OUTPATIENT)
Dept: FAMILY MEDICINE CLINIC | Facility: CLINIC | Age: 65
End: 2020-10-29

## 2020-10-29 NOTE — TELEPHONE ENCOUNTER
Patient said that she has had some lower abdominal pain for 3-4 days that feels like \"gas\". She said that she has chronic diarrhea for over a year. She said that she had diarrhea 3-4 times today.  She said she has had this pain in the past. She has tried

## 2020-10-30 ENCOUNTER — OFFICE VISIT (OUTPATIENT)
Dept: FAMILY MEDICINE CLINIC | Facility: CLINIC | Age: 65
End: 2020-10-30

## 2020-10-30 ENCOUNTER — LAB ENCOUNTER (OUTPATIENT)
Dept: LAB | Age: 65
End: 2020-10-30
Attending: FAMILY MEDICINE
Payer: COMMERCIAL

## 2020-10-30 VITALS
BODY MASS INDEX: 36 KG/M2 | HEART RATE: 78 BPM | SYSTOLIC BLOOD PRESSURE: 124 MMHG | OXYGEN SATURATION: 98 % | WEIGHT: 196.38 LBS | TEMPERATURE: 98 F | DIASTOLIC BLOOD PRESSURE: 60 MMHG

## 2020-10-30 DIAGNOSIS — R63.5 WEIGHT GAIN: ICD-10-CM

## 2020-10-30 DIAGNOSIS — R14.0 ABDOMINAL BLOATING: ICD-10-CM

## 2020-10-30 DIAGNOSIS — K58.0 IRRITABLE BOWEL SYNDROME WITH DIARRHEA: ICD-10-CM

## 2020-10-30 DIAGNOSIS — R10.9 ABDOMINAL CRAMPING: Primary | ICD-10-CM

## 2020-10-30 DIAGNOSIS — R10.9 ABDOMINAL CRAMPING: ICD-10-CM

## 2020-10-30 PROCEDURE — 84443 ASSAY THYROID STIM HORMONE: CPT

## 2020-10-30 PROCEDURE — 3078F DIAST BP <80 MM HG: CPT | Performed by: NURSE PRACTITIONER

## 2020-10-30 PROCEDURE — 86003 ALLG SPEC IGE CRUDE XTRC EA: CPT

## 2020-10-30 PROCEDURE — 82785 ASSAY OF IGE: CPT

## 2020-10-30 PROCEDURE — 36415 COLL VENOUS BLD VENIPUNCTURE: CPT

## 2020-10-30 PROCEDURE — 99214 OFFICE O/P EST MOD 30 MIN: CPT | Performed by: NURSE PRACTITIONER

## 2020-10-30 PROCEDURE — 3074F SYST BP LT 130 MM HG: CPT | Performed by: NURSE PRACTITIONER

## 2020-10-30 NOTE — PROGRESS NOTES
HPI: HPI   Patient is here for chronic abdominal pain. Has been occurring for over a year. Symptoms worsened in the last 3-4 days. Pain radiates to her back. Denies N/V or heartburn. Drinking well.  Decreased appetite last night but as able to eat break Anxiety. 30 tablet 0   • Triamcinolone Acetonide (NASACORT ALLERGY 24HR) 55 MCG/ACT Nasal Aerosol 1 spray by Nasal route as needed.           Past Medical History:   Diagnosis Date   • Allergic rhinitis    • Anxiety state    • Calculus of kidney    • Depres for cough. Cardiovascular: Negative for chest pain. Gastrointestinal: Positive for abdominal pain and diarrhea. Negative for heartburn, nausea, vomiting and blood in stool. Genitourinary: Negative for dysuria, urgency and hematuria.         EXAM:   B

## 2020-10-30 NOTE — TELEPHONE ENCOUNTER
Future Appointments   Date Time Provider Gladis Sutton   10/30/2020 11:30 AM CORNEL Olivier [facility] [place]

## 2020-11-05 ENCOUNTER — HOSPITAL ENCOUNTER (OUTPATIENT)
Dept: MAMMOGRAPHY | Age: 65
Discharge: HOME | End: 2020-11-05
Attending: FAMILY MEDICINE
Payer: COMMERCIAL

## 2020-11-05 DIAGNOSIS — Z12.31 BREAST CANCER SCREENING BY MAMMOGRAM: ICD-10-CM

## 2020-11-05 PROCEDURE — 77067 SCR MAMMO BI INCL CAD: CPT | Performed by: FAMILY MEDICINE

## 2020-11-16 RX ORDER — METOPROLOL TARTRATE 50 MG/1
TABLET, FILM COATED ORAL
Qty: 180 TABLET | Refills: 0 | Status: SHIPPED | OUTPATIENT
Start: 2020-11-16 | End: 2021-02-16

## 2020-11-16 NOTE — TELEPHONE ENCOUNTER
Last refill: 08/18/20  Qty: 180  W/ 0 refills  Last ov: 06/03/20    Requested Prescriptions     Pending Prescriptions Disp Refills   • METOPROLOL TARTRATE 50 MG Oral Tab [Pharmacy Med Name: Metoprolol Tartrate 50 MG Oral Tablet] 180 tablet 0     Sig: Take

## 2020-11-19 ENCOUNTER — HOSPITAL ENCOUNTER (OUTPATIENT)
Dept: ULTRASOUND IMAGING | Age: 65
Discharge: HOME | End: 2020-11-19
Attending: NURSE PRACTITIONER
Payer: COMMERCIAL

## 2020-11-19 DIAGNOSIS — R63.5 WEIGHT GAIN: ICD-10-CM

## 2020-11-19 DIAGNOSIS — R14.0 ABDOMINAL BLOATING: ICD-10-CM

## 2020-11-19 DIAGNOSIS — K58.0 IRRITABLE BOWEL SYNDROME WITH DIARRHEA: ICD-10-CM

## 2020-11-19 DIAGNOSIS — R10.9 ABDOMINAL CRAMPING: ICD-10-CM

## 2020-11-19 PROCEDURE — 76700 US EXAM ABDOM COMPLETE: CPT | Performed by: NURSE PRACTITIONER

## 2020-11-30 ENCOUNTER — TELEPHONE (OUTPATIENT)
Dept: FAMILY MEDICINE CLINIC | Facility: CLINIC | Age: 65
End: 2020-11-30

## 2020-11-30 NOTE — TELEPHONE ENCOUNTER
PT. LEFT A VM THAT SHE HAS BEEN SELF QUARANTINED AND SHE IS ASKING FOR A DOCS NOTE TO RETURN TO WORK.

## 2020-11-30 NOTE — TELEPHONE ENCOUNTER
Lencho Dong states she was exposed by 2 sisters who are COVID+    She's quarantined since 11/20 - denies sx. 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 She will be calling her employer to ask about this.

## 2020-12-21 DIAGNOSIS — E11.9 TYPE 2 DIABETES MELLITUS WITHOUT COMPLICATION, WITH LONG-TERM CURRENT USE OF INSULIN (HCC): ICD-10-CM

## 2020-12-21 DIAGNOSIS — Z79.4 TYPE 2 DIABETES MELLITUS WITHOUT COMPLICATION, WITH LONG-TERM CURRENT USE OF INSULIN (HCC): ICD-10-CM

## 2020-12-22 RX ORDER — LISINOPRIL 10 MG/1
10 TABLET ORAL DAILY
Qty: 90 TABLET | Refills: 0 | Status: SHIPPED | OUTPATIENT
Start: 2020-12-22 | End: 2021-04-26

## 2020-12-22 RX ORDER — METFORMIN HYDROCHLORIDE 500 MG/1
TABLET, EXTENDED RELEASE ORAL
Qty: 180 TABLET | Refills: 0 | Status: SHIPPED | OUTPATIENT
Start: 2020-12-22 | End: 2021-03-29

## 2020-12-22 NOTE — TELEPHONE ENCOUNTER
Last office visit:  10/30/20-Estefany  Last a1c:  09/11/20  Last micro: 06/30/20  Last refill:  09/09/20  #180, no refills       No future appointments.

## 2020-12-22 NOTE — TELEPHONE ENCOUNTER
Last office visit:  10/30/20--Estefany office visit  Last refill: 10/24/20   #90, no refills  Last cmp:  06/03/20  Last bp:  10/30/20   124/60      No future appointments.

## 2021-02-05 NOTE — TELEPHONE ENCOUNTER
The Lantus is $700.00 she needs a different med. ambulation/functional activities/self-care/stair negotiation/transfers

## 2021-02-16 RX ORDER — METOPROLOL TARTRATE 50 MG/1
50 TABLET, FILM COATED ORAL 2 TIMES DAILY
Qty: 180 TABLET | Refills: 0 | Status: SHIPPED | OUTPATIENT
Start: 2021-02-16 | End: 2021-05-25

## 2021-02-16 NOTE — TELEPHONE ENCOUNTER
LOV: 10/30/20    LAST LAB: 6/30/20 -due for repeat labs, pt advised. LAST RX: 11/16/20    Next OV: No future appointments.       PROTOCOL    Hypertension Medications Protocol [id number]/2021 02:30 AM   CMP or BMP in past 12 months Protocol Details    L

## 2021-03-22 ENCOUNTER — LAB ENCOUNTER (OUTPATIENT)
Dept: LAB | Age: 66
End: 2021-03-22
Attending: FAMILY MEDICINE
Payer: COMMERCIAL

## 2021-03-22 DIAGNOSIS — I10 ESSENTIAL HYPERTENSION: ICD-10-CM

## 2021-03-22 DIAGNOSIS — Z79.4 TYPE 2 DIABETES MELLITUS WITHOUT COMPLICATION, WITH LONG-TERM CURRENT USE OF INSULIN (HCC): Primary | ICD-10-CM

## 2021-03-22 DIAGNOSIS — E11.9 TYPE 2 DIABETES MELLITUS WITHOUT COMPLICATION, WITH LONG-TERM CURRENT USE OF INSULIN (HCC): Primary | ICD-10-CM

## 2021-03-22 LAB
ALBUMIN SERPL-MCNC: 3.9 G/DL (ref 3.4–5)
ALBUMIN/GLOB SERPL: 1 {RATIO} (ref 1–2)
ALP LIVER SERPL-CCNC: 81 U/L
ALT SERPL-CCNC: 45 U/L
ANION GAP SERPL CALC-SCNC: 7 MMOL/L (ref 0–18)
AST SERPL-CCNC: 15 U/L (ref 15–37)
BILIRUB SERPL-MCNC: 0.4 MG/DL (ref 0.1–2)
BUN BLD-MCNC: 14 MG/DL (ref 7–18)
BUN/CREAT SERPL: 19.2 (ref 10–20)
CALCIUM BLD-MCNC: 9.8 MG/DL (ref 8.5–10.1)
CHLORIDE SERPL-SCNC: 103 MMOL/L (ref 98–112)
CHOLEST SMN-MCNC: 145 MG/DL (ref ?–200)
CO2 SERPL-SCNC: 28 MMOL/L (ref 21–32)
CREAT BLD-MCNC: 0.73 MG/DL
EST. AVERAGE GLUCOSE BLD GHB EST-MCNC: 217 MG/DL (ref 68–126)
GLOBULIN PLAS-MCNC: 4 G/DL (ref 2.8–4.4)
GLUCOSE BLD-MCNC: 269 MG/DL (ref 70–99)
HBA1C MFR BLD HPLC: 9.2 % (ref ?–5.7)
HDLC SERPL-MCNC: 55 MG/DL (ref 40–59)
LDLC SERPL CALC-MCNC: 67 MG/DL (ref ?–100)
M PROTEIN MFR SERPL ELPH: 7.9 G/DL (ref 6.4–8.2)
NONHDLC SERPL-MCNC: 90 MG/DL (ref ?–130)
OSMOLALITY SERPL CALC.SUM OF ELEC: 296 MOSM/KG (ref 275–295)
PATIENT FASTING Y/N/NP: YES
PATIENT FASTING Y/N/NP: YES
POTASSIUM SERPL-SCNC: 4.6 MMOL/L (ref 3.5–5.1)
SODIUM SERPL-SCNC: 138 MMOL/L (ref 136–145)
TRIGL SERPL-MCNC: 114 MG/DL (ref 30–149)
VLDLC SERPL CALC-MCNC: 23 MG/DL (ref 0–30)

## 2021-03-22 PROCEDURE — 80061 LIPID PANEL: CPT

## 2021-03-22 PROCEDURE — 36415 COLL VENOUS BLD VENIPUNCTURE: CPT

## 2021-03-22 PROCEDURE — 83036 HEMOGLOBIN GLYCOSYLATED A1C: CPT

## 2021-03-22 PROCEDURE — 80053 COMPREHEN METABOLIC PANEL: CPT

## 2021-03-23 DIAGNOSIS — E11.9 TYPE 2 DIABETES MELLITUS WITHOUT COMPLICATION, WITH LONG-TERM CURRENT USE OF INSULIN (HCC): Primary | ICD-10-CM

## 2021-03-23 DIAGNOSIS — Z79.4 TYPE 2 DIABETES MELLITUS WITHOUT COMPLICATION, WITH LONG-TERM CURRENT USE OF INSULIN (HCC): Primary | ICD-10-CM

## 2021-03-23 DIAGNOSIS — E11.9 DIABETES MELLITUS (HCC): ICD-10-CM

## 2021-03-23 RX ORDER — GLIMEPIRIDE 4 MG/1
4 TABLET ORAL
Qty: 90 TABLET | Refills: 0 | Status: SHIPPED | OUTPATIENT
Start: 2021-03-23 | End: 2021-04-01 | Stop reason: ALTCHOICE

## 2021-03-23 RX ORDER — BLOOD SUGAR DIAGNOSTIC
STRIP MISCELLANEOUS
Qty: 100 STRIP | Refills: 0 | COMMUNITY
Start: 2021-03-23

## 2021-03-23 RX ORDER — LANCETS 30 GAUGE
1 EACH MISCELLANEOUS 4 TIMES DAILY
Qty: 200 EACH | Refills: 0 | COMMUNITY
Start: 2021-03-23

## 2021-03-23 RX ORDER — CHOLECALCIFEROL (VITAMIN D3) 125 MCG
5 CAPSULE ORAL NIGHTLY PRN
Qty: 30 TABLET | Refills: 0 | COMMUNITY
Start: 2021-03-23

## 2021-03-23 RX ORDER — NICOTINE POLACRILEX 4 MG/1
1 GUM, CHEWING ORAL DAILY PRN
Qty: 30 TABLET | Refills: 0 | COMMUNITY
Start: 2021-03-23

## 2021-03-26 ENCOUNTER — TELEPHONE (OUTPATIENT)
Dept: FAMILY MEDICINE CLINIC | Facility: CLINIC | Age: 66
End: 2021-03-26

## 2021-03-29 DIAGNOSIS — Z79.4 TYPE 2 DIABETES MELLITUS WITHOUT COMPLICATION, WITH LONG-TERM CURRENT USE OF INSULIN (HCC): ICD-10-CM

## 2021-03-29 DIAGNOSIS — E11.9 TYPE 2 DIABETES MELLITUS WITHOUT COMPLICATION, WITH LONG-TERM CURRENT USE OF INSULIN (HCC): ICD-10-CM

## 2021-03-29 RX ORDER — METFORMIN HYDROCHLORIDE 500 MG/1
TABLET, EXTENDED RELEASE ORAL
Qty: 180 TABLET | Refills: 0 | Status: SHIPPED | OUTPATIENT
Start: 2021-03-29 | End: 2021-06-28

## 2021-03-29 NOTE — TELEPHONE ENCOUNTER
Last refill #180 on 12/22/2020  Last office visit pertaining to refill on 10/30/2020  Future Appointments   Date Time Provider Gladis Sutton   4/1/2021  9:30 AM Refugia Nail, DO EM[facility] [place]     Last A1c value was 9.2% done 3/22/2021.

## 2021-04-01 ENCOUNTER — OFFICE VISIT (OUTPATIENT)
Dept: FAMILY MEDICINE CLINIC | Facility: CLINIC | Age: 66
End: 2021-04-01

## 2021-04-01 VITALS
BODY MASS INDEX: 35.65 KG/M2 | WEIGHT: 196.19 LBS | DIASTOLIC BLOOD PRESSURE: 82 MMHG | RESPIRATION RATE: 12 BRPM | SYSTOLIC BLOOD PRESSURE: 130 MMHG | HEART RATE: 65 BPM | HEIGHT: 62.25 IN | TEMPERATURE: 98 F

## 2021-04-01 DIAGNOSIS — Z00.00 PREVENTATIVE HEALTH CARE: Primary | ICD-10-CM

## 2021-04-01 DIAGNOSIS — E11.9 TYPE 2 DIABETES MELLITUS WITHOUT COMPLICATION, WITHOUT LONG-TERM CURRENT USE OF INSULIN (HCC): ICD-10-CM

## 2021-04-01 DIAGNOSIS — I10 ESSENTIAL HYPERTENSION: ICD-10-CM

## 2021-04-01 PROCEDURE — 3075F SYST BP GE 130 - 139MM HG: CPT | Performed by: FAMILY MEDICINE

## 2021-04-01 PROCEDURE — G0438 PPPS, INITIAL VISIT: HCPCS | Performed by: FAMILY MEDICINE

## 2021-04-01 PROCEDURE — 99396 PREV VISIT EST AGE 40-64: CPT | Performed by: FAMILY MEDICINE

## 2021-04-01 PROCEDURE — 3079F DIAST BP 80-89 MM HG: CPT | Performed by: FAMILY MEDICINE

## 2021-04-01 PROCEDURE — 3008F BODY MASS INDEX DOCD: CPT | Performed by: FAMILY MEDICINE

## 2021-04-01 RX ORDER — FLASH GLUCOSE SENSOR
1 KIT MISCELLANEOUS AS NEEDED
Qty: 1 EACH | Refills: 0 | Status: SHIPPED | OUTPATIENT
Start: 2021-04-01

## 2021-04-01 NOTE — PROGRESS NOTES
Alden Cramer is a 61year old female. Patient presents with:  Physical:                          Room 2. HPI:   Chivo Fernandes is doing well. Her recent labs looked good other than her A1c.   Her prescription coverage is not good for any of the newer agents rhinitis    • Anxiety state    • Calculus of kidney    • Depression    • DM type 2 (diabetes mellitus, type 2) ([facility]    • Essential hypertension    • Psoriasis    • Sleep apnea    • Visual impairment     glasses     Past Surgical History:   Procedure Later denies headaches    EXAM:   /82   Pulse 65   Temp 98.1 °F (36.7 °C) (Temporal)   Resp 12   Ht 5' 2.25\" (1.581 m)   Wt 196 lb 3.2 oz (89 kg)   BMI 35.60 kg/m²   Wt Readings from Last 6 Encounters:  04/01/21 : 196 lb 3.2 oz (89 kg)  10/30/20 : 196 lb g/dL Final   • A/G Ratio 03/22/2021 1.0  1.0 - 2.0 Final   • FASTING 03/22/2021 Yes   Final   • Cholesterol, Total 03/22/2021 145  <200 mg/dL Final    Desirable  <200 mg/dL  Borderline  200-239 mg/dL  High      >=240 mg/dL       • HDL Cholesterol 03/22/202 glimepiride. Continue the Metformin. Her sugars will likely be very high until we adjust the insulin dose. She needs to record her sugar fasting every morning and 2 hours after meals.   I strongly suspect we will need to switch to 7030 to help with mealt

## 2021-04-26 RX ORDER — LISINOPRIL 10 MG/1
TABLET ORAL
Qty: 90 TABLET | Refills: 0 | Status: SHIPPED | OUTPATIENT
Start: 2021-04-26 | End: 2021-08-04

## 2021-04-26 NOTE — TELEPHONE ENCOUNTER
Hypertension Medications Protocol [id number]/26/2021 11:58 AM   CMP or BMP in past 12 months Protocol Details    Last serum creatinine< 2.0     Appointment in past 6 or next 3 months      Last refill - 12/22/20 - #90   Last CMP - 3/22/21 - creatinine - 0.73 PROGRESS NOTE    Patient name Jahaira and date of birth 1997 was confirmed.    Start time: 4:13pm  Time spent with patient: 55 minutes- video appointment due to covid-19  she forgot her appointment and she was accommodated in this writers schedule for a full appointment,     Jahaira consented to video call and they were answering the call from home.  This writer spoke to patient while they were in their home for the duration of the video call.    TREATMENT PLAN  This plan was made in collaboration with patient.      Goal #1:  Target issue: \"having a better mood\" \"improve relationship with parents\" \"\"how to respond better to mom\", \"not so confrontational\"   Interventions: CBT, active listening , refer patient to a psychiatrist, develop awareness of cognitive messages, use of thought log, challenge distorted thoughts and replace them for realistic thoughts.  Desired Outcome: GAF needs to increased, patient to report social interactions, mobilizing and more daily activities.  Barriers: Patient minimization of symptoms, family dynamics impacted by parent mental health  Review date:every two months    Data/presenting issue(s) and notable behavior/thought process/ affect  Jahaira explained difficulties with dissapointment feelings since the last session she discussed the changes in her friends school plans, and the impact that has on Jahaira's future school plans, I am mad at her, she is making this decision so last minute, the school will find Jahaira a room mate, but she is hurt that her friend is doing this to her, i've been ignoring her for the most part, I canceled on the trip to the [facility],   Jahaira described and processed the intense hurt from this being the second hurtful situation by this person.  If I had a plan B, but I don't, my parents are giving my room away to \"G\" and so I have to go away to school.  She is a bit more anxious over leaving for college without \"C\".  She is still trying to figure out the job  situation and holding a second job- it is what she described as a waiting game- because she needs money for the car payment and insurance monthly. Melatonin has been helpful with her sleep, they work pretty well, but more recently has not been taking them regularly, and has seen herself struggle to fall asleep at night and early waking,      Jahaira NOT present onsite. She participated fully. She shows fair insight into presenting problems.    Therapeutic intervention provided:   CBTintervnetions -- Therapist took Person-centered approach, engaged in empathic listening and promoting positive self-regard as it relates to Jahaira's presenting problem of anxiety. Therapist collaborated with Jahaira to identify motivation as natural supports and personal strengths., Therapist used Cognitive Behavioral Therapy to reframe thought related to patients presenting problem of anxiety and demonstrate connection between patient's reported thoughts, feelings and behaviors. Therapist used Motivational Interviewing in discussion of barriers to progress as it relates to treatment goal of \"having a better mood\" \"improve relationship with parents\" \"\"how to respond better to mom\", \"not so confrontational\" .    Assessment/Progress  Based on Jahaira's report and this counselors observations, Jahaira's mood is unchanged since last visit.  Jahaira continues to meet criteria for Depression single major episode moderate F32.1, Anxiety F41.1.     Risk factors:  Jahaira denies thoughts of risk of harm to self or others.     Patient will go to the nearest emergency room or call 9-1-1 if patient ever reports suicidal feelings, thoughts or plans, or if they believe patient may be a threat to self or others.     Plan/ Client's planned actions toward goals:  Jahaira reports she will practice continue with her school goals.     Plan Related to Treatment Services:  Based upon the above information the following recommendations are made:    · Continue  individual  Therapy   · Frequency of sessions: 2 weeks    She was made aware of these recommendations and did agree to them.  She was made aware of how to contact the undersigned in case of an emergency.      Electronically signed, Keyla Mario LCSW 06/11/2020

## 2021-04-27 ENCOUNTER — TELEPHONE (OUTPATIENT)
Dept: FAMILY MEDICINE CLINIC | Facility: CLINIC | Age: 66
End: 2021-04-27

## 2021-04-27 RX ORDER — INSULIN GLARGINE 100 [IU]/ML
30 INJECTION, SOLUTION SUBCUTANEOUS NIGHTLY
Qty: 15 ML | Refills: 3 | Status: SHIPPED | OUTPATIENT
Start: 2021-04-27 | End: 2021-12-08 | Stop reason: ALTCHOICE

## 2021-04-27 NOTE — TELEPHONE ENCOUNTER
FYI - Spoke to pharmacist, she states Lantus was approved by insurance. It will cost $83 x 1 box (50 day supply).

## 2021-04-27 NOTE — TELEPHONE ENCOUNTER
Basaglar (Insulin glargine) is not covered by plan. Spent over 1hr on phone with insurance trying to figure out what is on formulary. They were not able to provide this info and kept transferring to diff dept.      Spoke to [facility]. Lisa mcguire

## 2021-05-25 RX ORDER — METOPROLOL TARTRATE 50 MG/1
TABLET, FILM COATED ORAL
Qty: 180 TABLET | Refills: 0 | Status: SHIPPED | OUTPATIENT
Start: 2021-05-25 | End: 2021-08-30

## 2021-05-25 NOTE — TELEPHONE ENCOUNTER
Last refill; 02/16/21  Qty: 180  W/ 0 refills  Last ov: 04/01/21    Requested Prescriptions     Pending Prescriptions Disp Refills   • METOPROLOL TARTRATE 50 MG Oral Tab [Pharmacy Med Name: Metoprolol Tartrate 50 MG Oral Tablet] 180 tablet 0     Sig: Take

## 2021-06-27 DIAGNOSIS — Z79.4 TYPE 2 DIABETES MELLITUS WITHOUT COMPLICATION, WITH LONG-TERM CURRENT USE OF INSULIN (HCC): ICD-10-CM

## 2021-06-27 DIAGNOSIS — E11.9 TYPE 2 DIABETES MELLITUS WITHOUT COMPLICATION, WITH LONG-TERM CURRENT USE OF INSULIN (HCC): ICD-10-CM

## 2021-06-28 RX ORDER — METFORMIN HYDROCHLORIDE 500 MG/1
TABLET, EXTENDED RELEASE ORAL
Qty: 180 TABLET | Refills: 0 | Status: SHIPPED | OUTPATIENT
Start: 2021-06-28 | End: 2021-09-29

## 2021-06-28 NOTE — TELEPHONE ENCOUNTER
Last refill: 03/29/21  Qty: 180  W/ 0 refills  Last ov: 04/01/21    Requested Prescriptions     Pending Prescriptions Disp Refills   • METFORMIN HCL  MG Oral Tablet 24 Hr [Pharmacy Med Name: metFORMIN HCl  MG Oral Tablet Extended Release 24 Mariella Salvador

## 2021-07-06 ENCOUNTER — TELEPHONE (OUTPATIENT)
Dept: FAMILY MEDICINE CLINIC | Facility: CLINIC | Age: 66
End: 2021-07-06

## 2021-07-20 ENCOUNTER — TELEPHONE (OUTPATIENT)
Dept: LAB | Age: 66
End: 2021-07-20

## 2021-07-20 DIAGNOSIS — Z79.4 TYPE 2 DIABETES MELLITUS WITHOUT COMPLICATION, WITH LONG-TERM CURRENT USE OF INSULIN (HCC): Primary | ICD-10-CM

## 2021-07-20 DIAGNOSIS — E11.9 TYPE 2 DIABETES MELLITUS WITHOUT COMPLICATION, WITH LONG-TERM CURRENT USE OF INSULIN (HCC): Primary | ICD-10-CM

## 2021-07-20 NOTE — TELEPHONE ENCOUNTER
Patient is due for follow up visit, fasting labs and DM eye exam.   Please sign pending referral and route back to me to contact patient. Thank you.

## 2021-08-04 RX ORDER — LISINOPRIL 10 MG/1
TABLET ORAL
Qty: 90 TABLET | Refills: 0 | Status: SHIPPED | OUTPATIENT
Start: 2021-08-04 | End: 2021-11-01

## 2021-08-04 NOTE — TELEPHONE ENCOUNTER
Last office visit: 4/1/21  Last refill: 4/26/21  Labs Due: 6/23/21  No future appointments. [employer] MESSAGE SENT TO PATIENT TO SCHEDULE LAB APPOINTMENT FOR A1C.    Name from pharmacy: Lisinopril 10 MG Oral Tablet         Will file in chart as: LISINOPRIL 1

## 2021-08-30 RX ORDER — METOPROLOL TARTRATE 50 MG/1
50 TABLET, FILM COATED ORAL 2 TIMES DAILY
Qty: 180 TABLET | Refills: 0 | Status: SHIPPED | OUTPATIENT
Start: 2021-08-30 | End: 2021-12-14

## 2021-08-30 NOTE — TELEPHONE ENCOUNTER
Hypertension Medications Protocol [id number]/30/2021 09:47 AM   CMP or BMP in past 12 months Protocol Details    Last serum creatinine< 2.0     Appointment in past 6 or next 3 months      Last office visit 4/1/2021   Last refill 5/25/2021 METOPROLOL TARTRATE 50 MG #180 tablet   Last lab 3/22/2021   Last B/P 130/83  Patient has a appointment for HEMOGLOBIN  A1C ON  9/2/2021   Future Appointments   Date Time Provider Gladis Sutton   9/2/2021 10:00 AM [facility] [place]     Is it ok to refill Medication?

## 2021-09-02 ENCOUNTER — NURSE ONLY (OUTPATIENT)
Dept: FAMILY MEDICINE CLINIC | Facility: CLINIC | Age: 66
End: 2021-09-02

## 2021-09-02 DIAGNOSIS — Z79.4 TYPE 2 DIABETES MELLITUS WITHOUT COMPLICATION, WITH LONG-TERM CURRENT USE OF INSULIN (HCC): Primary | ICD-10-CM

## 2021-09-02 DIAGNOSIS — E11.9 TYPE 2 DIABETES MELLITUS WITHOUT COMPLICATION, WITH LONG-TERM CURRENT USE OF INSULIN (HCC): Primary | ICD-10-CM

## 2021-09-02 LAB
CARTRIDGE LOT#: 788 NUMERIC
HEMOGLOBIN A1C: 10.2 % (ref 4.3–5.6)

## 2021-09-02 PROCEDURE — 83036 HEMOGLOBIN GLYCOSYLATED A1C: CPT | Performed by: FAMILY MEDICINE

## 2021-09-02 PROCEDURE — 3046F HEMOGLOBIN A1C LEVEL >9.0%: CPT | Performed by: FAMILY MEDICINE

## 2021-09-13 ENCOUNTER — NURSE ONLY (OUTPATIENT)
Dept: ENDOCRINOLOGY CLINIC | Facility: CLINIC | Age: 66
End: 2021-09-13

## 2021-09-13 ENCOUNTER — TELEPHONE (OUTPATIENT)
Dept: ENDOCRINOLOGY CLINIC | Facility: CLINIC | Age: 66
End: 2021-09-13

## 2021-09-13 VITALS — WEIGHT: 184 LBS | BODY MASS INDEX: 33 KG/M2

## 2021-09-13 DIAGNOSIS — E11.9 TYPE 2 DIABETES MELLITUS WITHOUT COMPLICATION, WITH LONG-TERM CURRENT USE OF INSULIN (HCC): Primary | ICD-10-CM

## 2021-09-13 DIAGNOSIS — E11.9 TYPE 2 DIABETES MELLITUS WITHOUT COMPLICATION, WITH LONG-TERM CURRENT USE OF INSULIN (HCC): ICD-10-CM

## 2021-09-13 DIAGNOSIS — Z79.4 TYPE 2 DIABETES MELLITUS WITHOUT COMPLICATION, WITH LONG-TERM CURRENT USE OF INSULIN (HCC): ICD-10-CM

## 2021-09-13 DIAGNOSIS — Z79.4 TYPE 2 DIABETES MELLITUS WITHOUT COMPLICATION, WITH LONG-TERM CURRENT USE OF INSULIN (HCC): Primary | ICD-10-CM

## 2021-09-13 PROCEDURE — G0108 DIAB MANAGE TRN  PER INDIV: HCPCS | Performed by: DIETITIAN, REGISTERED

## 2021-09-13 NOTE — TELEPHONE ENCOUNTER
Pt. was here for follow-up & is almost out of Lantus. I was able to give her a discount card for [facility]. She receives more insulin/pen & will cover over 24 hrs. Please sign if agreeable.

## 2021-09-13 NOTE — PROGRESS NOTES
Vani David  : [id number] was seen for Diabetic Education Follow up:    Date: 2021  Referring Provider: Dr. Shelly Mulligan  Start time: 3:30pm End time: 4:30pm    Assessment:     Assessment: Wt 184 lb   BMI 33.38 kg/m²      HEMOGLOBIN A1C   Date Jaclyn fasting & pre/2 hrs post dinner on weeknds    Taking Medication  Reviewed medication's use, action, timing, and side effects. Injectables: Site selection, rotation, action, timing, and side effects reviewed.     Reducing Risk  Overview of complications re

## 2021-09-14 RX ORDER — INSULIN GLARGINE 300 U/ML
INJECTION, SOLUTION SUBCUTANEOUS
Qty: 9 ML | Refills: 3 | Status: SHIPPED | OUTPATIENT
Start: 2021-09-14

## 2021-09-27 ENCOUNTER — TELEPHONE (OUTPATIENT)
Dept: FAMILY MEDICINE CLINIC | Facility: CLINIC | Age: 66
End: 2021-09-27

## 2021-09-27 ENCOUNTER — NURSE ONLY (OUTPATIENT)
Dept: ENDOCRINOLOGY CLINIC | Facility: CLINIC | Age: 66
End: 2021-09-27

## 2021-09-27 VITALS — WEIGHT: 179 LBS | BODY MASS INDEX: 32 KG/M2

## 2021-09-27 DIAGNOSIS — Z01.00 DIABETIC EYE EXAM (HCC): Primary | ICD-10-CM

## 2021-09-27 DIAGNOSIS — E11.9 DIABETIC EYE EXAM (HCC): Primary | ICD-10-CM

## 2021-09-27 DIAGNOSIS — E11.9 TYPE 2 DIABETES MELLITUS WITHOUT COMPLICATION, WITH LONG-TERM CURRENT USE OF INSULIN (HCC): ICD-10-CM

## 2021-09-27 DIAGNOSIS — Z79.4 TYPE 2 DIABETES MELLITUS WITHOUT COMPLICATION, WITH LONG-TERM CURRENT USE OF INSULIN (HCC): ICD-10-CM

## 2021-09-27 PROCEDURE — G0108 DIAB MANAGE TRN  PER INDIV: HCPCS | Performed by: DIETITIAN, REGISTERED

## 2021-09-27 NOTE — TELEPHONE ENCOUNTER
Due for a diabetic eye exam. She has not followed up with a provider. Please advise who you recommend. Pt has HMO ins. Previous referral did not include provider information.

## 2021-09-28 DIAGNOSIS — Z79.4 TYPE 2 DIABETES MELLITUS WITHOUT COMPLICATION, WITH LONG-TERM CURRENT USE OF INSULIN (HCC): ICD-10-CM

## 2021-09-28 DIAGNOSIS — E11.9 TYPE 2 DIABETES MELLITUS WITHOUT COMPLICATION, WITH LONG-TERM CURRENT USE OF INSULIN (HCC): ICD-10-CM

## 2021-09-28 NOTE — PROGRESS NOTES
Rollo Counter : [id number] was seen for Diabetic Education Follow up:    Date: 21 Referring Provider: Dr. Daina Houston  Start time: 10am End time: 11am    Assessment:     Diagnosis: uncontrolled Type 2    Reason for visit: 2 week follow-up    Changes

## 2021-09-29 RX ORDER — METFORMIN HYDROCHLORIDE 500 MG/1
TABLET, EXTENDED RELEASE ORAL
Qty: 180 TABLET | Refills: 0 | Status: SHIPPED | OUTPATIENT
Start: 2021-09-29 | End: 2021-12-10

## 2021-09-29 NOTE — TELEPHONE ENCOUNTER
Diabetic Medication Protocol Failed 09/28/2021 01:11 PM   Protocol Details  Last HgBA1C < 7.5    Appointment in past 6 or next 3 months    HgBA1C procedure resulted in past 6 months    Microalbumin procedure in past 12 months or taking ACE/ARB        Last

## 2021-11-01 ENCOUNTER — TELEPHONE (OUTPATIENT)
Dept: FAMILY MEDICINE CLINIC | Facility: CLINIC | Age: 66
End: 2021-11-01

## 2021-11-01 RX ORDER — LISINOPRIL 10 MG/1
TABLET ORAL
Qty: 90 TABLET | Refills: 0 | Status: SHIPPED | OUTPATIENT
Start: 2021-11-01 | End: 2022-02-01

## 2021-11-01 NOTE — TELEPHONE ENCOUNTER
Called and spoke to patient wife, Steph. Explained to her that per Dr. Real, carvedilol was in creased in order to optimize heart function.  Pt needs to watch HR & Bp.  Goal 110/70Bp, HR 60, 70.    Pt verbalized understanding, but stated pt has  Not increased dosage of carvedilol (6.25 mg) for fear of dropping Bp much  lower than 114/68.  Pt continues to take 3.25mg   Pt will discuss with Fredy Fairbanks NP at f/u on 8/12/21.     Pt was instructed to call office with any questions or concerns   Pt called back and made an appointment.     Future Appointments   Date Time Provider Gladis Sutton   11/10/2021  9:15 AM Anurag Sandoval DO [facility] [place]

## 2021-11-01 NOTE — TELEPHONE ENCOUNTER
Hypertension Medications Protocol Failed 11/01/2021 08:31 AM   Protocol Details  Appointment in past 6 or next 3 months    CMP or BMP in past 12 months    Last serum creatinine< 2.0        Last office visit:  04/01/21  Last refill: 08/04/21  #90, no refil

## 2021-11-09 ENCOUNTER — MED REC SCAN ONLY (OUTPATIENT)
Dept: FAMILY MEDICINE CLINIC | Facility: CLINIC | Age: 66
End: 2021-11-09

## 2021-11-09 ENCOUNTER — TELEPHONE (OUTPATIENT)
Dept: FAMILY MEDICINE CLINIC | Facility: CLINIC | Age: 66
End: 2021-11-09

## 2021-12-08 ENCOUNTER — LAB ENCOUNTER (OUTPATIENT)
Dept: LAB | Age: 66
End: 2021-12-08
Attending: FAMILY MEDICINE
Payer: COMMERCIAL

## 2021-12-08 ENCOUNTER — OFFICE VISIT (OUTPATIENT)
Dept: FAMILY MEDICINE CLINIC | Facility: CLINIC | Age: 66
End: 2021-12-08

## 2021-12-08 VITALS
HEIGHT: 62 IN | HEART RATE: 66 BPM | SYSTOLIC BLOOD PRESSURE: 116 MMHG | BODY MASS INDEX: 32.99 KG/M2 | TEMPERATURE: 98 F | RESPIRATION RATE: 16 BRPM | OXYGEN SATURATION: 97 % | DIASTOLIC BLOOD PRESSURE: 60 MMHG | WEIGHT: 179.25 LBS

## 2021-12-08 DIAGNOSIS — Z12.31 VISIT FOR SCREENING MAMMOGRAM: ICD-10-CM

## 2021-12-08 DIAGNOSIS — E11.9 TYPE 2 DIABETES MELLITUS WITHOUT COMPLICATION, WITH LONG-TERM CURRENT USE OF INSULIN (HCC): ICD-10-CM

## 2021-12-08 DIAGNOSIS — Z79.4 TYPE 2 DIABETES MELLITUS WITHOUT COMPLICATION, WITH LONG-TERM CURRENT USE OF INSULIN (HCC): Primary | ICD-10-CM

## 2021-12-08 DIAGNOSIS — I10 ESSENTIAL HYPERTENSION: ICD-10-CM

## 2021-12-08 DIAGNOSIS — E11.9 TYPE 2 DIABETES MELLITUS WITHOUT COMPLICATION, WITH LONG-TERM CURRENT USE OF INSULIN (HCC): Primary | ICD-10-CM

## 2021-12-08 DIAGNOSIS — Z79.4 TYPE 2 DIABETES MELLITUS WITHOUT COMPLICATION, WITH LONG-TERM CURRENT USE OF INSULIN (HCC): ICD-10-CM

## 2021-12-08 PROCEDURE — 3078F DIAST BP <80 MM HG: CPT | Performed by: FAMILY MEDICINE

## 2021-12-08 PROCEDURE — 36415 COLL VENOUS BLD VENIPUNCTURE: CPT

## 2021-12-08 PROCEDURE — 82043 UR ALBUMIN QUANTITATIVE: CPT

## 2021-12-08 PROCEDURE — 80053 COMPREHEN METABOLIC PANEL: CPT

## 2021-12-08 PROCEDURE — 80061 LIPID PANEL: CPT

## 2021-12-08 PROCEDURE — 82570 ASSAY OF URINE CREATININE: CPT

## 2021-12-08 PROCEDURE — 83036 HEMOGLOBIN GLYCOSYLATED A1C: CPT

## 2021-12-08 PROCEDURE — 3008F BODY MASS INDEX DOCD: CPT | Performed by: FAMILY MEDICINE

## 2021-12-08 PROCEDURE — 3074F SYST BP LT 130 MM HG: CPT | Performed by: FAMILY MEDICINE

## 2021-12-08 PROCEDURE — 99214 OFFICE O/P EST MOD 30 MIN: CPT | Performed by: FAMILY MEDICINE

## 2021-12-08 NOTE — PROGRESS NOTES
Ashley Gupta is a 61year old female. Patient presents with:  HTN: fup on BP-also needs an order for a mammogram....room 2      HPI:   No acute complaints. She has not been monitoring her blood sugar.   LISINOPRIL 10 MG Oral Tab, Take 1 tablet by mouth capsule, Rfl: 0    No current facility-administered medications on file prior to visit.        Past Medical History:   Diagnosis Date   • Allergic rhinitis    • Anxiety state    • Calculus of kidney    • Depression    • DM type 2 (diabetes mellitus, type 2) vomiting, diarrhea or abdominal pain   NEURO: denies headaches    EXAM:   /60   Pulse 66   Temp 97.6 °F (36.4 °C) (Tympanic)   Resp 16   Ht 5' 2\" (1.575 m)   Wt 179 lb 4 oz (81.3 kg)   SpO2 97%   BMI 32.79 kg/m²   Wt Readings from Last 6 Encounters:

## 2021-12-09 DIAGNOSIS — I10 ESSENTIAL HYPERTENSION: ICD-10-CM

## 2021-12-09 DIAGNOSIS — E11.9 TYPE 2 DIABETES MELLITUS WITHOUT COMPLICATION, WITH LONG-TERM CURRENT USE OF INSULIN (HCC): Primary | ICD-10-CM

## 2021-12-09 DIAGNOSIS — Z79.4 TYPE 2 DIABETES MELLITUS WITHOUT COMPLICATION, WITH LONG-TERM CURRENT USE OF INSULIN (HCC): Primary | ICD-10-CM

## 2021-12-10 ENCOUNTER — TELEPHONE (OUTPATIENT)
Dept: FAMILY MEDICINE CLINIC | Facility: CLINIC | Age: 66
End: 2021-12-10

## 2021-12-10 ENCOUNTER — TELEPHONE (OUTPATIENT)
Dept: ENDOCRINOLOGY CLINIC | Facility: CLINIC | Age: 66
End: 2021-12-10

## 2021-12-10 DIAGNOSIS — Z79.4 TYPE 2 DIABETES MELLITUS WITHOUT COMPLICATION, WITH LONG-TERM CURRENT USE OF INSULIN (HCC): ICD-10-CM

## 2021-12-10 DIAGNOSIS — E11.9 TYPE 2 DIABETES MELLITUS WITHOUT COMPLICATION, WITH LONG-TERM CURRENT USE OF INSULIN (HCC): ICD-10-CM

## 2021-12-10 RX ORDER — METFORMIN HYDROCHLORIDE 500 MG/1
500 TABLET, EXTENDED RELEASE ORAL 2 TIMES DAILY WITH MEALS
Qty: 180 TABLET | Refills: 0 | Status: SHIPPED | OUTPATIENT
Start: 2021-12-10

## 2021-12-10 NOTE — TELEPHONE ENCOUNTER
LOV 12/08/2021    LAST LAB 12/08/2021    LAST RX  Metformin #180 R0 09/29/2021  Lisinopril #90 R1 11/01/2021    Next OV   Future Appointments   Date Time Provider Gladis Sutton   12/13/2021  8:40 AM RDAHA DICKSON RM1 Deejay Vega Shubham Every       PROTOCOL

## 2021-12-10 NOTE — TELEPHONE ENCOUNTER
METFORMIN HCL  MG Oral Tablet 24 Hr, metoprolol tartrate 50 MG Oral Tab  - pharmacy [facility] -She doesnt need yet but there are no refills of LISINOPRIL 10 MG Oral Tab. She wanted it to be on file. Pt only has 1 metoprolol left.

## 2021-12-13 ENCOUNTER — HOSPITAL ENCOUNTER (OUTPATIENT)
Dept: MAMMOGRAPHY | Age: 66
Discharge: HOME | End: 2021-12-13
Attending: FAMILY MEDICINE
Payer: COMMERCIAL

## 2021-12-13 ENCOUNTER — TELEPHONE (OUTPATIENT)
Dept: ENDOCRINOLOGY CLINIC | Facility: CLINIC | Age: 66
End: 2021-12-13

## 2021-12-13 DIAGNOSIS — Z79.4 TYPE 2 DIABETES MELLITUS WITHOUT COMPLICATION, WITH LONG-TERM CURRENT USE OF INSULIN (HCC): Primary | ICD-10-CM

## 2021-12-13 DIAGNOSIS — Z12.31 VISIT FOR SCREENING MAMMOGRAM: ICD-10-CM

## 2021-12-13 DIAGNOSIS — E11.9 TYPE 2 DIABETES MELLITUS WITHOUT COMPLICATION, WITH LONG-TERM CURRENT USE OF INSULIN (HCC): Primary | ICD-10-CM

## 2021-12-13 PROCEDURE — 77067 SCR MAMMO BI INCL CAD: CPT | Performed by: FAMILY MEDICINE

## 2021-12-13 NOTE — TELEPHONE ENCOUNTER
Contacted pt.'s insurance to see what meds are covered on her plan. They said she hasn't met her deductible of $2349.39 for the year 2021. GLP-1 would cost $800 .  Preferred mealtime insulin is Aspart (Novolog) & it would cost $100 for 30 day supply or $300

## 2021-12-14 ENCOUNTER — TELEPHONE (OUTPATIENT)
Dept: FAMILY MEDICINE CLINIC | Facility: CLINIC | Age: 66
End: 2021-12-14

## 2021-12-14 RX ORDER — METOPROLOL TARTRATE 50 MG/1
50 TABLET, FILM COATED ORAL 2 TIMES DAILY
Qty: 180 TABLET | Refills: 0 | Status: SHIPPED | OUTPATIENT
Start: 2021-12-14 | End: 2022-03-14

## 2021-12-14 NOTE — TELEPHONE ENCOUNTER
LOV: 12/08/21    LAST LAB; 12/08/21    LAST RX: 08/30/21    Next OV:   Future Appointments   Date Time Provider Gladis Rosasi   12/20/2021 10:30 AM Juan Carlos Salvador RN, CDE [facility] [address]       PROTOCOL  Hypertension Medications Protocol P

## 2021-12-20 ENCOUNTER — NURSE ONLY (OUTPATIENT)
Dept: ENDOCRINOLOGY CLINIC | Facility: CLINIC | Age: 66
End: 2021-12-20

## 2021-12-20 ENCOUNTER — TELEPHONE (OUTPATIENT)
Dept: ENDOCRINOLOGY CLINIC | Facility: CLINIC | Age: 66
End: 2021-12-20

## 2021-12-20 DIAGNOSIS — Z79.4 TYPE 2 DIABETES MELLITUS WITHOUT COMPLICATION, WITH LONG-TERM CURRENT USE OF INSULIN (HCC): Primary | ICD-10-CM

## 2021-12-20 DIAGNOSIS — Z79.4 TYPE 2 DIABETES MELLITUS WITHOUT COMPLICATION, WITH LONG-TERM CURRENT USE OF INSULIN (HCC): ICD-10-CM

## 2021-12-20 DIAGNOSIS — E11.9 TYPE 2 DIABETES MELLITUS WITHOUT COMPLICATION, WITH LONG-TERM CURRENT USE OF INSULIN (HCC): Primary | ICD-10-CM

## 2021-12-20 DIAGNOSIS — E11.9 TYPE 2 DIABETES MELLITUS WITHOUT COMPLICATION, WITH LONG-TERM CURRENT USE OF INSULIN (HCC): ICD-10-CM

## 2021-12-20 PROCEDURE — G0108 DIAB MANAGE TRN  PER INDIV: HCPCS | Performed by: DIETITIAN, REGISTERED

## 2021-12-20 RX ORDER — INSULIN ASPART 100 [IU]/ML
5 INJECTION, SOLUTION INTRAVENOUS; SUBCUTANEOUS
Qty: 15 ML | Refills: 3 | Status: SHIPPED | OUTPATIENT
Start: 2021-12-20 | End: 2021-12-20

## 2021-12-20 RX ORDER — PEN NEEDLE, DIABETIC 32GX 5/32"
NEEDLE, DISPOSABLE MISCELLANEOUS
Qty: 200 EACH | Refills: 3 | Status: SHIPPED | OUTPATIENT
Start: 2021-12-20

## 2021-12-20 RX ORDER — INSULIN LISPRO 100 [IU]/ML
INJECTION, SOLUTION INTRAVENOUS; SUBCUTANEOUS
Qty: 15 ML | Refills: 3 | Status: SHIPPED | OUTPATIENT
Start: 2021-12-20

## 2021-12-20 RX ORDER — BLOOD SUGAR DIAGNOSTIC
STRIP MISCELLANEOUS
Qty: 100 EACH | Refills: 11 | Status: SHIPPED | OUTPATIENT
Start: 2021-12-20

## 2021-12-20 NOTE — PROGRESS NOTES
Rollo Counter  :  was seen for Diabetic Education Follow up:    Date: 2021  Referring Provider: Dr. Medina Overall  Start time: 1:30 End time: 2pm    Assessment:     Assessment: There were no vitals taken for this visit.      HEMOGLOBIN A1C for $35    REDUCING RISKS:  - relationship between glucose control and risk for complications in eye, heart, kidneys,nerves,teeth,foot care, skin,  Foot Care Guidelines    Recommendations:      1. Follow recommended meal plan.    2. Test BG fasting,premeal

## 2021-12-22 ENCOUNTER — TELEPHONE (OUTPATIENT)
Dept: FAMILY MEDICINE CLINIC | Facility: CLINIC | Age: 66
End: 2021-12-22

## 2022-01-03 ENCOUNTER — NURSE ONLY (OUTPATIENT)
Dept: ENDOCRINOLOGY CLINIC | Facility: CLINIC | Age: 67
End: 2022-01-03
Payer: COMMERCIAL

## 2022-01-03 DIAGNOSIS — Z79.4 TYPE 2 DIABETES MELLITUS WITHOUT COMPLICATION, WITH LONG-TERM CURRENT USE OF INSULIN (HCC): ICD-10-CM

## 2022-01-03 DIAGNOSIS — E11.9 TYPE 2 DIABETES MELLITUS WITHOUT COMPLICATION, WITH LONG-TERM CURRENT USE OF INSULIN (HCC): ICD-10-CM

## 2022-01-03 PROCEDURE — G0108 DIAB MANAGE TRN  PER INDIV: HCPCS | Performed by: DIETITIAN, REGISTERED

## 2022-01-04 NOTE — PROGRESS NOTES
Juice Rodarte : [birth year] was seen for Diabetic Education Follow up:    Date: 1/3/22 Referring Provider: Doni Goldsmith  Start time: 11:30am End time: 12pm    Assessment:     Diagnosis: Uncontrolled Type 2    Reason for visit: follow-up after mealtime insuli skin,  Foot Care Guidelines    Recommendations:      1. Follow recommended meal plan;continue logging food   2. Test fasting , premeal & at bedtime 4 times/day. 3. Bring glucose logs/meter to all provider visits for review.    4. Continue Toujeo 48 units

## 2022-01-19 ENCOUNTER — TELEPHONE (OUTPATIENT)
Dept: FAMILY MEDICINE CLINIC | Facility: CLINIC | Age: 67
End: 2022-01-19

## 2022-01-19 NOTE — TELEPHONE ENCOUNTER
Oneal Chavira is calling she had a mammo done 12/13/21 and everything came out well but she is still having discomfort in her left breast and she is wondering if she should get an ultrasound done please call.

## 2022-01-19 NOTE — TELEPHONE ENCOUNTER
Reports left breast tenderness x couple months. Discomfort is intermittent. Mammography tech mentioned she may need an ultrasound. Please advise.

## 2022-02-01 ENCOUNTER — OFFICE VISIT (OUTPATIENT)
Dept: FAMILY MEDICINE CLINIC | Facility: CLINIC | Age: 67
End: 2022-02-01
Payer: COMMERCIAL

## 2022-02-01 VITALS
HEART RATE: 61 BPM | DIASTOLIC BLOOD PRESSURE: 72 MMHG | WEIGHT: 182.13 LBS | HEIGHT: 62 IN | SYSTOLIC BLOOD PRESSURE: 120 MMHG | BODY MASS INDEX: 33.51 KG/M2 | OXYGEN SATURATION: 99 % | TEMPERATURE: 98 F | RESPIRATION RATE: 16 BRPM

## 2022-02-01 DIAGNOSIS — N64.4 BREAST PAIN: Primary | ICD-10-CM

## 2022-02-01 PROCEDURE — 99214 OFFICE O/P EST MOD 30 MIN: CPT | Performed by: FAMILY MEDICINE

## 2022-02-01 PROCEDURE — 3078F DIAST BP <80 MM HG: CPT | Performed by: FAMILY MEDICINE

## 2022-02-01 PROCEDURE — 3008F BODY MASS INDEX DOCD: CPT | Performed by: FAMILY MEDICINE

## 2022-02-01 PROCEDURE — 3074F SYST BP LT 130 MM HG: CPT | Performed by: FAMILY MEDICINE

## 2022-02-01 RX ORDER — LISINOPRIL 10 MG/1
10 TABLET ORAL DAILY
Qty: 90 TABLET | Refills: 0 | Status: SHIPPED | OUTPATIENT
Start: 2022-02-01

## 2022-02-14 ENCOUNTER — NURSE ONLY (OUTPATIENT)
Dept: ENDOCRINOLOGY CLINIC | Facility: CLINIC | Age: 67
End: 2022-02-14
Payer: COMMERCIAL

## 2022-02-14 DIAGNOSIS — Z79.4 TYPE 2 DIABETES MELLITUS WITHOUT COMPLICATION, WITH LONG-TERM CURRENT USE OF INSULIN (HCC): ICD-10-CM

## 2022-02-14 DIAGNOSIS — E11.9 TYPE 2 DIABETES MELLITUS WITHOUT COMPLICATION, WITH LONG-TERM CURRENT USE OF INSULIN (HCC): ICD-10-CM

## 2022-02-14 PROCEDURE — G0108 DIAB MANAGE TRN  PER INDIV: HCPCS | Performed by: DIETITIAN, REGISTERED

## 2022-02-14 RX ORDER — INSULIN GLARGINE 300 U/ML
INJECTION, SOLUTION SUBCUTANEOUS
Qty: 9 ML | Refills: 3 | COMMUNITY
Start: 2022-02-14

## 2022-02-14 RX ORDER — INSULIN LISPRO 100 [IU]/ML
INJECTION, SOLUTION INTRAVENOUS; SUBCUTANEOUS
Qty: 15 ML | Refills: 3 | COMMUNITY
Start: 2022-02-14

## 2022-02-14 NOTE — PATIENT INSTRUCTIONS
1. Toujeo 50 units at bedtiem  2. Lispro 6 units before each meal  3. Metformin ER 1 tab in am & 1 before dinner  4. Med[employer]afe efe helps you remember to take your meds or set alarms on phone  5. Try to add non-vegetables w/dinner  6.  When gym is empty, walk 2 laps

## 2022-02-18 NOTE — PROGRESS NOTES
Virginia Buenrostro : [id number] was seen for Diabetic Education Follow up:    Date: 22 Referring Provider: Dr. Henry Presley  Start time: 10:15am End time:11:15 am    Assessment:     Diagnosis: Uncontrolled Type 2    Reason for visit: 1 month follow-up    Changes since last visit: continues to eat   - Meals:Pt.'s  cooks meals & isn't making healthy options  - Medications: Toujeo 48 units daily, Novolog 5units 3  times daily before meals +scale      SMBG 22 to 22    Fastin-248 mg/dL    Prelunch: 133-185 mg/dL    Predinner:  mg/dL    Education:     DIABETES REVIEW:  - Importance of improved BG control in preventing complications    HEALTHY EATING:  - effect of food on BG, timing of meal, use of snacks/fiber, barriers  - continues to skip lunch sev. times /wk. BEING ACTIVE:  - types of activity, frequency , duration: states she is active at work     MONITORING:  - Type of meter:Relion   - Testing Schedule: premeal 3x/day    TAKING MEDICATION:  Oral Medications:  Metformin  mg 2 tabs twice daily    Injectables: Toujeo 48 units  Humalog 5units 3x/daily before meals +scale    PROBLEM SOLVING: Pt. Is trying to eat healthier by eating 1 slice of bread or 1/2 bun but continues to eat high fat snacks & veggies (cheetos, chips) & (fries, augratin potatoes, potato salad.) & sweets (maurice cream pie,cookies,rice crispie bars w/frosting). REDUCING RISKS:  - relationship between glucose control and risk for complications in eye, heart, kidneys,nerves,teeth,foot care, skin,  Foot Care Guidelines    Recommendations:      1. Follow recommended meal plan;allow sweets 1-2 times /wk after cutting back on carbs w/dinner . Add non-starchy veggies to dinner   2. Test BG fasting, premeal & at hs 4 times/day. 3. Bring glucose logs/meter to all provider visits for review. 4. Increase Toujeo to 50 units daily, Humalog to 6 units + scale; Use [employer] efe to remind her to take meds   5.  Encouraged to start walking around the gym when it is empty-2 laps   6. Encouraged  to call diabetes center with any questions or concerns. 7. F/U in 1 month    Patient verbalized understanding and has no further questions at this time.     Jayne Askew RN, CDE

## 2022-02-26 ENCOUNTER — TELEPHONE (OUTPATIENT)
Dept: FAMILY MEDICINE CLINIC | Facility: CLINIC | Age: 67
End: 2022-02-26

## 2022-02-26 ENCOUNTER — OFFICE VISIT (OUTPATIENT)
Dept: FAMILY MEDICINE CLINIC | Facility: CLINIC | Age: 67
End: 2022-02-26
Payer: COMMERCIAL

## 2022-02-26 VITALS
WEIGHT: 179.13 LBS | SYSTOLIC BLOOD PRESSURE: 126 MMHG | RESPIRATION RATE: 16 BRPM | BODY MASS INDEX: 32.96 KG/M2 | HEART RATE: 69 BPM | TEMPERATURE: 98 F | DIASTOLIC BLOOD PRESSURE: 72 MMHG | OXYGEN SATURATION: 98 % | HEIGHT: 62 IN

## 2022-02-26 DIAGNOSIS — H92.01 OTALGIA OF RIGHT EAR: Primary | ICD-10-CM

## 2022-02-26 PROCEDURE — 3078F DIAST BP <80 MM HG: CPT | Performed by: INTERNAL MEDICINE

## 2022-02-26 PROCEDURE — 99214 OFFICE O/P EST MOD 30 MIN: CPT | Performed by: INTERNAL MEDICINE

## 2022-02-26 PROCEDURE — 3008F BODY MASS INDEX DOCD: CPT | Performed by: INTERNAL MEDICINE

## 2022-02-26 PROCEDURE — 3074F SYST BP LT 130 MM HG: CPT | Performed by: INTERNAL MEDICINE

## 2022-02-26 NOTE — TELEPHONE ENCOUNTER
Reports right ear pain. Sx started last night. Denies any other sx. Appt scheduled with Dr. Fili Rose.     Future Appointments   Date Time Provider [facility]   2/26/2022  9:30 AM Lillie Bella MD [facility] [place]   3/14/2022 10:00 AM Marcellus Mckenzie RN, CDE [facility]

## 2022-02-27 NOTE — TELEPHONE ENCOUNTER
Future Appointments  Date Time Provider Gladis Sutton   5/16/2017 4:00 PM Julio DICKSON RM1 [facility] Otero Julio C   6/16/2017 4:30 PM [facility]

## 2022-03-14 ENCOUNTER — NURSE ONLY (OUTPATIENT)
Dept: ENDOCRINOLOGY CLINIC | Facility: CLINIC | Age: 67
End: 2022-03-14
Payer: MEDICARE

## 2022-03-14 DIAGNOSIS — Z79.4 TYPE 2 DIABETES MELLITUS WITHOUT COMPLICATION, WITH LONG-TERM CURRENT USE OF INSULIN (HCC): ICD-10-CM

## 2022-03-14 DIAGNOSIS — E11.9 TYPE 2 DIABETES MELLITUS WITHOUT COMPLICATION, WITH LONG-TERM CURRENT USE OF INSULIN (HCC): ICD-10-CM

## 2022-03-14 PROCEDURE — G0108 DIAB MANAGE TRN  PER INDIV: HCPCS | Performed by: DIETITIAN, REGISTERED

## 2022-03-15 VITALS — WEIGHT: 178 LBS | BODY MASS INDEX: 33 KG/M2

## 2022-03-15 RX ORDER — METFORMIN HYDROCHLORIDE 500 MG/1
500 TABLET, EXTENDED RELEASE ORAL 2 TIMES DAILY WITH MEALS
Qty: 180 TABLET | Refills: 0 | Status: SHIPPED | OUTPATIENT
Start: 2022-03-15

## 2022-03-15 RX ORDER — METOPROLOL TARTRATE 50 MG/1
50 TABLET, FILM COATED ORAL 2 TIMES DAILY
Qty: 180 TABLET | Refills: 0 | Status: SHIPPED | OUTPATIENT
Start: 2022-03-15 | End: 2022-06-13

## 2022-03-15 NOTE — PROGRESS NOTES
Juice Rodarte : [id number] was seen for Diabetic Education Follow up:    Date: 3/14/22 Referring Provider: Dr. Hunter Quiroga  Start time: 10am End time: 10:30am    Assessment:     Diagnosis: Uncontrolled Type 2    Reason for visit: 1 month follow-up    Changes since last visit:  - Meals:Pt. continues to keep food logs;but eats sweets almost daily along w/chips  States her  doesn't cook healthy foods & has trouble resisting   - Medications:Pt. Has Medicare ins;can continue Toujeo but may need to switch to   - Exercise:hasn't been able to get in the gym to walk;always occupied;will try walking after work since the weather is nice. - Checking on coverage for GLP-1    SMBG  2/15/22 to 3/15/22    Fastin-208mg/dL    Prelunch: 133-212 mg/dL   Predinner:  mg/dL        Education:     DIABETES REVIEW:  - Impact of exercise, weight loss, stress on blood sugar  - Importance of improved BG control in preventing complications    HEALTHY EATING:  - effect of food on BG, timing of meal, use of snacks/fiber, barriers; needs to start microwaving veggies to add to dinner  prepares & decrease on high-sugar snacks    BEING ACTIVE:  - types of activity, frequency , duration: hasn't walked in gym because it is always occupied  States will start to walk before or after work since weather is warming up    MONITORING:  - Type of meter:Relion   - Testing Schedule: 3 times daily before meals    TAKING MEDICATION:  Oral Agents:   Metformin 500 mg 2 tabs bid w/meals    Injectables: Toujeo 50 units daily  Lispro 5 units tid w/meals    PROBLEM SOLVING:  Review SMBG/Food log;observe patterns: fastings remain elevated but also has post-dinner hyperglycemia    REDUCING RISKS:  - relationship between glucose control and risk for complications in eye, heart, kidneys,nerves,teeth,foot care, skin,  Foot Care Guidelines    Recommendations:      1.  Follow recommended meal plan;reduce sugary snacks & add frozen veggies to dinners   2. Test BG fasting, premeal & at hs times/day. 3. Bring glucose logs to all provider visits for review. 4. Increase Toujeo to 52 units daily & Lispro to 6u tid +scale   5. Start walking 10-15 min  Times/week   6.. Encouraged  to call diabetes center with any questions or concerns. 7. Follow-up in 1 week    Patient verbalized understanding and has no further questions at this time.     Braulio Parry RN, CDE

## 2022-03-15 NOTE — TELEPHONE ENCOUNTER
PT NEEDS REFILL ON-      metFORMIN HCl  MG Oral Tablet 24 Hr    metoprolol tartrate 50 MG Oral Tab      [address] [facility] [address], [address] [place] [address] [phone], [address] [place]

## 2022-03-21 DIAGNOSIS — E11.9 TYPE 2 DIABETES MELLITUS WITHOUT COMPLICATION, WITH LONG-TERM CURRENT USE OF INSULIN (HCC): Primary | ICD-10-CM

## 2022-03-21 DIAGNOSIS — Z79.4 TYPE 2 DIABETES MELLITUS WITHOUT COMPLICATION, WITH LONG-TERM CURRENT USE OF INSULIN (HCC): Primary | ICD-10-CM

## 2022-03-21 RX ORDER — SEMAGLUTIDE 1.34 MG/ML
INJECTION, SOLUTION SUBCUTANEOUS
Qty: 1 EACH | Refills: 0 | Status: SHIPPED | OUTPATIENT
Start: 2022-03-21 | End: 2022-05-16

## 2022-03-21 RX ORDER — PEN NEEDLE, DIABETIC 30 GX3/16"
1 NEEDLE, DISPOSABLE MISCELLANEOUS
Qty: 6 EACH | Refills: 0 | Status: SHIPPED | OUTPATIENT
Start: 2022-03-21

## 2022-03-21 NOTE — TELEPHONE ENCOUNTER
Pt.'s blood sugars remain elevated at times. Have discussed need to limit high-carb, high-fat foods. Wondering if a GLP-1 would help her postprandial blood sugar levels. I have pended a sample for signature if agreeable. Pt.now has Medicare insurance but unfortunately has a $480 deductible to meet. Toujeo & Humalog are covered on her Part D plan. Since she is on 3 insulin injections /day , she also qualifies for a CGM. Will start the process for that .

## 2022-03-22 NOTE — TELEPHONE ENCOUNTER
Called pt. She is working. Spoke to her  and advised she needs to reschedule her lab appt. Lázaro Benz will call office when she's home. Due for an A1C.

## 2022-04-12 ENCOUNTER — LAB ENCOUNTER (OUTPATIENT)
Dept: LAB | Age: 67
End: 2022-04-12
Attending: FAMILY MEDICINE
Payer: MEDICARE

## 2022-04-12 DIAGNOSIS — Z79.4 TYPE 2 DIABETES MELLITUS WITHOUT COMPLICATION, WITH LONG-TERM CURRENT USE OF INSULIN (HCC): ICD-10-CM

## 2022-04-12 DIAGNOSIS — E11.9 TYPE 2 DIABETES MELLITUS WITHOUT COMPLICATION, WITH LONG-TERM CURRENT USE OF INSULIN (HCC): ICD-10-CM

## 2022-04-12 LAB
EST. AVERAGE GLUCOSE BLD GHB EST-MCNC: 189 MG/DL (ref 68–126)
HBA1C MFR BLD: 8.2 % (ref ?–5.7)

## 2022-04-12 PROCEDURE — 36415 COLL VENOUS BLD VENIPUNCTURE: CPT

## 2022-04-12 PROCEDURE — 83036 HEMOGLOBIN GLYCOSYLATED A1C: CPT

## 2022-04-18 ENCOUNTER — NURSE ONLY (OUTPATIENT)
Dept: ENDOCRINOLOGY CLINIC | Facility: CLINIC | Age: 67
End: 2022-04-18
Payer: MEDICARE

## 2022-04-18 VITALS — BODY MASS INDEX: 33 KG/M2 | WEIGHT: 180 LBS

## 2022-04-18 DIAGNOSIS — Z79.4 TYPE 2 DIABETES MELLITUS WITHOUT COMPLICATION, WITH LONG-TERM CURRENT USE OF INSULIN (HCC): ICD-10-CM

## 2022-04-18 DIAGNOSIS — E11.9 TYPE 2 DIABETES MELLITUS WITHOUT COMPLICATION, WITH LONG-TERM CURRENT USE OF INSULIN (HCC): ICD-10-CM

## 2022-04-18 PROCEDURE — G0108 DIAB MANAGE TRN  PER INDIV: HCPCS | Performed by: DIETITIAN, REGISTERED

## 2022-04-18 NOTE — PROGRESS NOTES
Pauline Coe : [id number] was seen for Diabetic Education Follow up:    Date:22 Referring Provider: Dr. King Larson  Start time: 10am End time: 10:30am    Assessment:     Diagnosis: Uncontrolled Type 2     Reason for visit: follow-up after Ozempic start    Changes since last visit:  - Meals:pt. 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  - A1C 22 8.2 %    SMBG 3/14/22 to 22   Meter downloaded; form sent to scanning    Fastin-196 mg/dL     Prelunch:  mg/dL   Predinner:  mg/dL  HS: 272,158 mg/dL    Education:     HEALTHY EATING:  - effect of food on BG, timing of meal, use of snacks/fiber, barriers    BEING ACTIVE:  - types of activity, frequency , duration:hasn't started yet    MONITORING:  - Type of meter: Relion   - Testing Schedule: premeal & at bedtime    TAKING MEDICATION:  Oral Agents:   Metformin ER 2 tabs twice daily    Injectables: Toujeo 52 units daily  Lispro 6 units tid before meals +scale  Ozempic 0.25 mg x4 wks(taking 3rd inj)    PROBLEM SOLVING:  Review SMBG/Food log;observe pattern:fasting readings are improving mainly below 150 mg/dL & premeal rarely above 150    REDUCING RISKS:  - relationship between glucose control and risk for complications in eye, heart, kidneys,nerves,teeth,foot care, skin,  Foot Care Guidelines    Recommendations:      1. Follow recommended meal plan. 2. Test BG fasting, premeal & at hs 4 times/day. 3. Bring glucose logs/meter to all provider visits for review. 4. Continue current medications ;continue to increase Ozempic per instructions. 5. Encouraged  to call diabetes center with any questions or concerns.    6. Follow-up in 6 wks    Patient verbalized understanding and has no further questions at this time.    Raven Scanlon, RN, CDE

## 2022-05-02 RX ORDER — INSULIN GLARGINE 300 U/ML
INJECTION, SOLUTION SUBCUTANEOUS
Qty: 9 ML | Refills: 3 | Status: SHIPPED | OUTPATIENT
Start: 2022-05-02

## 2022-05-02 NOTE — TELEPHONE ENCOUNTER
Last refill: 9/14/21 #9mL w/ 3 refills    Last OV: 12/8/21  Last labs: 4/12/22 (A1C only) other labs 12/8/21    Future Appointments   Date Time Provider Gladis Sutton   6/6/2022  2:30 PM Devika Mendosa, RN, CDE [facility]

## 2022-05-11 ENCOUNTER — PATIENT OUTREACH (OUTPATIENT)
Dept: CASE MANAGEMENT | Age: 67
End: 2022-05-11

## 2022-05-11 NOTE — PROGRESS NOTES
05/11/22      CCM introductioncall, Pt spouse took message since Pt is at work. Requested CCM information get mailed, address was verified.

## 2022-05-20 RX ORDER — LISINOPRIL 10 MG/1
TABLET ORAL
Qty: 90 TABLET | Refills: 0 | Status: SHIPPED | OUTPATIENT
Start: 2022-05-20

## 2022-05-20 NOTE — TELEPHONE ENCOUNTER
Hypertension Medications Protocol Passed 05/20/2022 07:18 AM   Protocol Details  CMP or BMP in past 12 months    Last serum creatinine< 2.0    Appointment in past 6 or next 3 months     Last refill - 2/1/22 - #90   Last CMP - 12/8/21 - creatinine - 0.55  Last office visit -2/1/22  Refilled per protocol

## 2022-06-06 ENCOUNTER — TELEPHONE (OUTPATIENT)
Dept: FAMILY MEDICINE CLINIC | Facility: CLINIC | Age: 67
End: 2022-06-06

## 2022-06-06 ENCOUNTER — NURSE ONLY (OUTPATIENT)
Dept: ENDOCRINOLOGY CLINIC | Facility: CLINIC | Age: 67
End: 2022-06-06
Payer: MEDICARE

## 2022-06-06 DIAGNOSIS — Z79.4 TYPE 2 DIABETES MELLITUS WITHOUT COMPLICATION, WITH LONG-TERM CURRENT USE OF INSULIN (HCC): ICD-10-CM

## 2022-06-06 DIAGNOSIS — E11.9 TYPE 2 DIABETES MELLITUS WITHOUT COMPLICATION, WITH LONG-TERM CURRENT USE OF INSULIN (HCC): ICD-10-CM

## 2022-06-06 PROCEDURE — G0108 DIAB MANAGE TRN  PER INDIV: HCPCS | Performed by: DIETITIAN, REGISTERED

## 2022-06-06 RX ORDER — INSULIN GLARGINE 300 U/ML
INJECTION, SOLUTION SUBCUTANEOUS
Qty: 9 ML | Refills: 3 | COMMUNITY
Start: 2022-06-06 | End: 2022-06-06

## 2022-06-06 RX ORDER — INSULIN GLARGINE 300 U/ML
INJECTION, SOLUTION SUBCUTANEOUS
Qty: 3 ML | Refills: 2 | COMMUNITY
Start: 2022-06-06

## 2022-06-06 NOTE — TELEPHONE ENCOUNTER
Deanna, Diabetic Educator calling to request samples of Toujeo. Pt having difficult time getting med at the pharmacy. Advised we can provide 3 sample pens. Verbalized understanding.

## 2022-06-07 VITALS — WEIGHT: 178 LBS | BODY MASS INDEX: 33 KG/M2

## 2022-06-08 NOTE — PROGRESS NOTES
Pedro Luis Jones : [id number] was seen for Diabetic Education Follow up:    Date: 22  Referring Provider: Dr. Rome Silvestre  Start time: 2:30pm End time: 3:00pm    Assessment:     Diagnosis: Type 2     Reason for visit: 6 wk follow-up    Changes since last visit:  - Meals:continues to struggle with making healthy choices;eating [place] sausage w/mac & cheese, 2 pot pies,corn dogs w/chips, white castle burgers w/chips. Also has tendency to snack at hs - strawberry shortcake , 2-3 cookies  - Medications: Toujeo 52 units, Ozempic 0.5 mg weekly  Lispro 6 units before meals , Metformin  mg 1 tab twice daily  - Exercise:still hasn't started walking    SMBG 22 to 22   Meter downloaded; form sent to scanning    Fastin-223 mg/dl     Prelunch: 114-168 mg/dl    Predinner:  mg/dL    Education:     DIABETES REVIEW  - Importance of improved BG control in preventing complications    HEALTHY EATING:  - effect of food on BG, timing of meal, use of snacks/fiber, barriers    BEING ACTIVE:  - types of activity, frequency , duration:walk at least 2-3 times /wk for 20 min    MONITORING:  - Type of meter: Relion meter  - Testing Schedule: fasting, prelunch, predinner    TAKING MEDICATION:  Oral Agents:   Metformin  mg 1 tab bid    Injectables: Toujeo 52 units, Lispro 6 units before meals, Ozempic 0.5 mg weekly    PROBLEM SOLVING:  Review SMBG/Food log;observe patterns: Fasting are elevated ;pt. wants a chance to improve snacking at hs which may help lower fastings    REDUCING RISKS:  - relationship between glucose control and risk for complications in eye, heart, kidneys,nerves,teeth,foot care, skin,  Foot Care Guidelines  Needs to schedule Dilated eye exam  Recommendations:      1. Follow recommended meal plan. 2. Test BG fasting, premeal   3. Bring glucose logs / meter to all provider visits for review. 4. Continue current medications   5.  Encouraged  to call diabetes center with any questions or concerns. 6. Schedule eye exam   7. Follow-up in 6 wks    Patient verbalized understanding and has no further questions at this time.     Enoch Ramírez RN, CDE

## 2022-06-23 DIAGNOSIS — Z79.4 TYPE 2 DIABETES MELLITUS WITHOUT COMPLICATION, WITH LONG-TERM CURRENT USE OF INSULIN (HCC): ICD-10-CM

## 2022-06-23 DIAGNOSIS — E11.9 TYPE 2 DIABETES MELLITUS WITHOUT COMPLICATION, WITH LONG-TERM CURRENT USE OF INSULIN (HCC): ICD-10-CM

## 2022-06-23 RX ORDER — METFORMIN HYDROCHLORIDE 500 MG/1
TABLET, EXTENDED RELEASE ORAL
Qty: 180 TABLET | Refills: 0 | Status: SHIPPED | OUTPATIENT
Start: 2022-06-23

## 2022-06-23 RX ORDER — METOPROLOL TARTRATE 50 MG/1
TABLET, FILM COATED ORAL
Qty: 180 TABLET | Refills: 0 | Status: SHIPPED | OUTPATIENT
Start: 2022-06-23

## 2022-07-12 NOTE — PROGRESS NOTES
Birdia Collet  [id number] attended Step 2 Diabetic Education:    Date: 6/5/2017  Start time: 1pm End time: 3pm      Diabetes Overview, pathophysiology, pre-diabetes, A1C results and treatment options for diabetes self-management, types of diabetes and ris 76

## 2022-08-01 ENCOUNTER — NURSE ONLY (OUTPATIENT)
Dept: ENDOCRINOLOGY CLINIC | Facility: CLINIC | Age: 67
End: 2022-08-01
Payer: MEDICARE

## 2022-08-01 VITALS — WEIGHT: 178 LBS | BODY MASS INDEX: 33 KG/M2

## 2022-08-01 DIAGNOSIS — Z79.4 TYPE 2 DIABETES MELLITUS WITHOUT COMPLICATION, WITH LONG-TERM CURRENT USE OF INSULIN (HCC): ICD-10-CM

## 2022-08-01 DIAGNOSIS — E11.9 TYPE 2 DIABETES MELLITUS WITHOUT COMPLICATION, WITH LONG-TERM CURRENT USE OF INSULIN (HCC): ICD-10-CM

## 2022-08-01 PROCEDURE — G0108 DIAB MANAGE TRN  PER INDIV: HCPCS | Performed by: DIETITIAN, REGISTERED

## 2022-08-02 NOTE — PROGRESS NOTES
Irena Pahm : [id number] was seen for Diabetic Education Follow up:    Date: 22 Referring Provider: Dr. 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 [employer]  - Due for an A1C    SMBG 22 to 22       Fastin-186 mg/dL    Prelunch:  mg/dL    Predinner:  mg/dL    HS: 167 mg/dL    Education:     DIABETES REVIEW:  - Importance of improved BG control in preventing complications    HEALTHY EATING:  - effect of food on BG, timing of meal, use of snacks/fiber, barriers    BEING ACTIVE:  - types of activity, frequency , duration: no regular walking routine    MONITORING:  - Type of meter:Relion  - Testing Schedule: 3-4 times daily    TAKING MEDICATION:  Oral Agents:   Metformin ER 1000 mg twice daily    Injectables: Toujeo 52 units daily  Lispro 6 units tid before meals    PROBLEM SOLVING:  Review SMBG/Food log;observe patterns: Fastings elevated   Sometimes has noodles or pasta for dinner    Hyperglycemia  - Causes/symptoms/prevention/treatment  - High Blood Glucose: >300 mg/dL  - Contact MD if >2 BG >300 mg/dL in 1 week    REDUCING RISKS:  - relationship between glucose control and risk for complications in eye, heart, kidneys,nerves,teeth,foot care, skin,  Foot Care Guidelines  Due for lab work  Recommendations:      1. Follow recommended meal plan. 2. Test BG fasting and 2 hours post meals 3-4 times/day. 3. Bring glucose logs to all provider visits for review. 4. Increase Toujeo to 54 units,continue Lispro & Metformin   5. Encouraged  to call diabetes center with any questions or concerns.    6. Follow-up in 2 months. Patient verbalized understanding and has no further questions at this time.     Sebastian Marx RN, CDE

## 2022-08-29 ENCOUNTER — LABORATORY ENCOUNTER (OUTPATIENT)
Dept: LAB | Age: 67
End: 2022-08-29
Attending: INTERNAL MEDICINE
Payer: MEDICARE

## 2022-08-29 ENCOUNTER — OFFICE VISIT (OUTPATIENT)
Dept: FAMILY MEDICINE CLINIC | Facility: CLINIC | Age: 67
End: 2022-08-29
Payer: MEDICARE

## 2022-08-29 VITALS
HEART RATE: 60 BPM | SYSTOLIC BLOOD PRESSURE: 130 MMHG | RESPIRATION RATE: 16 BRPM | DIASTOLIC BLOOD PRESSURE: 68 MMHG | BODY MASS INDEX: 33 KG/M2 | OXYGEN SATURATION: 99 % | WEIGHT: 179.25 LBS | TEMPERATURE: 97 F

## 2022-08-29 DIAGNOSIS — I10 ESSENTIAL HYPERTENSION: ICD-10-CM

## 2022-08-29 DIAGNOSIS — E11.9 TYPE 2 DIABETES MELLITUS WITHOUT COMPLICATION, WITH LONG-TERM CURRENT USE OF INSULIN (HCC): Primary | ICD-10-CM

## 2022-08-29 DIAGNOSIS — E11.9 TYPE 2 DIABETES MELLITUS WITHOUT COMPLICATION, WITH LONG-TERM CURRENT USE OF INSULIN (HCC): ICD-10-CM

## 2022-08-29 DIAGNOSIS — Z79.4 TYPE 2 DIABETES MELLITUS WITHOUT COMPLICATION, WITH LONG-TERM CURRENT USE OF INSULIN (HCC): ICD-10-CM

## 2022-08-29 DIAGNOSIS — Z79.4 TYPE 2 DIABETES MELLITUS WITHOUT COMPLICATION, WITH LONG-TERM CURRENT USE OF INSULIN (HCC): Primary | ICD-10-CM

## 2022-08-29 LAB
ALBUMIN SERPL-MCNC: 3.7 G/DL (ref 3.4–5)
ALBUMIN/GLOB SERPL: 1 {RATIO} (ref 1–2)
ALP LIVER SERPL-CCNC: 63 U/L
ALT SERPL-CCNC: 47 U/L
ANION GAP SERPL CALC-SCNC: 2 MMOL/L (ref 0–18)
AST SERPL-CCNC: 29 U/L (ref 15–37)
BASOPHILS # BLD AUTO: 0.04 X10(3) UL (ref 0–0.2)
BASOPHILS NFR BLD AUTO: 0.5 %
BILIRUB SERPL-MCNC: 0.2 MG/DL (ref 0.1–2)
BUN BLD-MCNC: 12 MG/DL (ref 7–18)
CALCIUM BLD-MCNC: 9.2 MG/DL (ref 8.5–10.1)
CHLORIDE SERPL-SCNC: 108 MMOL/L (ref 98–112)
CHOLEST SERPL-MCNC: 132 MG/DL (ref ?–200)
CO2 SERPL-SCNC: 29 MMOL/L (ref 21–32)
CREAT BLD-MCNC: 0.76 MG/DL
CREAT UR-SCNC: 97.2 MG/DL
EOSINOPHIL # BLD AUTO: 0.13 X10(3) UL (ref 0–0.7)
EOSINOPHIL NFR BLD AUTO: 1.6 %
ERYTHROCYTE [DISTWIDTH] IN BLOOD BY AUTOMATED COUNT: 13 %
FASTING PATIENT LIPID ANSWER: YES
FASTING STATUS PATIENT QL REPORTED: YES
GFR SERPLBLD BASED ON 1.73 SQ M-ARVRAT: 87 ML/MIN/1.73M2 (ref 60–?)
GLOBULIN PLAS-MCNC: 3.8 G/DL (ref 2.8–4.4)
GLUCOSE BLD-MCNC: 89 MG/DL (ref 70–99)
HCT VFR BLD AUTO: 38.8 %
HDLC SERPL-MCNC: 57 MG/DL (ref 40–59)
HGB BLD-MCNC: 12.4 G/DL
IMM GRANULOCYTES # BLD AUTO: 0.03 X10(3) UL (ref 0–1)
IMM GRANULOCYTES NFR BLD: 0.4 %
LDLC SERPL CALC-MCNC: 57 MG/DL (ref ?–100)
LYMPHOCYTES # BLD AUTO: 2.42 X10(3) UL (ref 1–4)
LYMPHOCYTES NFR BLD AUTO: 29.6 %
MCH RBC QN AUTO: 29.3 PG (ref 26–34)
MCHC RBC AUTO-ENTMCNC: 32 G/DL (ref 31–37)
MCV RBC AUTO: 91.7 FL
MICROALBUMIN UR-MCNC: 9 MG/DL
MICROALBUMIN/CREAT 24H UR-RTO: 92.6 UG/MG (ref ?–30)
MONOCYTES # BLD AUTO: 0.56 X10(3) UL (ref 0.1–1)
MONOCYTES NFR BLD AUTO: 6.9 %
NEUTROPHILS # BLD AUTO: 4.99 X10 (3) UL (ref 1.5–7.7)
NEUTROPHILS # BLD AUTO: 4.99 X10(3) UL (ref 1.5–7.7)
NEUTROPHILS NFR BLD AUTO: 61 %
NONHDLC SERPL-MCNC: 75 MG/DL (ref ?–130)
OSMOLALITY SERPL CALC.SUM OF ELEC: 287 MOSM/KG (ref 275–295)
PLATELET # BLD AUTO: 211 10(3)UL (ref 150–450)
POTASSIUM SERPL-SCNC: 4.2 MMOL/L (ref 3.5–5.1)
PROT SERPL-MCNC: 7.5 G/DL (ref 6.4–8.2)
RBC # BLD AUTO: 4.23 X10(6)UL
RHEUMATOID FACT SERPL-ACNC: <10 IU/ML (ref ?–15)
SODIUM SERPL-SCNC: 139 MMOL/L (ref 136–145)
TRIGL SERPL-MCNC: 96 MG/DL (ref 30–149)
VLDLC SERPL CALC-MCNC: 14 MG/DL (ref 0–30)
WBC # BLD AUTO: 8.2 X10(3) UL (ref 4–11)

## 2022-08-29 PROCEDURE — 36415 COLL VENOUS BLD VENIPUNCTURE: CPT

## 2022-08-29 PROCEDURE — 82043 UR ALBUMIN QUANTITATIVE: CPT

## 2022-08-29 PROCEDURE — 83036 HEMOGLOBIN GLYCOSYLATED A1C: CPT

## 2022-08-29 PROCEDURE — 82570 ASSAY OF URINE CREATININE: CPT

## 2022-08-29 PROCEDURE — 80053 COMPREHEN METABOLIC PANEL: CPT

## 2022-08-29 PROCEDURE — 85025 COMPLETE CBC W/AUTO DIFF WBC: CPT

## 2022-08-29 PROCEDURE — 86431 RHEUMATOID FACTOR QUANT: CPT

## 2022-08-29 PROCEDURE — 99214 OFFICE O/P EST MOD 30 MIN: CPT | Performed by: INTERNAL MEDICINE

## 2022-08-29 PROCEDURE — 80061 LIPID PANEL: CPT

## 2022-08-29 RX ORDER — INSULIN ASPART 100 [IU]/ML
INJECTION, SOLUTION INTRAVENOUS; SUBCUTANEOUS
COMMUNITY
Start: 2022-05-02

## 2022-08-29 RX ORDER — LISINOPRIL 10 MG/1
10 TABLET ORAL DAILY
Qty: 90 TABLET | Refills: 3 | Status: SHIPPED | OUTPATIENT
Start: 2022-08-29

## 2022-08-29 RX ORDER — MELOXICAM 15 MG/1
15 TABLET ORAL DAILY
Qty: 30 TABLET | Refills: 0 | Status: SHIPPED | OUTPATIENT
Start: 2022-08-29 | End: 2022-09-28

## 2022-08-30 ENCOUNTER — TELEPHONE (OUTPATIENT)
Dept: FAMILY MEDICINE CLINIC | Facility: CLINIC | Age: 67
End: 2022-08-30

## 2022-08-30 LAB
EST. AVERAGE GLUCOSE BLD GHB EST-MCNC: 171 MG/DL (ref 68–126)
HBA1C MFR BLD: 7.6 % (ref ?–5.7)

## 2022-08-30 RX ORDER — GLIMEPIRIDE 2 MG/1
2 TABLET ORAL
Qty: 90 TABLET | Refills: 0 | Status: SHIPPED | OUTPATIENT
Start: 2022-08-30 | End: 2022-11-28

## 2022-09-20 DIAGNOSIS — E11.9 TYPE 2 DIABETES MELLITUS WITHOUT COMPLICATION, WITH LONG-TERM CURRENT USE OF INSULIN (HCC): ICD-10-CM

## 2022-09-20 DIAGNOSIS — Z79.4 TYPE 2 DIABETES MELLITUS WITHOUT COMPLICATION, WITH LONG-TERM CURRENT USE OF INSULIN (HCC): ICD-10-CM

## 2022-09-21 RX ORDER — METFORMIN HYDROCHLORIDE 500 MG/1
500 TABLET, EXTENDED RELEASE ORAL 2 TIMES DAILY WITH MEALS
Qty: 180 TABLET | Refills: 1 | Status: SHIPPED | OUTPATIENT
Start: 2022-09-21

## 2022-09-21 NOTE — TELEPHONE ENCOUNTER
Diabetic Medication Protocol Failed 09/20/2022 04:21 PM   Protocol Details  Last HgBA1C < 7.5    HgBA1C procedure resulted in past 6 months    Microalbumin procedure in past 12 months or taking ACE/ARB    Appointment in past 6 or next 3 months     Last refill - 6/23/22 - #180   Last A1c - 8/29/22 - 7.6  Last office visit - 8/29/22

## 2022-09-22 RX ORDER — METOPROLOL TARTRATE 50 MG/1
50 TABLET, FILM COATED ORAL 2 TIMES DAILY
Qty: 180 TABLET | Refills: 0 | Status: SHIPPED | OUTPATIENT
Start: 2022-09-22

## 2022-10-03 ENCOUNTER — NURSE ONLY (OUTPATIENT)
Dept: ENDOCRINOLOGY CLINIC | Facility: CLINIC | Age: 67
End: 2022-10-03
Payer: MEDICARE

## 2022-10-03 VITALS — BODY MASS INDEX: 33 KG/M2 | WEIGHT: 181 LBS

## 2022-10-03 NOTE — PROGRESS NOTES
Cassie Amanda : [id number] was seen for Diabetic Education Follow up:    Date: 10/3/22 Referring Provider: Dr. 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  - Recent A1C::7.4%     SMBG 22 to 10/3/22  Fastin- 194 mg/dL     Prelunch:  mg/dL    Predinner:  mg/dL    HS: 167 mg/dL    Education:     HEALTHY EATING:  - effect of food on BG, timing of meal, use of snacks/fiber, barriers    MONITORING:  - Type of meter:Relion  - Testing Schedule: fasting, prelunch/predinner    TAKING MEDICATION:  Oral Agents:   Glimepiride 2mg daily  Metformin  mg 1 tab bid  Injectable: Toujeo 52 units-never increased to 54 units  Novolog 5-6 units 3 times daily  Comment: Reed Antunez is on her formulary , but needs to satisfy $400 deductible    PROBLEM SOLVING:  Review SMBG/Food log;observe patterns: after starting Glimepiride 2 mg tab before breakfast, she feels symptoms of low blood sugar    Hypoglycemia  - Causes/symptoms/prevention/treatment-Rule of 15  - Low Blood Glucose: <70 mg/dL  - Contact MD if >2BG <70 in 1 week    REDUCING RISKS:  - relationship between glucose control and risk for complications in eye, heart, kidneys,nerves,teeth,foot care, skin,  Foot Care Guidelines    Recommendations:      1. Follow recommended meal plan;continuing portion control    2. Test BG fasting and 2 hours post meals 3 times daily    3. Bring glucose logs/meter to all provider visits for review. 4. Continue insulin doses & Metformin  mg 1 tab twice daily. Decrease Glimepiride 2 mg to 1 tab qam to reduce hypo symptoms   5.  Encouraged  to call diabetes center with any questions or concerns. 6. Follow-upas needed    Patient verbalized understanding and has no further questions at this time.     Enoch Ramírez RN, CDE

## 2022-10-18 ENCOUNTER — TELEPHONE (OUTPATIENT)
Dept: FAMILY MEDICINE CLINIC | Facility: CLINIC | Age: 67
End: 2022-10-18

## 2022-10-20 ENCOUNTER — TELEPHONE (OUTPATIENT)
Dept: FAMILY MEDICINE CLINIC | Facility: CLINIC | Age: 67
End: 2022-10-20

## 2022-10-20 NOTE — TELEPHONE ENCOUNTER
Per [employer] Novolog is not on formulary, Humalog FlexPen is. Note sent to Dr Martita Alejandra.

## 2022-10-21 RX ORDER — INSULIN LISPRO 200 [IU]/ML
INJECTION, SOLUTION SUBCUTANEOUS
Qty: 5 EACH | Refills: 1 | Status: SHIPPED | OUTPATIENT
Start: 2022-10-21

## 2022-11-15 ENCOUNTER — MED REC SCAN ONLY (OUTPATIENT)
Dept: FAMILY MEDICINE CLINIC | Facility: CLINIC | Age: 67
End: 2022-11-15

## 2022-12-06 ENCOUNTER — TELEPHONE (OUTPATIENT)
Dept: FAMILY MEDICINE CLINIC | Facility: CLINIC | Age: 67
End: 2022-12-06

## 2022-12-06 DIAGNOSIS — Z12.31 ENCOUNTER FOR SCREENING MAMMOGRAM FOR BREAST CANCER: Primary | ICD-10-CM

## 2022-12-06 NOTE — TELEPHONE ENCOUNTER
Patient states she received a letter that she is due for a mammogram.  Number for Central Scheduling given.

## 2023-01-10 ENCOUNTER — LAB ENCOUNTER (OUTPATIENT)
Dept: LAB | Age: 68
End: 2023-01-10
Attending: INTERNAL MEDICINE
Payer: MEDICARE

## 2023-01-10 ENCOUNTER — OFFICE VISIT (OUTPATIENT)
Dept: FAMILY MEDICINE CLINIC | Facility: CLINIC | Age: 68
End: 2023-01-10
Payer: MEDICARE

## 2023-01-10 VITALS
TEMPERATURE: 98 F | WEIGHT: 184.38 LBS | HEART RATE: 74 BPM | DIASTOLIC BLOOD PRESSURE: 74 MMHG | OXYGEN SATURATION: 97 % | RESPIRATION RATE: 16 BRPM | SYSTOLIC BLOOD PRESSURE: 136 MMHG | BODY MASS INDEX: 34 KG/M2

## 2023-01-10 DIAGNOSIS — Z79.4 TYPE 2 DIABETES MELLITUS WITHOUT COMPLICATION, WITH LONG-TERM CURRENT USE OF INSULIN (HCC): ICD-10-CM

## 2023-01-10 DIAGNOSIS — E11.9 TYPE 2 DIABETES MELLITUS WITHOUT COMPLICATION, WITH LONG-TERM CURRENT USE OF INSULIN (HCC): Primary | ICD-10-CM

## 2023-01-10 DIAGNOSIS — Z79.4 TYPE 2 DIABETES MELLITUS WITHOUT COMPLICATION, WITH LONG-TERM CURRENT USE OF INSULIN (HCC): Primary | ICD-10-CM

## 2023-01-10 DIAGNOSIS — E11.9 TYPE 2 DIABETES MELLITUS WITHOUT COMPLICATION, WITH LONG-TERM CURRENT USE OF INSULIN (HCC): ICD-10-CM

## 2023-01-10 LAB
ALBUMIN SERPL-MCNC: 4 G/DL (ref 3.4–5)
ALBUMIN/GLOB SERPL: 1.1 {RATIO} (ref 1–2)
ALP LIVER SERPL-CCNC: 69 U/L
ALT SERPL-CCNC: 73 U/L
ANION GAP SERPL CALC-SCNC: 5 MMOL/L (ref 0–18)
AST SERPL-CCNC: 46 U/L (ref 15–37)
BILIRUB SERPL-MCNC: 0.5 MG/DL (ref 0.1–2)
BUN BLD-MCNC: 18 MG/DL (ref 7–18)
CALCIUM BLD-MCNC: 9.7 MG/DL (ref 8.5–10.1)
CHLORIDE SERPL-SCNC: 104 MMOL/L (ref 98–112)
CO2 SERPL-SCNC: 28 MMOL/L (ref 21–32)
CREAT BLD-MCNC: 0.68 MG/DL
EST. AVERAGE GLUCOSE BLD GHB EST-MCNC: 180 MG/DL (ref 68–126)
FASTING STATUS PATIENT QL REPORTED: YES
GFR SERPLBLD BASED ON 1.73 SQ M-ARVRAT: 97 ML/MIN/1.73M2 (ref 60–?)
GLOBULIN PLAS-MCNC: 3.6 G/DL (ref 2.8–4.4)
GLUCOSE BLD-MCNC: 171 MG/DL (ref 70–99)
HBA1C MFR BLD: 7.9 % (ref ?–5.7)
OSMOLALITY SERPL CALC.SUM OF ELEC: 290 MOSM/KG (ref 275–295)
POTASSIUM SERPL-SCNC: 4.6 MMOL/L (ref 3.5–5.1)
PROT SERPL-MCNC: 7.6 G/DL (ref 6.4–8.2)
SODIUM SERPL-SCNC: 137 MMOL/L (ref 136–145)

## 2023-01-10 PROCEDURE — 83036 HEMOGLOBIN GLYCOSYLATED A1C: CPT

## 2023-01-10 PROCEDURE — 36415 COLL VENOUS BLD VENIPUNCTURE: CPT

## 2023-01-10 PROCEDURE — 99214 OFFICE O/P EST MOD 30 MIN: CPT | Performed by: INTERNAL MEDICINE

## 2023-01-10 PROCEDURE — 80053 COMPREHEN METABOLIC PANEL: CPT

## 2023-01-10 RX ORDER — BLOOD-GLUCOSE SENSOR
1 EACH MISCELLANEOUS
Qty: 6 EACH | Refills: 1 | Status: SHIPPED | OUTPATIENT
Start: 2023-01-10 | End: 2023-04-10

## 2023-01-13 ENCOUNTER — HOSPITAL ENCOUNTER (OUTPATIENT)
Dept: MAMMOGRAPHY | Age: 68
Discharge: HOME | End: 2023-01-13
Attending: INTERNAL MEDICINE
Payer: MEDICARE

## 2023-01-13 DIAGNOSIS — Z12.31 ENCOUNTER FOR SCREENING MAMMOGRAM FOR BREAST CANCER: ICD-10-CM

## 2023-01-13 PROCEDURE — 77067 SCR MAMMO BI INCL CAD: CPT | Performed by: INTERNAL MEDICINE

## 2023-01-13 PROCEDURE — 77063 BREAST TOMOSYNTHESIS BI: CPT | Performed by: INTERNAL MEDICINE

## 2023-01-16 RX ORDER — METOPROLOL TARTRATE 50 MG/1
TABLET, FILM COATED ORAL
Qty: 180 TABLET | Refills: 1 | Status: SHIPPED | OUTPATIENT
Start: 2023-01-16

## 2023-01-16 NOTE — TELEPHONE ENCOUNTER
Hypertension Medications Protocol Passed 01/16/2023 04:54 PM   Protocol Details  CMP or BMP in past 12 months    Last serum creatinine< 2.0    Appointment in past 6 or next 3 months       Last refill - 9/22/2- #180   Last CMP - 1/10/23 - creatinine - 0.68  Last office visit - 1/10/23  Refilled per protocol

## 2023-01-26 ENCOUNTER — OFFICE VISIT (OUTPATIENT)
Dept: FAMILY MEDICINE CLINIC | Facility: CLINIC | Age: 68
End: 2023-01-26
Payer: MEDICARE

## 2023-01-26 VITALS
TEMPERATURE: 96 F | OXYGEN SATURATION: 99 % | HEART RATE: 85 BPM | SYSTOLIC BLOOD PRESSURE: 135 MMHG | RESPIRATION RATE: 17 BRPM | DIASTOLIC BLOOD PRESSURE: 59 MMHG | BODY MASS INDEX: 34 KG/M2 | WEIGHT: 184 LBS

## 2023-01-26 DIAGNOSIS — R05.1 ACUTE COUGH: Primary | ICD-10-CM

## 2023-01-26 DIAGNOSIS — J34.89 SINUS PRESSURE: ICD-10-CM

## 2023-01-26 PROCEDURE — 87637 SARSCOV2&INF A&B&RSV AMP PRB: CPT

## 2023-01-26 PROCEDURE — 99214 OFFICE O/P EST MOD 30 MIN: CPT

## 2023-01-26 RX ORDER — TRAMADOL HYDROCHLORIDE 50 MG/1
50 TABLET ORAL
COMMUNITY
Start: 2022-11-10

## 2023-01-26 RX ORDER — CLOBETASOL PROPIONATE 0.05 G/100ML
SHAMPOO TOPICAL
COMMUNITY
Start: 2023-01-10

## 2023-01-26 RX ORDER — GLIMEPIRIDE 2 MG/1
1 TABLET ORAL
COMMUNITY
Start: 2022-08-30

## 2023-01-26 RX ORDER — BENZONATATE 200 MG/1
200 CAPSULE ORAL 3 TIMES DAILY PRN
Qty: 21 CAPSULE | Refills: 0 | Status: SHIPPED | OUTPATIENT
Start: 2023-01-26 | End: 2023-02-02

## 2023-01-26 NOTE — PATIENT INSTRUCTIONS
Use Afrin twice a day for 3 days and then switch to Flonase daily. Use mucinex or generic over the counter to help lessen mucus secretions. You can use tessalon/benzonatate prescription to help with cough.

## 2023-01-27 LAB
FLUAV + FLUBV RNA SPEC NAA+PROBE: NOT DETECTED
FLUAV + FLUBV RNA SPEC NAA+PROBE: NOT DETECTED
RSV RNA SPEC NAA+PROBE: NOT DETECTED
SARS-COV-2 RNA RESP QL NAA+PROBE: NOT DETECTED

## 2023-01-30 RX ORDER — AMOXICILLIN AND CLAVULANATE POTASSIUM 875; 125 MG/1; MG/1
1 TABLET, FILM COATED ORAL 2 TIMES DAILY
Qty: 20 TABLET | Refills: 0 | Status: SHIPPED | OUTPATIENT
Start: 2023-01-30 | End: 2023-02-09

## 2023-02-01 ENCOUNTER — TELEPHONE (OUTPATIENT)
Dept: FAMILY MEDICINE CLINIC | Facility: CLINIC | Age: 68
End: 2023-02-01

## 2023-02-01 NOTE — TELEPHONE ENCOUNTER
Patient said that she started the antibiotics Monday night and diarrhea started last \"night\" and has had it 6x today. She said that she felt like she had chest palpitations last night and she was slightly nauseated. She said that sinus symptoms have not improved much but she is not running a fever. She has been off work since last Thursday, she also needs a note.

## 2023-02-02 NOTE — TELEPHONE ENCOUNTER
Stop antibiotic. Let your body reset and then Monday if diarrhea resolved and you still have sinus symptoms then CIPRO. I put it in her allergy list as a side effect, very common. Note generated.

## 2023-02-08 ENCOUNTER — TELEPHONE (OUTPATIENT)
Dept: FAMILY MEDICINE CLINIC | Facility: CLINIC | Age: 68
End: 2023-02-08

## 2023-02-08 RX ORDER — CIPROFLOXACIN 500 MG/1
500 TABLET, FILM COATED ORAL 2 TIMES DAILY
Qty: 20 TABLET | Refills: 0 | Status: SHIPPED | OUTPATIENT
Start: 2023-02-08 | End: 2023-02-18

## 2023-02-08 NOTE — TELEPHONE ENCOUNTER
SHE STARTED HAVING EAR PAIN/JAW PAIN. SHE REFUSED APPT. WITH [facility] AND ASKING TO SPEAK WITH DR. Alisia Townsend NURSE. SHE IS HOPING TO GET SOMETHING CALLED IN.

## 2023-02-08 NOTE — TELEPHONE ENCOUNTER
Patient said that she never got completely better and last night she started having ear pain and pain down her throat. She was on Augmentin and stopped it due to having diarrhea. Dr Rubens Bain said if she was not improving she would prescribe Cipro. Patient uses [facility].

## 2023-02-18 ENCOUNTER — TELEPHONE (OUTPATIENT)
Dept: FAMILY MEDICINE CLINIC | Facility: CLINIC | Age: 68
End: 2023-02-18

## 2023-02-18 DIAGNOSIS — E11.9 TYPE 2 DIABETES MELLITUS WITHOUT COMPLICATION, WITH LONG-TERM CURRENT USE OF INSULIN (HCC): ICD-10-CM

## 2023-02-18 DIAGNOSIS — Z79.4 TYPE 2 DIABETES MELLITUS WITHOUT COMPLICATION, WITH LONG-TERM CURRENT USE OF INSULIN (HCC): ICD-10-CM

## 2023-02-18 NOTE — TELEPHONE ENCOUNTER
Clarify which sliding scale she is using for her humalog- there are 2 different instructions.     LEFT MESSAGE TO CALL OFFICE

## 2023-02-21 RX ORDER — INSULIN LISPRO 200 [IU]/ML
INJECTION, SOLUTION SUBCUTANEOUS
Qty: 5 EACH | Refills: 1 | Status: SHIPPED | OUTPATIENT
Start: 2023-02-21

## 2023-02-21 RX ORDER — INSULIN GLARGINE 300 U/ML
INJECTION, SOLUTION SUBCUTANEOUS
Qty: 3 ML | Refills: 2 | Status: SHIPPED | OUTPATIENT
Start: 2023-02-21

## 2023-02-21 NOTE — TELEPHONE ENCOUNTER
Reviewed with patient what her current insulin doses are, and scripts sent to [facility]. Diabetic nurse educator had adjusted doses.

## 2023-02-24 ENCOUNTER — TELEPHONE (OUTPATIENT)
Dept: FAMILY MEDICINE CLINIC | Facility: CLINIC | Age: 68
End: 2023-02-24

## 2023-03-21 ENCOUNTER — OFFICE VISIT (OUTPATIENT)
Dept: FAMILY MEDICINE CLINIC | Facility: CLINIC | Age: 68
End: 2023-03-21
Payer: MEDICARE

## 2023-03-21 ENCOUNTER — LAB ENCOUNTER (OUTPATIENT)
Dept: LAB | Age: 68
End: 2023-03-21
Attending: INTERNAL MEDICINE
Payer: COMMERCIAL

## 2023-03-21 VITALS
WEIGHT: 185.13 LBS | DIASTOLIC BLOOD PRESSURE: 64 MMHG | OXYGEN SATURATION: 99 % | BODY MASS INDEX: 34.07 KG/M2 | SYSTOLIC BLOOD PRESSURE: 138 MMHG | TEMPERATURE: 98 F | RESPIRATION RATE: 18 BRPM | HEIGHT: 62 IN | HEART RATE: 68 BPM

## 2023-03-21 DIAGNOSIS — E11.9 TYPE 2 DIABETES MELLITUS WITHOUT COMPLICATION, WITH LONG-TERM CURRENT USE OF INSULIN (HCC): ICD-10-CM

## 2023-03-21 DIAGNOSIS — Z79.4 TYPE 2 DIABETES MELLITUS WITH HYPERGLYCEMIA, WITH LONG-TERM CURRENT USE OF INSULIN (HCC): ICD-10-CM

## 2023-03-21 DIAGNOSIS — Z12.31 ENCOUNTER FOR SCREENING MAMMOGRAM FOR BREAST CANCER: Primary | ICD-10-CM

## 2023-03-21 DIAGNOSIS — R19.7 DIARRHEA, UNSPECIFIED TYPE: ICD-10-CM

## 2023-03-21 DIAGNOSIS — I10 ESSENTIAL HYPERTENSION: ICD-10-CM

## 2023-03-21 DIAGNOSIS — Z78.0 POSTMENOPAUSAL: ICD-10-CM

## 2023-03-21 DIAGNOSIS — E11.65 TYPE 2 DIABETES MELLITUS WITH HYPERGLYCEMIA, WITH LONG-TERM CURRENT USE OF INSULIN (HCC): ICD-10-CM

## 2023-03-21 DIAGNOSIS — Z79.4 TYPE 2 DIABETES MELLITUS WITHOUT COMPLICATION, WITH LONG-TERM CURRENT USE OF INSULIN (HCC): ICD-10-CM

## 2023-03-21 DIAGNOSIS — Z00.00 ENCOUNTER FOR ANNUAL HEALTH EXAMINATION: ICD-10-CM

## 2023-03-21 LAB
ALBUMIN SERPL-MCNC: 3.9 G/DL (ref 3.4–5)
ALBUMIN/GLOB SERPL: 1 {RATIO} (ref 1–2)
ALP LIVER SERPL-CCNC: 84 U/L
ALT SERPL-CCNC: 67 U/L
ANION GAP SERPL CALC-SCNC: 1 MMOL/L (ref 0–18)
AST SERPL-CCNC: 43 U/L (ref 15–37)
BASOPHILS # BLD AUTO: 0.03 X10(3) UL (ref 0–0.2)
BASOPHILS NFR BLD AUTO: 0.4 %
BILIRUB SERPL-MCNC: 0.4 MG/DL (ref 0.1–2)
BUN BLD-MCNC: 12 MG/DL (ref 7–18)
CALCIUM BLD-MCNC: 10.1 MG/DL (ref 8.5–10.1)
CHLORIDE SERPL-SCNC: 106 MMOL/L (ref 98–112)
CHOLEST SERPL-MCNC: 133 MG/DL (ref ?–200)
CO2 SERPL-SCNC: 29 MMOL/L (ref 21–32)
CREAT BLD-MCNC: 0.57 MG/DL
EOSINOPHIL # BLD AUTO: 0.19 X10(3) UL (ref 0–0.7)
EOSINOPHIL NFR BLD AUTO: 2.3 %
ERYTHROCYTE [DISTWIDTH] IN BLOOD BY AUTOMATED COUNT: 12.7 %
EST. AVERAGE GLUCOSE BLD GHB EST-MCNC: 235 MG/DL (ref 68–126)
FASTING PATIENT LIPID ANSWER: YES
FASTING STATUS PATIENT QL REPORTED: YES
GFR SERPLBLD BASED ON 1.73 SQ M-ARVRAT: 100 ML/MIN/1.73M2 (ref 60–?)
GLOBULIN PLAS-MCNC: 3.9 G/DL (ref 2.8–4.4)
GLUCOSE BLD-MCNC: 244 MG/DL (ref 70–99)
HBA1C MFR BLD: 9.8 % (ref ?–5.7)
HCT VFR BLD AUTO: 41.4 %
HDLC SERPL-MCNC: 51 MG/DL (ref 40–59)
HGB BLD-MCNC: 13.1 G/DL
IMM GRANULOCYTES # BLD AUTO: 0.04 X10(3) UL (ref 0–1)
IMM GRANULOCYTES NFR BLD: 0.5 %
LDLC SERPL CALC-MCNC: 62 MG/DL (ref ?–100)
LYMPHOCYTES # BLD AUTO: 2.51 X10(3) UL (ref 1–4)
LYMPHOCYTES NFR BLD AUTO: 30 %
MCH RBC QN AUTO: 28.9 PG (ref 26–34)
MCHC RBC AUTO-ENTMCNC: 31.6 G/DL (ref 31–37)
MCV RBC AUTO: 91.2 FL
MONOCYTES # BLD AUTO: 0.61 X10(3) UL (ref 0.1–1)
MONOCYTES NFR BLD AUTO: 7.3 %
NEUTROPHILS # BLD AUTO: 4.99 X10 (3) UL (ref 1.5–7.7)
NEUTROPHILS # BLD AUTO: 4.99 X10(3) UL (ref 1.5–7.7)
NEUTROPHILS NFR BLD AUTO: 59.5 %
NONHDLC SERPL-MCNC: 82 MG/DL (ref ?–130)
OSMOLALITY SERPL CALC.SUM OF ELEC: 290 MOSM/KG (ref 275–295)
PLATELET # BLD AUTO: 216 10(3)UL (ref 150–450)
POTASSIUM SERPL-SCNC: 4.8 MMOL/L (ref 3.5–5.1)
PROT SERPL-MCNC: 7.8 G/DL (ref 6.4–8.2)
RBC # BLD AUTO: 4.54 X10(6)UL
SODIUM SERPL-SCNC: 136 MMOL/L (ref 136–145)
T4 FREE SERPL-MCNC: 1 NG/DL (ref 0.8–1.7)
TRIGL SERPL-MCNC: 111 MG/DL (ref 30–149)
TSI SER-ACNC: 1.53 MIU/ML (ref 0.36–3.74)
VLDLC SERPL CALC-MCNC: 16 MG/DL (ref 0–30)
WBC # BLD AUTO: 8.4 X10(3) UL (ref 4–11)

## 2023-03-21 PROCEDURE — 85025 COMPLETE CBC W/AUTO DIFF WBC: CPT

## 2023-03-21 PROCEDURE — G0439 PPPS, SUBSEQ VISIT: HCPCS | Performed by: INTERNAL MEDICINE

## 2023-03-21 PROCEDURE — 36415 COLL VENOUS BLD VENIPUNCTURE: CPT

## 2023-03-21 PROCEDURE — 84443 ASSAY THYROID STIM HORMONE: CPT

## 2023-03-21 PROCEDURE — 90732 PPSV23 VACC 2 YRS+ SUBQ/IM: CPT | Performed by: INTERNAL MEDICINE

## 2023-03-21 PROCEDURE — 1126F AMNT PAIN NOTED NONE PRSNT: CPT | Performed by: INTERNAL MEDICINE

## 2023-03-21 PROCEDURE — 80053 COMPREHEN METABOLIC PANEL: CPT

## 2023-03-21 PROCEDURE — G0009 ADMIN PNEUMOCOCCAL VACCINE: HCPCS | Performed by: INTERNAL MEDICINE

## 2023-03-21 PROCEDURE — 80061 LIPID PANEL: CPT

## 2023-03-21 PROCEDURE — 84439 ASSAY OF FREE THYROXINE: CPT

## 2023-03-21 PROCEDURE — 83036 HEMOGLOBIN GLYCOSYLATED A1C: CPT

## 2023-03-21 RX ORDER — CETIRIZINE HYDROCHLORIDE 10 MG/1
10 TABLET ORAL DAILY
COMMUNITY

## 2023-03-21 RX ORDER — TRAZODONE HYDROCHLORIDE 50 MG/1
50 TABLET ORAL NIGHTLY
Qty: 30 TABLET | Refills: 0 | Status: SHIPPED | OUTPATIENT
Start: 2023-03-21 | End: 2023-04-20

## 2023-03-21 RX ORDER — METFORMIN HYDROCHLORIDE 500 MG/1
500 TABLET, EXTENDED RELEASE ORAL 2 TIMES DAILY WITH MEALS
Qty: 180 TABLET | Refills: 1 | Status: CANCELLED | OUTPATIENT
Start: 2023-03-21

## 2023-03-22 RX ORDER — GLIMEPIRIDE 2 MG/1
2 TABLET ORAL
Qty: 90 TABLET | Refills: 0 | Status: SHIPPED | OUTPATIENT
Start: 2023-03-22 | End: 2023-06-20

## 2023-03-23 ENCOUNTER — TELEPHONE (OUTPATIENT)
Dept: FAMILY MEDICINE CLINIC | Facility: CLINIC | Age: 68
End: 2023-03-23

## 2023-03-23 DIAGNOSIS — E11.9 TYPE 2 DIABETES MELLITUS WITHOUT COMPLICATION, WITH LONG-TERM CURRENT USE OF INSULIN (HCC): ICD-10-CM

## 2023-03-23 DIAGNOSIS — Z79.4 TYPE 2 DIABETES MELLITUS WITHOUT COMPLICATION, WITH LONG-TERM CURRENT USE OF INSULIN (HCC): ICD-10-CM

## 2023-03-23 RX ORDER — INSULIN GLARGINE 300 U/ML
INJECTION, SOLUTION SUBCUTANEOUS
Qty: 3 ML | Refills: 6 | Status: SHIPPED | OUTPATIENT
Start: 2023-03-23

## 2023-04-24 ENCOUNTER — TELEPHONE (OUTPATIENT)
Dept: FAMILY MEDICINE CLINIC | Facility: CLINIC | Age: 68
End: 2023-04-24

## 2023-04-24 RX ORDER — DULAGLUTIDE 0.75 MG/.5ML
0.75 INJECTION, SOLUTION SUBCUTANEOUS WEEKLY
Qty: 1 EACH | Refills: 0 | Status: SHIPPED | OUTPATIENT
Start: 2023-04-24 | End: 2023-05-24

## 2023-04-24 NOTE — TELEPHONE ENCOUNTER
Pt. Said she had discussed with Dr. Josselin Amor in one of her last visits about getting on some type of appetite suppressant, she is asking if Dr. Josselin Amor would prescribe something for her.

## 2023-04-24 NOTE — TELEPHONE ENCOUNTER
Patient said that Trulicity gave her diarrhea and made her nauseated.  She said she is just looking for something to \"curb her appetite\"

## 2023-04-24 NOTE — TELEPHONE ENCOUNTER
We discussed trulicity, this has duel purpose to malgorzata down her sugars and promote weight loss. She has to monitor sugars.

## 2023-04-27 RX ORDER — TOPIRAMATE 50 MG/1
50 TABLET, FILM COATED ORAL 2 TIMES DAILY
Qty: 60 TABLET | Refills: 3 | Status: SHIPPED | OUTPATIENT
Start: 2023-04-27 | End: 2023-05-27

## 2023-04-27 NOTE — TELEPHONE ENCOUNTER
Patient notified. She picked up the trulicity. She states that she has not tried this in the past due to cost.  It was \"the other\" medication that caused the diarrhea. FYI to Dr. José Bermeo.

## 2023-04-27 NOTE — TELEPHONE ENCOUNTER
You can try topamax, it helps to curb appetite without causing tachyarrhythmia or HTN. Stay on a 1500 lorena diet and record foods through my fitness pal efe and 30 minutes activity a day.

## 2023-05-24 RX ORDER — DULAGLUTIDE 0.75 MG/.5ML
0.75 INJECTION, SOLUTION SUBCUTANEOUS WEEKLY
Qty: 1 EACH | Refills: 0 | Status: SHIPPED | OUTPATIENT
Start: 2023-05-24 | End: 2023-06-23

## 2023-05-24 NOTE — TELEPHONE ENCOUNTER
Last OV w/Dr Fili Rose 3/21/23  Last Hgba1c 3/21/23  Last refill 4/24/23   Ok for additional refills?

## 2023-05-26 ENCOUNTER — TELEPHONE (OUTPATIENT)
Dept: FAMILY MEDICINE CLINIC | Facility: CLINIC | Age: 68
End: 2023-05-26

## 2023-06-07 ENCOUNTER — MED REC SCAN ONLY (OUTPATIENT)
Dept: FAMILY MEDICINE CLINIC | Facility: CLINIC | Age: 68
End: 2023-06-07

## 2023-06-12 RX ORDER — DULAGLUTIDE 0.75 MG/.5ML
INJECTION, SOLUTION SUBCUTANEOUS
Qty: 4 ML | Refills: 0 | Status: SHIPPED | OUTPATIENT
Start: 2023-06-12

## 2023-06-13 RX ORDER — GLIMEPIRIDE 2 MG/1
TABLET ORAL
Qty: 90 TABLET | Refills: 0 | Status: SHIPPED | OUTPATIENT
Start: 2023-06-13

## 2023-07-03 RX ORDER — METOPROLOL TARTRATE 50 MG/1
TABLET, FILM COATED ORAL
Qty: 180 TABLET | Refills: 0 | Status: SHIPPED | OUTPATIENT
Start: 2023-07-03

## 2023-07-17 ENCOUNTER — NURSE ONLY (OUTPATIENT)
Dept: ENDOCRINOLOGY CLINIC | Facility: CLINIC | Age: 68
End: 2023-07-17
Payer: MEDICARE

## 2023-07-17 ENCOUNTER — TELEPHONE (OUTPATIENT)
Dept: FAMILY MEDICINE CLINIC | Facility: CLINIC | Age: 68
End: 2023-07-17

## 2023-07-17 DIAGNOSIS — E11.9 TYPE 2 DIABETES MELLITUS WITHOUT COMPLICATION, WITH LONG-TERM CURRENT USE OF INSULIN (HCC): Primary | ICD-10-CM

## 2023-07-17 DIAGNOSIS — Z79.4 TYPE 2 DIABETES MELLITUS WITHOUT COMPLICATION, WITH LONG-TERM CURRENT USE OF INSULIN (HCC): Primary | ICD-10-CM

## 2023-07-17 PROCEDURE — G0108 DIAB MANAGE TRN  PER INDIV: HCPCS | Performed by: DIETITIAN, REGISTERED

## 2023-07-17 NOTE — TELEPHONE ENCOUNTER
BLOOD SUGARS HAVE BEEN ELEV, WANTS TO GET HER A1C DONE AGAIN, NO ORDER IN COMPUTER, CALL PT WHEN ORDERS HAVE BEEN PUT IN SO SHE CAN MAKE APPT

## 2023-07-18 ENCOUNTER — LABORATORY ENCOUNTER (OUTPATIENT)
Dept: LAB | Age: 68
End: 2023-07-18
Attending: INTERNAL MEDICINE
Payer: MEDICARE

## 2023-07-18 DIAGNOSIS — Z79.4 TYPE 2 DIABETES MELLITUS WITHOUT COMPLICATION, WITH LONG-TERM CURRENT USE OF INSULIN (HCC): ICD-10-CM

## 2023-07-18 DIAGNOSIS — E11.9 TYPE 2 DIABETES MELLITUS WITHOUT COMPLICATION, WITH LONG-TERM CURRENT USE OF INSULIN (HCC): ICD-10-CM

## 2023-07-18 LAB
ALBUMIN SERPL-MCNC: 3.9 G/DL (ref 3.4–5)
ALBUMIN/GLOB SERPL: 1 {RATIO} (ref 1–2)
ALP LIVER SERPL-CCNC: 74 U/L
ALT SERPL-CCNC: 68 U/L
ANION GAP SERPL CALC-SCNC: 4 MMOL/L (ref 0–18)
AST SERPL-CCNC: 35 U/L (ref 15–37)
BILIRUB SERPL-MCNC: 0.4 MG/DL (ref 0.1–2)
BUN BLD-MCNC: 15 MG/DL (ref 7–18)
CALCIUM BLD-MCNC: 10.2 MG/DL (ref 8.5–10.1)
CHLORIDE SERPL-SCNC: 104 MMOL/L (ref 98–112)
CO2 SERPL-SCNC: 29 MMOL/L (ref 21–32)
CREAT BLD-MCNC: 0.77 MG/DL
FASTING STATUS PATIENT QL REPORTED: YES
GFR SERPLBLD BASED ON 1.73 SQ M-ARVRAT: 85 ML/MIN/1.73M2 (ref 60–?)
GLOBULIN PLAS-MCNC: 3.8 G/DL (ref 2.8–4.4)
GLUCOSE BLD-MCNC: 143 MG/DL (ref 70–99)
OSMOLALITY SERPL CALC.SUM OF ELEC: 287 MOSM/KG (ref 275–295)
POTASSIUM SERPL-SCNC: 4.8 MMOL/L (ref 3.5–5.1)
PROT SERPL-MCNC: 7.7 G/DL (ref 6.4–8.2)
SODIUM SERPL-SCNC: 137 MMOL/L (ref 136–145)

## 2023-07-18 PROCEDURE — 80053 COMPREHEN METABOLIC PANEL: CPT

## 2023-07-18 PROCEDURE — 36415 COLL VENOUS BLD VENIPUNCTURE: CPT

## 2023-07-18 PROCEDURE — 83036 HEMOGLOBIN GLYCOSYLATED A1C: CPT

## 2023-07-19 VITALS — WEIGHT: 187 LBS | BODY MASS INDEX: 34 KG/M2

## 2023-07-19 LAB
EST. AVERAGE GLUCOSE BLD GHB EST-MCNC: 229 MG/DL (ref 68–126)
HBA1C MFR BLD: 9.6 % (ref ?–5.7)

## 2023-07-19 NOTE — PROGRESS NOTES
Ezzard Gosselin : [id number] was seen for Diabetic Education Follow up:    Date: 23  Referring Provider: Dr. Deanna Villa  Start time: 2pm End time: 3pm    Assessment:     Diagnosis: Uncontrolled Type 2    Reason for visit: Follow-up;A1C elevated    Changes since last visit:  - Meals:pt. Stopped keeping a food journal  - Medications:can't afford Trulicity;will apply for pt. 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 - Testing Schedule: 3 times daily    TAKING MEDICATION:  Oral Agents:   Glimepiride 2 mg daily before breakfast    Injectables: Toujeo 54 units daily  Humalog 5 units premeal +JGABM3:21 >709  Trulicity 9.57 mg weekly    PROBLEM SOLVING:  Pt. needs titration on insulins; will recommend referral to Diabetes Services- AVERY Garcia    REDUCING RISKS:  - relationship between glucose control and risk for complications in eye, heart, kidneys,nerves,teeth,foot care, skin,  Foot Care Guidelines    Recommendations:      1. Follow recommended meal plan;return to 30-45 grams per meal   2. Test BG fasting and premeal 3 times/day. 3. Bring glucose logs /meter to all provider visits for review. 4. Increase Toujeo to 58 units, Humalog 10 units 3 times daily premeal + scale   5. Increase Trulicity to 1.5 mg weekly;provided w/PAP forms & encouraged to call Medicare about extra help   6. Encouraged  to call diabetes center with any questions or concerns. 7. Scheduled with AVERY Garcia for diabetes management   8. Pt.  Will send BG readings in 2 wks    Patient verbalized understanding and has no further questions at this time.     Sherita Ireland RN, [facility]

## 2023-08-01 ENCOUNTER — TELEPHONE (OUTPATIENT)
Dept: FAMILY MEDICINE CLINIC | Facility: CLINIC | Age: 68
End: 2023-08-01

## 2023-08-01 DIAGNOSIS — E11.9 TYPE 2 DIABETES MELLITUS WITHOUT COMPLICATION, WITH LONG-TERM CURRENT USE OF INSULIN (HCC): ICD-10-CM

## 2023-08-01 DIAGNOSIS — Z79.4 TYPE 2 DIABETES MELLITUS WITHOUT COMPLICATION, WITH LONG-TERM CURRENT USE OF INSULIN (HCC): ICD-10-CM

## 2023-08-01 RX ORDER — GLIMEPIRIDE 4 MG/1
4 TABLET ORAL
Qty: 90 TABLET | Refills: 0 | Status: SHIPPED | OUTPATIENT
Start: 2023-08-01

## 2023-08-01 RX ORDER — INSULIN GLARGINE 300 U/ML
INJECTION, SOLUTION SUBCUTANEOUS
Qty: 3 ML | Refills: 2 | COMMUNITY
Start: 2023-08-01

## 2023-08-01 NOTE — TELEPHONE ENCOUNTER
Patient said that her blood sugar is 186 and she is just eating right now. She said that her blood sugar have been running around 200. She said that [facility] increased her Toujeo to 58 units daily and she is also taking Humalog sliding scale, and glimepiride 2mg (she forgot to take this last week).  She said she cannot get the Trulicity through [facility] because they have a waiting list.

## 2023-08-01 NOTE — TELEPHONE ENCOUNTER
BLOOD SUGARS HAVE BEEN 'S, SHE CAN NOT AFFORD TRULICITY & WHEN SHE TRIED APPLYING FOR ASSISTANCE FOR IT SHE WAS TOLD THEY ARE NOT TAKING ANY APPLICATIONS, NOT SURE WHAT TO DO, CALL PT BACK

## 2023-08-01 NOTE — TELEPHONE ENCOUNTER
Change to Glimepiride 4 mg daily ; this should bring down sugars. Please notify patient of neg results of RPR

## 2023-08-02 NOTE — H&P
Gorge Castle is a 66year old female who presents for a pre-operative physical exam.     Patient presents with:   Other: pre op for Dr. Yaima Rios for cystoscopy RT urethral stent placement RT ESWL on 06/04/18 at the [address] [place] [address] Patient Quality Outreach    Patient is due for the following:       Topic Date Due    COVID-19 Vaccine (1) Never done    Pneumococcal Vaccine (1 - PCV) Never done       Next Steps:   Patient has upcoming appointment, these items will be addressed at that time.    Type of outreach:    Chart review performed, no outreach needed.    Next Steps:  Reach out within 90 days via  none .    Max number of attempts reached: No. Will try again in 90 days if patient still on fail list.    Questions for provider review:    None           Elsi Calzada MA         Comment:GI upset  Iodine (Topical)        HIVES   Past Medical History:   Diagnosis Date   • Allergic rhinitis    • Anxiety state    • Depression    • DM type 2 (diabetes mellitus, type 2) ([facility] [address]    • Essential hypertension    • HTN (hypertensio is 33.56 kg/m².   GENERAL: well developed, well nourished,in no apparent distress  SKIN: no rashes,no suspicious lesions  HEENT: atraumatic, normocephalic,ears and throat are clear  EYES:PERRLA, EOMI,conjunctiva are clear  NECK: supple,no adenopathy,no brui

## 2023-08-12 ENCOUNTER — OFFICE VISIT (OUTPATIENT)
Dept: FAMILY MEDICINE CLINIC | Facility: CLINIC | Age: 68
End: 2023-08-12
Payer: MEDICARE

## 2023-08-12 VITALS
OXYGEN SATURATION: 97 % | BODY MASS INDEX: 34 KG/M2 | DIASTOLIC BLOOD PRESSURE: 74 MMHG | HEART RATE: 81 BPM | TEMPERATURE: 98 F | RESPIRATION RATE: 20 BRPM | SYSTOLIC BLOOD PRESSURE: 148 MMHG | WEIGHT: 187 LBS

## 2023-08-12 DIAGNOSIS — R53.83 FATIGUE, UNSPECIFIED TYPE: Primary | ICD-10-CM

## 2023-08-12 DIAGNOSIS — E11.65 TYPE 2 DIABETES MELLITUS WITH HYPERGLYCEMIA, WITH LONG-TERM CURRENT USE OF INSULIN (HCC): ICD-10-CM

## 2023-08-12 DIAGNOSIS — Z79.4 TYPE 2 DIABETES MELLITUS WITH HYPERGLYCEMIA, WITH LONG-TERM CURRENT USE OF INSULIN (HCC): ICD-10-CM

## 2023-08-12 DIAGNOSIS — M62.89 MUSCLE TIGHTNESS: ICD-10-CM

## 2023-08-12 DIAGNOSIS — R06.02 SOB (SHORTNESS OF BREATH): ICD-10-CM

## 2023-08-12 PROBLEM — M65.352 ACQUIRED TRIGGER FINGER OF LEFT LITTLE FINGER: Status: ACTIVE | Noted: 2022-11-10

## 2023-08-12 PROBLEM — M65.342 TRIGGER FINGER, LEFT RING FINGER: Status: ACTIVE | Noted: 2018-07-24

## 2023-08-12 PROCEDURE — 87637 SARSCOV2&INF A&B&RSV AMP PRB: CPT

## 2023-08-12 PROCEDURE — 99214 OFFICE O/P EST MOD 30 MIN: CPT

## 2023-08-12 RX ORDER — CHLORHEXIDINE GLUCONATE 0.12 MG/ML
RINSE ORAL
COMMUNITY
Start: 2023-04-13

## 2023-08-12 RX ORDER — ALBUTEROL SULFATE 90 UG/1
2 AEROSOL, METERED RESPIRATORY (INHALATION) EVERY 6 HOURS PRN
Qty: 1 EACH | Refills: 0 | Status: SHIPPED | OUTPATIENT
Start: 2023-08-12

## 2023-08-13 LAB
FLUAV + FLUBV RNA SPEC NAA+PROBE: NEGATIVE
FLUAV + FLUBV RNA SPEC NAA+PROBE: NEGATIVE
RSV RNA SPEC NAA+PROBE: NEGATIVE
SARS-COV-2 RNA RESP QL NAA+PROBE: NOT DETECTED

## 2023-08-25 DIAGNOSIS — I10 ESSENTIAL HYPERTENSION: ICD-10-CM

## 2023-08-25 DIAGNOSIS — E11.9 TYPE 2 DIABETES MELLITUS WITHOUT COMPLICATION, WITH LONG-TERM CURRENT USE OF INSULIN (HCC): ICD-10-CM

## 2023-08-25 DIAGNOSIS — Z79.4 TYPE 2 DIABETES MELLITUS WITHOUT COMPLICATION, WITH LONG-TERM CURRENT USE OF INSULIN (HCC): ICD-10-CM

## 2023-08-25 RX ORDER — LISINOPRIL 10 MG/1
10 TABLET ORAL DAILY
Qty: 90 TABLET | Refills: 0 | Status: SHIPPED | OUTPATIENT
Start: 2023-08-25

## 2023-08-25 RX ORDER — TRAZODONE HYDROCHLORIDE 50 MG/1
50 TABLET ORAL NIGHTLY
Qty: 30 TABLET | Refills: 0 | Status: SHIPPED | OUTPATIENT
Start: 2023-08-25

## 2023-09-08 NOTE — TELEPHONE ENCOUNTER
Called the patient again- na/nm Chart reviewed for BMI of 16.4 kg/m2. Pt is observation patient. Recommend advance diet as tolerated and implement nutrition supplements as tolerated.    RD available via consult as needed.

## 2023-09-21 ENCOUNTER — OFFICE VISIT (OUTPATIENT)
Dept: ENDOCRINOLOGY CLINIC | Facility: CLINIC | Age: 68
End: 2023-09-21
Payer: MEDICARE

## 2023-09-21 VITALS
WEIGHT: 188 LBS | HEART RATE: 70 BPM | DIASTOLIC BLOOD PRESSURE: 70 MMHG | SYSTOLIC BLOOD PRESSURE: 136 MMHG | RESPIRATION RATE: 16 BRPM | BODY MASS INDEX: 34 KG/M2 | OXYGEN SATURATION: 97 %

## 2023-09-21 DIAGNOSIS — I10 PRIMARY HYPERTENSION: ICD-10-CM

## 2023-09-21 DIAGNOSIS — Z79.4 TYPE 2 DIABETES MELLITUS WITHOUT COMPLICATION, WITH LONG-TERM CURRENT USE OF INSULIN (HCC): Primary | ICD-10-CM

## 2023-09-21 DIAGNOSIS — E66.09 CLASS 1 OBESITY DUE TO EXCESS CALORIES WITH SERIOUS COMORBIDITY AND BODY MASS INDEX (BMI) OF 34.0 TO 34.9 IN ADULT: ICD-10-CM

## 2023-09-21 DIAGNOSIS — E11.9 TYPE 2 DIABETES MELLITUS WITHOUT COMPLICATION, WITH LONG-TERM CURRENT USE OF INSULIN (HCC): Primary | ICD-10-CM

## 2023-09-21 DIAGNOSIS — R80.9 MICROALBUMINURIA: ICD-10-CM

## 2023-09-21 PROCEDURE — 99215 OFFICE O/P EST HI 40 MIN: CPT | Performed by: NURSE PRACTITIONER

## 2023-09-21 PROCEDURE — 95251 CONT GLUC MNTR ANALYSIS I&R: CPT | Performed by: NURSE PRACTITIONER

## 2023-09-21 RX ORDER — GLUCAGON INJECTION, SOLUTION 1 MG/.2ML
1 INJECTION, SOLUTION SUBCUTANEOUS ONCE AS NEEDED
Qty: 0.2 ML | Refills: 0 | Status: SHIPPED | OUTPATIENT
Start: 2023-09-21 | End: 2023-09-22

## 2023-09-21 RX ORDER — ATORVASTATIN CALCIUM 10 MG/1
10 TABLET, FILM COATED ORAL NIGHTLY
Qty: 90 TABLET | Refills: 3 | Status: SHIPPED | OUTPATIENT
Start: 2023-09-21

## 2023-09-21 RX ORDER — INSULIN LISPRO 200 [IU]/ML
INJECTION, SOLUTION SUBCUTANEOUS
Qty: 24 ML | Refills: 3 | Status: SHIPPED | OUTPATIENT
Start: 2023-09-21

## 2023-09-21 RX ORDER — INSULIN GLARGINE 300 U/ML
INJECTION, SOLUTION SUBCUTANEOUS
Qty: 27 ML | Refills: 3 | Status: SHIPPED | OUTPATIENT
Start: 2023-09-21

## 2023-09-21 NOTE — PATIENT INSTRUCTIONS
We are here to support you with Diabetes but please remember that you still need your primary care doctor for your routine health maintenance. Your current A1C: 9.6%  This test provides us with your average blood sugar for the past 3 months. The main goal of diabetes treatment is to keep your sugar from going too high. We measure your overall blood sugar trends with a Hemoglobin A1C test. (also called an A1C)  For most people the target is less than 7.0% but sometimes we make exceptions based on age, health history and other factors. Keeping an A1C less than 7% helps prevents diabetes related health problems. If your A1C goes too high, then we need to talk about changing your current diabetes treatment. MEDICATIONS:   It is important to take all of your medications as prescribed. Please call me if you cannot get the prescriptions filled or are having issues with refills. Also, please call me if you have any issues with medication questions, side effects, dosing questions or problems with your blood sugar trends BEFORE CHANGING OR STOPPING ANY MEDICATIONS. Increase Toujeo to 64 units injection daily  Change Humalog to 2 units + sliding scale with breakfast and dinner and 4 units + sliding scale with dinner: You will need to check your blood sugar before each meal.  Please follow the scale below for your insulin dose: If blood sugar is 200-250, add 2 units of Humalog  If blood sugar is 251-300, add 4 units of Humalog  If blood sugar is 301-350, add 6 units of Humalog  If blood sugar is > 351, add 8 units of Humalog    Discontinue glimepiride  Start atorvastatin 10mg at bedtime    Blood sugar testing:   Always bring your glucose meter or blood sugar logbook to every appointment here at the diabetes center. This allows me to safely make adjustments to your diabetes plan.    In order for me to determine any patterns in your blood sugars, you will need to continue to use Dexcom CGM or test your blood sugar 3 times daily   Call if 2 readings below 80 mg/dl in 1 week for medication adjustment. Gvoke as needed for severe hypoglycemia (low blood sugar) sent to pharmacy    Blood sugar targets:  Before breakfast:   (preferably < 110)  2 hours After meals: less than 180 (preferably less than 150)   Call for persistent blood sugars < 75 or > 200. Blood sugars greater than 200 are not acceptable to reach your goal of improving diabetes      Health Maintenance:   1. LABS: It is important to monitor your kidney function (blood and urine protein levels) , liver function tests and cholesterol levels when you have diabetes. 2. FOOT EXAMS:  daily foot inspections for foot wounds or skin changes are important for foot care. Any unusual changes should be reported immediately. 3. EYE EXAMS: Checking your eyes for diabetes changes is important and you should have a dilated eye exam done by an eye doctor EVERY year since these changes occur in the BACK of the eye and not visible by you. Please let me know if you need provider list for eye doctor. Lifestyle Therapy:    1. NUTRITION: Maintain optimal weight, calorie restriction if overweight, plant-based diet    2. PHYSICAL ACTIVITY: 150 minutes per week (30 minutes a day 5 days a week) of moderate exertion such as walking, stair climbing. Include strength training 2-3 times per week. Increase as tolerated. 3. SLEEP: Try and get 7-8 hours of sleep per night    4. BEHAVIOR:  Tobacco cessation and alcohol in moderation      Reminders:  Due for dilated eye exam  Meet with dietitian, office will call to schedule    Damien 14, BC-ADM, [facility]  [address], [address], [address], [address] [facility], [address]  [address], [address]  Diabetes Services at [address] [facility] [address] [facility]

## 2023-09-22 ENCOUNTER — TELEPHONE (OUTPATIENT)
Dept: ENDOCRINOLOGY CLINIC | Facility: CLINIC | Age: 68
End: 2023-09-22

## 2023-09-22 DIAGNOSIS — E11.9 TYPE 2 DIABETES MELLITUS WITHOUT COMPLICATION, WITH LONG-TERM CURRENT USE OF INSULIN (HCC): Primary | ICD-10-CM

## 2023-09-22 DIAGNOSIS — Z79.4 TYPE 2 DIABETES MELLITUS WITHOUT COMPLICATION, WITH LONG-TERM CURRENT USE OF INSULIN (HCC): Primary | ICD-10-CM

## 2023-09-22 NOTE — TELEPHONE ENCOUNTER
Received fax from [facility] 72 is not covered. Contacted pharmacy to confirm, not on formulary, and no alternatives were provided. Try baqsimi or different glucagon pen?

## 2023-09-22 NOTE — TELEPHONE ENCOUNTER
New prescription for glucagon sent to local pharmacy due to insurance not covering [address].     Rene Wolfe APRN, BC-ADM, [facility]

## 2023-10-01 NOTE — TELEPHONE ENCOUNTER
I was able to get pt. enrolled in a $35/month cost for Humalog  Through [facility] pt assistance. I have pended it for signature. I will dc the Novolog order. Thanks, [employer]
Thanks [employer]
generally intact

## 2023-10-11 ENCOUNTER — TELEPHONE (OUTPATIENT)
Dept: ENDOCRINOLOGY CLINIC | Facility: CLINIC | Age: 68
End: 2023-10-11

## 2023-10-11 NOTE — TELEPHONE ENCOUNTER
Pt called in stating her blood sugar has been high. Asymptomatic. Reviewed meds from last visit on 09/21/2023. Gib Laughter was increased to 64 units once daily  (pt reports she was only giving 62 some days). Humalog was increased from sliding scale to 2 units plus scale with breakfast and 4 units plus scale with dinner. Pt stated she didn't really understand the scale so we reviewed it until she felt more comfortable. Pt states she's been so high she has occasionally given 14 units with dinner. Pt agreed to use humalog scale dosing and keep Tresiabe at 64 units as directed. Scheduled visit Monday in [place] for [address] upload. Pt denies dietary and med changes. Has some stressors including her BG trends, as well as a \"spur\" under toe nail which is causing her some pain. Notified Deanna of visit next Monday to upload dexcom and send to [facility] for review. Pt to contact office with any concerns. Hyperglycemia triage:   Confirm current Diabetes medications with patient (not from chart note)   Onset, frequency and duration of symptoms? Recent illness or steroid prescription/injection? Obtain Blood sugar readings: BG log or CGM? Changes in diet? Changes in any medications?

## 2023-10-12 NOTE — TELEPHONE ENCOUNTER
Noted, will review CGM data on Monday and contact patient with any medication adjustments as needed.     Blnak UNGER, BC-ADM, [facility]

## 2023-10-16 ENCOUNTER — NURSE ONLY (OUTPATIENT)
Dept: ENDOCRINOLOGY CLINIC | Facility: CLINIC | Age: 68
End: 2023-10-16
Payer: MEDICARE

## 2023-10-16 DIAGNOSIS — Z79.4 TYPE 2 DIABETES MELLITUS WITHOUT COMPLICATION, WITH LONG-TERM CURRENT USE OF INSULIN (HCC): Primary | ICD-10-CM

## 2023-10-16 DIAGNOSIS — E11.9 TYPE 2 DIABETES MELLITUS WITHOUT COMPLICATION, WITH LONG-TERM CURRENT USE OF INSULIN (HCC): Primary | ICD-10-CM

## 2023-10-16 NOTE — PROGRESS NOTES
Afua Dacosta  [id number] was seen for Continuous Glucose Sensor Follow-up Visit:    Date: 10/16/2023  Referring Provider: AVERY Garcia  Start time: 11am End time: 11:30am    Sensor Type: Dexcom G7- using     Reviewed CGMS download and patient logs:  Days of sensor use: 100%  CGM Analysis of data: dates  9/8/23-9/21/23   Sensor download: full report in media  Average glucose : 241 mg/dl     GMI (Glucose Management Indicator):9.1%  Coefficient of variation: 26.5%    39% time above 180mg /dl   44% time above 250 mg/dl  17% time in target range:  mg/dl   0% time below target range: 70mg/dl     Food/activity diary findings:   Pt. has noticed elevated BG readings since stopping Metformin. Her sugars drop while she is working ;takes snacks with her & drinks protein shake to prevent low blood sugar    Current diabetes medications: Toujeo 64 units daily  Humalog 2 units +scale with breakfast & 4 units with dinner +scale  200-250 mg/dl = 2 units, 251-300 mg/dl = 4 units, 301-350 mg/dl = 6 units, > 350 mg/dl = 8 units injection with breakfast and dinner and 4 units + sliding scale with dinner. Plan: CGMS download- color copy sent to scan. Download forwarded to provider. Patient verbalized understanding and has no further questions at this time.     Rody Quesada RN, [facility]

## 2023-10-23 RX ORDER — METOPROLOL TARTRATE 50 MG/1
TABLET, FILM COATED ORAL
Qty: 180 TABLET | Refills: 0 | Status: SHIPPED | OUTPATIENT
Start: 2023-10-23

## 2023-10-23 NOTE — TELEPHONE ENCOUNTER
Metoprolol Tartrate 50 MG oral tab    Hypertension Medications Protocol Ttqovp19/21/2023 12:06 PM   Protocol Details CMP or BMP in past 12 months    Last serum creatinine< 2.0    Appointment in past 6 or next 3 months        Last office visit:  8/1/23    Future Appointments   Date Time Provider Gladis Sutton   12/7/2023  8:00 AM CORNEL Douglas [facility] Zenaida Lambert   Last filled:  7/3/23  #180 with 0 refills   Last labs:  7/18/23  Crea:  0.77

## 2023-11-14 DIAGNOSIS — E11.9 TYPE 2 DIABETES MELLITUS WITHOUT COMPLICATION, WITH LONG-TERM CURRENT USE OF INSULIN (HCC): ICD-10-CM

## 2023-11-14 DIAGNOSIS — I10 ESSENTIAL HYPERTENSION: ICD-10-CM

## 2023-11-14 DIAGNOSIS — Z79.4 TYPE 2 DIABETES MELLITUS WITHOUT COMPLICATION, WITH LONG-TERM CURRENT USE OF INSULIN (HCC): ICD-10-CM

## 2023-11-14 RX ORDER — LISINOPRIL 10 MG/1
10 TABLET ORAL DAILY
Qty: 90 TABLET | Refills: 0 | Status: SHIPPED | OUTPATIENT
Start: 2023-11-14

## 2023-11-14 NOTE — TELEPHONE ENCOUNTER
Last refill: 08/25/23  Qty: 90  W/ 0 refills  Last ov: 03/21/23    Requested Prescriptions     Pending Prescriptions Disp Refills    LISINOPRIL 10 MG Oral Tab [Pharmacy Med Name: Lisinopril 10 MG Oral Tablet] 90 tablet 0     Sig: Take 1 tablet by mouth once daily     Future Appointments   Date Time Provider Gladis Sutton   12/7/2023  8:00 AM CORNEL Gaming [facility] Luis Fox

## 2023-12-07 ENCOUNTER — OFFICE VISIT (OUTPATIENT)
Dept: ENDOCRINOLOGY CLINIC | Facility: CLINIC | Age: 68
End: 2023-12-07
Payer: MEDICARE

## 2023-12-07 ENCOUNTER — TELEPHONE (OUTPATIENT)
Dept: ENDOCRINOLOGY CLINIC | Facility: CLINIC | Age: 68
End: 2023-12-07

## 2023-12-07 VITALS
OXYGEN SATURATION: 96 % | DIASTOLIC BLOOD PRESSURE: 50 MMHG | HEART RATE: 63 BPM | WEIGHT: 187 LBS | BODY MASS INDEX: 34 KG/M2 | RESPIRATION RATE: 20 BRPM | SYSTOLIC BLOOD PRESSURE: 122 MMHG

## 2023-12-07 DIAGNOSIS — E66.09 CLASS 1 OBESITY DUE TO EXCESS CALORIES WITH SERIOUS COMORBIDITY AND BODY MASS INDEX (BMI) OF 34.0 TO 34.9 IN ADULT: ICD-10-CM

## 2023-12-07 DIAGNOSIS — E11.9 TYPE 2 DIABETES MELLITUS WITHOUT COMPLICATION, WITH LONG-TERM CURRENT USE OF INSULIN (HCC): Primary | ICD-10-CM

## 2023-12-07 DIAGNOSIS — I10 PRIMARY HYPERTENSION: ICD-10-CM

## 2023-12-07 DIAGNOSIS — Z79.4 TYPE 2 DIABETES MELLITUS WITHOUT COMPLICATION, WITH LONG-TERM CURRENT USE OF INSULIN (HCC): Primary | ICD-10-CM

## 2023-12-07 LAB
CARTRIDGE LOT#: 642 NUMERIC
CREAT UR-SCNC: 107 MG/DL
HEMOGLOBIN A1C: 9.2 % (ref 4.3–5.6)
MICROALBUMIN UR-MCNC: 11.3 MG/DL
MICROALBUMIN/CREAT 24H UR-RTO: 105.6 UG/MG (ref ?–30)

## 2023-12-07 PROCEDURE — 82570 ASSAY OF URINE CREATININE: CPT | Performed by: NURSE PRACTITIONER

## 2023-12-07 PROCEDURE — 99214 OFFICE O/P EST MOD 30 MIN: CPT | Performed by: NURSE PRACTITIONER

## 2023-12-07 PROCEDURE — 82043 UR ALBUMIN QUANTITATIVE: CPT | Performed by: NURSE PRACTITIONER

## 2023-12-07 PROCEDURE — 83036 HEMOGLOBIN GLYCOSYLATED A1C: CPT | Performed by: NURSE PRACTITIONER

## 2023-12-07 PROCEDURE — 95251 CONT GLUC MNTR ANALYSIS I&R: CPT | Performed by: NURSE PRACTITIONER

## 2023-12-07 NOTE — PATIENT INSTRUCTIONS
We are here to support you with Diabetes but please remember that you still need your primary care doctor for your routine health maintenance. Your current A1C: 9.2%  This test provides us with your average blood sugar for the past 3 months. The main goal of diabetes treatment is to keep your sugar from going too high. We measure your overall blood sugar trends with a Hemoglobin A1C test. (also called an A1C)  For most people the target is less than 7.0% but sometimes we make exceptions based on age, health history and other factors. Keeping an A1C less than 7% helps prevents diabetes related health problems. If your A1C goes too high, then we need to talk about changing your current diabetes treatment. MEDICATIONS:   It is important to take all of your medications as prescribed. Please call me if you cannot get the prescriptions filled or are having issues with refills. Also, please call me if you have any issues with medication questions, side effects, dosing questions or problems with your blood sugar trends BEFORE CHANGING OR STOPPING ANY MEDICATIONS. Increase Toujeo to 66 units injection daily  Increase Humalog 6 units + sliding scale 3x/day 10-15 minutes BEFORE meals  You will need to check your blood sugar before each meal.  Please follow the scale below for your insulin dose: If blood sugar is 200-250, add 2 units of Humalog  If blood sugar is 251-300, add 4 units of Humalog  If blood sugar is 301-350, add 6 units of Humalog  If blood sugar is > 350, add 8 units of Humalog      Blood sugar testing:   Always bring your glucose meter or blood sugar logbook to every appointment here at the diabetes center. This allows me to safely make adjustments to your diabetes plan.    In order for me to determine any patterns in your blood sugars, you will need to continue to use personal Dexcom CGM or test your blood sugar 3 times daily   Call if 2 readings below 80 mg/dl in 1 week for medication adjustment. Blood sugar targets:  Before breakfast:   (preferably < 110)  2 hours After meals: less than 180 (preferably less than 150)   Call for persistent blood sugars < 75 or > 200. Blood sugars greater than 200 are not acceptable to reach your goal of improving diabetes      Health Maintenance:   1. LABS: It is important to monitor your kidney function (blood and urine protein levels) , liver function tests and cholesterol levels when you have diabetes. 2. FOOT EXAMS:  daily foot inspections for foot wounds or skin changes are important for foot care. Any unusual changes should be reported immediately. 3. EYE EXAMS: Checking your eyes for diabetes changes is important and you should have a dilated eye exam done by an eye doctor EVERY year since these changes occur in the BACK of the eye and not visible by you. Please let me know if you need provider list for eye doctor. Lifestyle Therapy:    1. NUTRITION: Maintain optimal weight, calorie restriction if overweight, plant-based diet    2. PHYSICAL ACTIVITY: 150 minutes per week (30 minutes a day 5 days a week) of moderate exertion such as walking, stair climbing. Include strength training 2-3 times per week. Increase as tolerated. 3. SLEEP: Try and get 7-8 hours of sleep per night    4. BEHAVIOR:  Tobacco cessation and alcohol in moderation      Reminders:  Due for dilated eye exam  Have labs done one week before next office visit with [address] [facility], [facility], [facility]  [address], [address], [address], [address] [facility], [address]  [address], [address]  Diabetes Services at [address] [facility] [address]

## 2024-01-17 ENCOUNTER — TELEPHONE (OUTPATIENT)
Dept: FAMILY MEDICINE CLINIC | Facility: CLINIC | Age: 69
End: 2024-01-17

## 2024-01-17 NOTE — TELEPHONE ENCOUNTER
LOV    LAST LAB:    LAST RX:  Medication Quantity Refills Start End   METOPROLOL TARTRATE 50 MG Oral Tab 180 tablet 0 10/23/2023 --   Sig:   Take 1 tablet by mouth twice daily         Next OV:   Future Appointments   Date Time Provider Department Center   1/18/2024  8:15 AM EM[facility] DI[facility] [id number] NURSE EMGDIABCTRYK [facility] YRK   1/19/2024  8:40 AM [facility]   1/31/2024  9:30 AM Christine Hylton RD [facility]   3/7/2024  8:45 AM Amy Velez APRN [facility]CTRYK [facility] YRK          PROTOCOL  Hypertension Medications Protocol [id number]/17/2024 09:16 AM   Protocol Details CMP or BMP in past 12 months    Last serum creatinine< 2.0    Appointment in past 6 or next 3 months

## 2024-01-20 RX ORDER — METOPROLOL TARTRATE 50 MG/1
TABLET, FILM COATED ORAL
Qty: 180 TABLET | Refills: 0 | Status: SHIPPED | OUTPATIENT
Start: 2024-01-20

## 2024-01-29 ENCOUNTER — MED REC SCAN ONLY (OUTPATIENT)
Dept: ENDOCRINOLOGY CLINIC | Facility: CLINIC | Age: 69
End: 2024-01-29

## 2024-01-29 ENCOUNTER — HOSPITAL ENCOUNTER (OUTPATIENT)
Dept: MAMMOGRAPHY | Age: 69
Discharge: HOME | End: 2024-01-29
Attending: INTERNAL MEDICINE
Payer: MEDICARE

## 2024-01-29 ENCOUNTER — NURSE ONLY (OUTPATIENT)
Dept: ENDOCRINOLOGY CLINIC | Facility: CLINIC | Age: 69
End: 2024-01-29
Payer: MEDICARE

## 2024-01-29 DIAGNOSIS — Z79.4 TYPE 2 DIABETES MELLITUS WITH HYPERGLYCEMIA, WITH LONG-TERM CURRENT USE OF INSULIN (HCC): Primary | ICD-10-CM

## 2024-01-29 DIAGNOSIS — E11.65 TYPE 2 DIABETES MELLITUS WITH HYPERGLYCEMIA, WITH LONG-TERM CURRENT USE OF INSULIN (HCC): Primary | ICD-10-CM

## 2024-01-29 DIAGNOSIS — Z12.31 ENCOUNTER FOR SCREENING MAMMOGRAM FOR MALIGNANT NEOPLASM OF BREAST: ICD-10-CM

## 2024-01-29 PROCEDURE — 77067 SCR MAMMO BI INCL CAD: CPT | Performed by: INTERNAL MEDICINE

## 2024-01-29 PROCEDURE — 77063 BREAST TOMOSYNTHESIS BI: CPT | Performed by: INTERNAL MEDICINE

## 2024-01-30 ENCOUNTER — TELEPHONE (OUTPATIENT)
Dept: ENDOCRINOLOGY CLINIC | Facility: CLINIC | Age: 69
End: 2024-01-30

## 2024-01-30 NOTE — TELEPHONE ENCOUNTER
Personal  Dexcom CGM  Analysis of data: 1/16/2024  - 1/29/2024  % Time CGM is Active: 96.9%  Sensor download: full report  in media  Average glucose : 261 mg/dl     Standard deviation: 69 mg/dl   CV (coefficient of variation) : 26.6%     33% time above 180mg /dl   54% time above 250 mg/dl  13% time in target range:  mg/dl   0% time below target range: 70mg/dl     Evaluation   1. Baseline hyperglycemia  2. Evening postprandial excursion resulting in overnight hyperglycemia  3. Increased glucose variability  4. Low likelihood of hypoglycemia    PLAN: Increase Toujeo to 70 units injection daily and increase Humalog to 8 units + sliding scale with breakfast and lunch and increase Humalog to 10 units + sliding scale: 200-250 mg/dl = 2 units, 251-300 mg/dl = 4 units, 301-350 mg/dl = 6 units, > 350 mg/dl = 8 units tid w/meals.  Reinforced avoiding commonly used sites due to lipohypertrophy and discussed alternate site selection.  Patient verbalizes understanding and has no further questions at this time.    Amy UNGER, BC-ADM, [facility]

## 2024-01-30 NOTE — PROGRESS NOTES
Pt. brought Dexcom  for download to clinic  Analysis of data: 1/16/2024 - 1/29/2024  % Time CGM is Active: 93 %  (previous 96.3%)  Sensor download: full report  in media  Average glucose : 261 mg/dl  (previous 243 mg/dl)     Standard deviation: 69 mg/dl  (previous 60 mg/dl )  CV (coefficient of variation) : 26.6 %  (previous 24.6% )     33% time above 180mg /dl (previous 45%)  54% time above 250 mg/dl  (previous 42%)  13% time in target range:  mg/dl (previous 14%)  0% time below target range: 70mg/dl     Current medications:  Toujeo to 66 units injection daily   Humalog to 6 units + sliding scale: 200-250 mg/dl = 2 units, 251-300 mg/dl = 4 units, 301-350 mg/dl = 6 units, > 350 mg/dl = 8 units injection w/meals.  Reminded to give 10-15 minutes before eating to prevent postprandial elevations  Plan:   Will e-mail scan report to APN for review;will contact you with any changes in medications  Copy sent to scanning  Follow-up with Christine Hylton RD,[facility]  Scheduled for download in 2 wks

## 2024-02-11 DIAGNOSIS — E11.9 TYPE 2 DIABETES MELLITUS WITHOUT COMPLICATION, WITH LONG-TERM CURRENT USE OF INSULIN (HCC): ICD-10-CM

## 2024-02-11 DIAGNOSIS — Z79.4 TYPE 2 DIABETES MELLITUS WITHOUT COMPLICATION, WITH LONG-TERM CURRENT USE OF INSULIN (HCC): ICD-10-CM

## 2024-02-11 DIAGNOSIS — I10 ESSENTIAL HYPERTENSION: ICD-10-CM

## 2024-02-12 RX ORDER — LISINOPRIL 10 MG/1
10 TABLET ORAL DAILY
Qty: 90 TABLET | Refills: 0 | Status: SHIPPED | OUTPATIENT
Start: 2024-02-12

## 2024-02-19 ENCOUNTER — TELEPHONE (OUTPATIENT)
Dept: ENDOCRINOLOGY CLINIC | Facility: CLINIC | Age: 69
End: 2024-02-19

## 2024-02-19 DIAGNOSIS — E11.65 TYPE 2 DIABETES MELLITUS WITH HYPERGLYCEMIA, WITH LONG-TERM CURRENT USE OF INSULIN (HCC): Primary | ICD-10-CM

## 2024-02-19 DIAGNOSIS — Z79.4 TYPE 2 DIABETES MELLITUS WITH HYPERGLYCEMIA, WITH LONG-TERM CURRENT USE OF INSULIN (HCC): Primary | ICD-10-CM

## 2024-02-19 RX ORDER — GLUCAGON INJECTION, SOLUTION 1 MG/.2ML
1 INJECTION, SOLUTION SUBCUTANEOUS ONCE AS NEEDED
Qty: 0.2 ML | Refills: 0 | Status: SHIPPED | OUTPATIENT
Start: 2024-02-19 | End: 2024-02-19

## 2024-02-19 NOTE — TELEPHONE ENCOUNTER
Pt. here for  G7 download:  CGM Analysis of data: dates  2/6/24-2/19/24   Sensor download: full report in media  Average glucose : 244 mg/dl     SD:71%  COEFFICIENT OF VARIATION: 29.0%    36% time above 180mg /dl   44% time above 250 mg/dl  20% time in target range:  mg/dl   0% time below target range: 70mg/dl     Asked if she forgets to give her Humalog before meals;states that she does forget sometimes. Discussed ways to remind herself.    Current diabetes medications:   Toujeo 70 units daily   Humalog to 8 units + sliding scale with breakfast and lunch and increase Humalog to 10 units + sliding scale: 200-250 mg/dl = 2 units, 251-300 mg/dl = 4 units, 301-350 mg/dl = 6 units, > 350 mg/dl = 8 units tid w/meals.     Plan:  Copy of report e-mail scanned to AVERY Garica for review .  She has an appt. W/GEORGIA Hylton RD,[facility] on 3/5/24 to have it downloaded again for insulin adjustments.

## 2024-02-19 NOTE — TELEPHONE ENCOUNTER
Dexcom report reviewed.  Patient to increase Toujeo to 76 units injection daily.  Increase Humalog to 10 units + sliding scale:  200-250 mg/dl = 2 units, 251-300 mg/dl = 4 units, 301-350 mg/dl = 6 units, > 350 mg/dl = 8 units with breakfast and lunch and increase to 14 units + sliding scale with dinner.  Please review importance of consistency with administration of insulin and appropriate timing of insulin.  Review signs and symptoms of hypoglycemia and treatment using rule of 15.  Prescription for Gvoke sent to pharmacy.  Patient to contact office if two readings below 80 mg/dl in one week for adjustment.  Patient to follow up with dietitian as scheduled on 3/5/2024 as scheduled.    Amy UNGER, BC-ADM, [facility]

## 2024-02-22 NOTE — TELEPHONE ENCOUNTER
Pt called in stating she has been playing phone tag with Deanna and needed information on her insulin dosing.   Reviewed Amy UNGER medication adjustments and scale.  Pt had no further questions.

## 2024-02-23 ENCOUNTER — LABORATORY ENCOUNTER (OUTPATIENT)
Dept: LAB | Age: 69
End: 2024-02-23
Attending: NURSE PRACTITIONER
Payer: COMMERCIAL

## 2024-02-23 DIAGNOSIS — E11.9 TYPE 2 DIABETES MELLITUS WITHOUT COMPLICATION, WITH LONG-TERM CURRENT USE OF INSULIN (HCC): ICD-10-CM

## 2024-02-23 DIAGNOSIS — Z79.4 TYPE 2 DIABETES MELLITUS WITHOUT COMPLICATION, WITH LONG-TERM CURRENT USE OF INSULIN (HCC): ICD-10-CM

## 2024-02-23 LAB
ALBUMIN SERPL-MCNC: 3.8 G/DL (ref 3.4–5)
ALBUMIN/GLOB SERPL: 1 {RATIO} (ref 1–2)
ALP LIVER SERPL-CCNC: 76 U/L
ALT SERPL-CCNC: 32 U/L
ANION GAP SERPL CALC-SCNC: 2 MMOL/L (ref 0–18)
AST SERPL-CCNC: 25 U/L (ref 15–37)
BILIRUB SERPL-MCNC: 0.4 MG/DL (ref 0.1–2)
BUN BLD-MCNC: 20 MG/DL (ref 9–23)
CALCIUM BLD-MCNC: 9.4 MG/DL (ref 8.5–10.1)
CHLORIDE SERPL-SCNC: 107 MMOL/L (ref 98–112)
CHOLEST SERPL-MCNC: 146 MG/DL (ref ?–200)
CO2 SERPL-SCNC: 30 MMOL/L (ref 21–32)
CREAT BLD-MCNC: 0.7 MG/DL
CREAT UR-SCNC: 186 MG/DL
EGFRCR SERPLBLD CKD-EPI 2021: 95 ML/MIN/1.73M2 (ref 60–?)
EST. AVERAGE GLUCOSE BLD GHB EST-MCNC: 243 MG/DL (ref 68–126)
FASTING PATIENT LIPID ANSWER: YES
FASTING STATUS PATIENT QL REPORTED: YES
GLOBULIN PLAS-MCNC: 3.8 G/DL (ref 2.8–4.4)
GLUCOSE BLD-MCNC: 118 MG/DL (ref 70–99)
HBA1C MFR BLD: 10.1 % (ref ?–5.7)
HDLC SERPL-MCNC: 57 MG/DL (ref 40–59)
LDLC SERPL CALC-MCNC: 78 MG/DL (ref ?–100)
MICROALBUMIN UR-MCNC: 18.2 MG/DL
MICROALBUMIN/CREAT 24H UR-RTO: 97.8 UG/MG (ref ?–30)
NONHDLC SERPL-MCNC: 89 MG/DL (ref ?–130)
OSMOLALITY SERPL CALC.SUM OF ELEC: 292 MOSM/KG (ref 275–295)
POTASSIUM SERPL-SCNC: 4.3 MMOL/L (ref 3.5–5.1)
PROT SERPL-MCNC: 7.6 G/DL (ref 6.4–8.2)
SODIUM SERPL-SCNC: 139 MMOL/L (ref 136–145)
TRIGL SERPL-MCNC: 48 MG/DL (ref 30–149)
VLDLC SERPL CALC-MCNC: 7 MG/DL (ref 0–30)

## 2024-02-23 PROCEDURE — 80061 LIPID PANEL: CPT

## 2024-02-23 PROCEDURE — 80053 COMPREHEN METABOLIC PANEL: CPT

## 2024-02-23 PROCEDURE — 83036 HEMOGLOBIN GLYCOSYLATED A1C: CPT

## 2024-02-23 PROCEDURE — 82570 ASSAY OF URINE CREATININE: CPT

## 2024-02-23 PROCEDURE — 82043 UR ALBUMIN QUANTITATIVE: CPT

## 2024-03-05 ENCOUNTER — DIABETIC EDUCATION (OUTPATIENT)
Dept: ENDOCRINOLOGY CLINIC | Facility: CLINIC | Age: 69
End: 2024-03-05
Payer: MEDICARE

## 2024-03-05 VITALS — BODY MASS INDEX: 34 KG/M2 | WEIGHT: 185 LBS

## 2024-03-05 DIAGNOSIS — E11.9 TYPE 2 DIABETES MELLITUS WITHOUT COMPLICATION, WITH LONG-TERM CURRENT USE OF INSULIN (HCC): Primary | ICD-10-CM

## 2024-03-05 DIAGNOSIS — Z79.4 TYPE 2 DIABETES MELLITUS WITHOUT COMPLICATION, WITH LONG-TERM CURRENT USE OF INSULIN (HCC): Primary | ICD-10-CM

## 2024-03-05 PROCEDURE — 97802 MEDICAL NUTRITION INDIV IN: CPT | Performed by: DIETITIAN, REGISTERED

## 2024-03-05 NOTE — PATIENT INSTRUCTIONS
Make sure you are taking 76 units for your Toujeo.  Keep the Humalog pen with you to act as a reminder to take it before your meal.    Goals to work towards:    Aim to follow a meal plan with a consistent amount of carbohydrates for meals and snacks:  Goal for meals is 30-45 grams of carbohydrates for each meal (3 meals per day)  Goal for snacks is 15 grams of carbohydrates for each snack (1-2 per day)    Aim to include carbohydrates plus protein with meals and snacks    You can also use the Plate Method as a model for your meals:  1/2 of your plate is non-starchy vegetables, 1/4 of your plate is lean protein, 1/4 of your plate is starchy or carbohydrate foods    Use measuring cups for 1 week to check in on your serving sizes.

## 2024-03-05 NOTE — PROGRESS NOTES
Lillian CORONADO Sohail 3/6/1955 was seen for individual Diabetic Medical Nutrition Therapy- an initial consult:    Date: 3/5/2024   Referral: Sammie Whitfield MD Start time 8:30 am  End time: 9:30 am    68 year old female presents for medical nutrition therapy for type 2 diabetes.      Anthropometrics:  Wt Readings from Last 6 Encounters:   12/07/23 187 lb   09/21/23 188 lb   08/12/23 187 lb   07/17/23 187 lb   03/21/23 185 lb 2 oz   01/26/23 184 lb         Current diabetes medications:  Insulin:  Toujeo 76 units daily (notes she may have been taking less than 76 units)  Humalog 10 units with breakfast and lunch, 14 units with dinner  200-250 mg/dl = 2 units  251-300 mg/dl = 4 units  301-350 mg/dl = 6 units  > 350 mg/dl = 8 units  Notes she sometimes misses dose with meals.    Injection sites: arms, abdomen, upper buttocks, thigh - rotates within different areas . Notes sometimes has bruising and avoids areas. Also watches for areas or lumping and avoids those areas as well.      Labs:  Lab Results   Component Value Date    A1C 10.1 (H) 02/23/2024    A1C 9.2 (A) 12/07/2023    CHOLEST 146 02/23/2024    CHOLEST 133 03/21/2023    LDL 78 02/23/2024    LDL 62 03/21/2023    HDL 57 02/23/2024    HDL 51 03/21/2023    NONHDLC 89 02/23/2024    NONHDLC 82 03/21/2023    TRIG 48 02/23/2024    TRIG 111 03/21/2023    BUN 20 02/23/2024    BUN 15 07/18/2023    CREATSERUM 0.70 02/23/2024    CREATSERUM 0.77 07/18/2023    GFRNAA 99 12/08/2021    GFRNAA 87 03/22/2021    GFRAA 115 12/08/2021    GFRAA 101 03/22/2021       Lab Results   Component Value Date    A1C 10.1 (H) 02/23/2024    A1C 9.2 (A) 12/07/2023    A1C 9.6 (H) 07/18/2023       Glucose testing at home:     Reviewed CGMS download and patient logs:    CGM download:  Period of 2/21/24-3/5/24  Sensor type: Dexcom G7  Standard deviation/Coefficient of variation: 33.7%  Glucose Management indicator (GMI): 8.3%    Average glucose (mg/dL): 210     Glucose Time in Range:  26%  Very high  glucose targets: (> 250mg/dl)   35% High glucose targets: (> 180mg/dl)    39% In target range:  (70-180mg/dl)      0% Low glucose targets:  (less than 70mg/dl)        0% Very Low glucose targets: (< 54mg/dl )       Pattern of hyperglycemia - after lunch, in evening and carries through to morning  CGM report reviewed/printed and submitted to be scanned into chart.       Diet History:  Usually eats 2-3 meals/day (mainly lunch and dinner) and sometimes includes snacks. Works from 1 pm-6:30 pm, eats meal before work. Would like assistance with snack ideas.  (a.m.) Can include eggs, keto bread. Sometimes has Glucerna.  (mid-day) [place] (turkey, ham, cheese, may, radishes on keto bread)  (p.m.) Tries to include salad, meat, less potatoes and pasta. Notes meal can be late (preps after getting home from work).  (snacks) Can include sugar free jello w/cool whip, sugar free vanilla pudding, sometimes has chips, pistachios  (Beverages) Water, sometimes has decaf coffee in morning      Nutrition Diagnosis  PES: Altered nutrition related laboratory values related to knowledge deficit as evidenced by Hyperglycemia and elevated HBA1c      Intervention  -Comprehensive Nutrition Education Provided:  Reviewed carbohydrate counting and label reading.  Recommended carbohydrate targets of 30-45 grams at meals and 15 grams at snacks.  Provided suggestions for carbohydrate controlled snacks.  Reviewed the MyPlate method with focus on balanced macronutrient consumption; identifying foods that are carbohydrates, lean protein, non-starchy vegetables, and heart healthy fats.        Monitoring/Evaluation  Diet modification/understanding  Medication compliance/understanding  Blood sugars  Hemoglobin A1c      Recommendations  Practice healthful eating patterns, emphasizing a variety of nutrient dense foods in appropriate portion sizes.    Monitor carbohydrate intake with the MyPlate method   Practice carbohydrate counting and label  reading  Improve glycemic control working towards an A1c of 7.0% or less  Continue testing blood glucose with meter/CGM working towards a time in range of 70% or higher.  Make sure to check that dose for Toujeo is at increased dose of 76 units  Keep the Humalog pen nearby to act as a reminder to take it before your meal.      Patient Specific Goals:  Make sure you are taking 76 units for your Toujeo.  Keep the Humalog pen with you to act as a reminder to take it before your meal.    Goals to work towards:    Aim to follow a meal plan with a consistent amount of carbohydrates for meals and snacks:  Goal for meals is 30-45 grams of carbohydrates for each meal (3 meals per day)  Goal for snacks is 15 grams of carbohydrates for each snack (1-2 per day)    Aim to include carbohydrates plus protein with meals and snacks    You can also use the Plate Method as a model for your meals:  1/2 of your plate is non-starchy vegetables, 1/4 of your plate is lean protein, 1/4 of your plate is starchy or carbohydrate foods    Use measuring cups for 1 week to check in on your serving sizes.      Follow up: 6/4/2024 with KANE Walden RDN, LDN, [facility]

## 2024-03-07 ENCOUNTER — TELEPHONE (OUTPATIENT)
Dept: ENDOCRINOLOGY CLINIC | Facility: CLINIC | Age: 69
End: 2024-03-07

## 2024-03-07 NOTE — TELEPHONE ENCOUNTER
Patient had appt with  in [facility] location today, pt did no show Last A1c value was 10.1% done 2/23/2024.   would like patient to establish care with Endo called patient to inform LVM about canceling appt and pt needing to reach Endo office to schedule appt. Pt is to contact [phone] to schedule an appt.

## 2024-04-15 RX ORDER — METOPROLOL TARTRATE 50 MG/1
TABLET, FILM COATED ORAL
Qty: 180 TABLET | Refills: 0 | Status: SHIPPED | OUTPATIENT
Start: 2024-04-15

## 2024-04-15 NOTE — TELEPHONE ENCOUNTER
Last refill: 01/20/24  qtY: 180  W/ 0 refills  Last ov: 08/12/22    Requested Prescriptions     Pending Prescriptions Disp Refills    METOPROLOL TARTRATE 50 MG Oral Tab [Pharmacy Med Name: Metoprolol Tartrate 50 MG Oral Tablet] 180 tablet 0     Sig: Take 1 tablet by mouth twice daily     Future Appointments   Date Time Provider Department Center   4/17/2024 10:15 AM Sammie Whitfield MD [facility] [place]   5/15/2024  7:00 AM Lyndon Carolina MD [facility] SUB GI   6/4/2024  1:00 PM Christine Hylton RD [facility]

## 2024-04-17 ENCOUNTER — OFFICE VISIT (OUTPATIENT)
Dept: FAMILY MEDICINE CLINIC | Facility: CLINIC | Age: 69
End: 2024-04-17
Payer: MEDICARE

## 2024-04-17 VITALS
BODY MASS INDEX: 34.07 KG/M2 | HEART RATE: 61 BPM | TEMPERATURE: 98 F | RESPIRATION RATE: 16 BRPM | SYSTOLIC BLOOD PRESSURE: 124 MMHG | OXYGEN SATURATION: 97 % | DIASTOLIC BLOOD PRESSURE: 66 MMHG | HEIGHT: 62 IN | WEIGHT: 185.13 LBS

## 2024-04-17 DIAGNOSIS — Z79.4 TYPE 2 DIABETES MELLITUS WITH HYPERGLYCEMIA, WITH LONG-TERM CURRENT USE OF INSULIN (HCC): ICD-10-CM

## 2024-04-17 DIAGNOSIS — J30.1 SEASONAL ALLERGIC RHINITIS DUE TO POLLEN: ICD-10-CM

## 2024-04-17 DIAGNOSIS — E11.65 TYPE 2 DIABETES MELLITUS WITH HYPERGLYCEMIA, WITH LONG-TERM CURRENT USE OF INSULIN (HCC): ICD-10-CM

## 2024-04-17 DIAGNOSIS — Z86.010 PERSONAL HISTORY OF COLONIC POLYPS: ICD-10-CM

## 2024-04-17 DIAGNOSIS — Z78.0 POSTMENOPAUSAL: ICD-10-CM

## 2024-04-17 DIAGNOSIS — I10 PRIMARY HYPERTENSION: ICD-10-CM

## 2024-04-17 DIAGNOSIS — Z00.00 ENCOUNTER FOR ANNUAL HEALTH EXAMINATION: Primary | ICD-10-CM

## 2024-04-17 NOTE — PATIENT INSTRUCTIONS
Lillian Sauceda's SCREENING SCHEDULE   Tests on this list are recommended by your physician but may not be covered, or covered at this frequency, by your insurer.   Please check with your insurance carrier before scheduling to verify coverage.   PREVENTATIVE SERVICES FREQUENCY &  COVERAGE DETAILS LAST COMPLETION DATE   Diabetes Screening    Fasting Blood Sugar /  Glucose    One screening every 12 months if never tested or if previously tested but not diagnosed with pre-diabetes   One screening every 6 months if diagnosed with pre-diabetes Lab Results   Component Value Date     (H) 02/23/2024        Cardiovascular Disease Screening    Lipid Panel  Cholesterol  Lipoprotein (HDL)  Triglycerides Covered every 5 years for all Medicare beneficiaries without apparent signs or symptoms of cardiovascular disease Lab Results   Component Value Date    CHOLEST 146 02/23/2024    HDL 57 02/23/2024    LDL 78 02/23/2024    TRIG 48 02/23/2024         Electrocardiogram (EKG)   Covered if needed at Welcome to Medicare, and non-screening if indicated for medical reasons 05/30/2018      Ultrasound Screening for Abdominal Aortic Aneurysm (AAA) Covered once in a lifetime for one of the following risk factors   • Men who are 65-75 years old and have ever smoked   • Anyone with a family history -     Colorectal Cancer Screening  Covered for ages 50-85; only need ONE of the following:    Colonoscopy   Covered every 10 years    Covered every 2 years if patient is at high risk or previous colonoscopy was abnormal 05/13/2019    Health Maintenance   Topic Date Due   • Colorectal Cancer Screening  05/13/2024       Flexible Sigmoidoscopy   Covered every 4 years -    Fecal Occult Blood Test Covered annually -   Bone Density Screening    Bone density screening    Covered every 2 years after age 65 if diagnosed with risk of osteoporosis or estrogen deficiency.    Covered yearly for long-term glucocorticoid medication use (Steroids) No results  found for this or any previous visit.      Health Maintenance Due   Topic Date Due   • DEXA Scan  Never done      Pap and Pelvic    Pap   Covered every 2 years for women at normal risk; Annually if at high risk -  No recommendations at this time    Chlamydia Annually if high risk -  No recommendations at this time   Screening Mammogram    Mammogram     Recommend annually for all female patients aged 40 and older    One baseline mammogram covered for patients aged 35-39 01/29/2024    Health Maintenance   Topic Date Due   • Mammogram  01/29/2025       Immunizations    Influenza Covered once per flu season  Please get every year 10/23/2023  No recommendations at this time    Pneumococcal Each vaccine ([id number] & [id number]) covered once after 65 Prevnar 13: 03/06/2018    [id number]: 03/21/2023     No recommendations at this time    Hepatitis B One screening covered for patients with certain risk factors   -  No recommendations at this time    Tetanus Toxoid Not covered by Medicare Part B unless medically necessary (cut with metal); may be covered with your pharmacy prescription benefits 03/28/2005    Tetanus, Diptheria and Pertusis TD and TDaP Not covered by Medicare Part B -  No recommendations at this time    Zoster Not covered by Medicare Part B; may be covered with your pharmacy  prescription benefits -  Zoster Vaccines(1 of 2) Never done     Diabetes      Hemoglobin A1C Annually; if last result is elevated, may be asked to retest more frequently.    Medicare covers every 3 months Lab Results   Component Value Date     (H) 02/23/2024    A1C 10.1 (H) 02/23/2024       No recommendations at this time    Creat/alb ratio Annually Lab Results   Component Value Date    MICROALBCREA 97.8 (H) 02/23/2024       LDL Annually Lab Results   Component Value Date    LDL 78 02/23/2024       Dilated Eye Exam Annually [unfilled]     Annual Monitoring of Persistent Medications (ACE/ARB, digoxin diuretics,  anticonvulsants)    Potassium Annually Lab Results   Component Value Date    K 4.3 02/23/2024         Creatinine   Annually Lab Results   Component Value Date    CREATSERUM 0.70 02/23/2024         BUN Annually Lab Results   Component Value Date    BUN 20 02/23/2024       Drug Serum Conc Annually No results found for: \"DIGOXIN\", \"DIG\", \"VALP\"

## 2024-04-17 NOTE — PROGRESS NOTES
Subjective:   Lillian Sauceda is a 69 year old female who presents for a Medicare Subsequent Annual Wellness visit (Pt already had Initial Annual Wellness) and scheduled follow up of multiple significant but stable problems.   Pt has an appt for colonoscopy. Feet were just checked by podiatry.  She has painful hands and fingers.     History/Other:   Fall Risk Assessment:   She has been screened for Falls and is low risk.      Cognitive Assessment:   She had a completely normal cognitive assessment - see flowsheet entries     Functional Ability/Status:   Lillian Sauceda has some abnormal functions as listed below:  She has Hearing problems based on screening of functional status.      Depression Screening (PHQ-2/PHQ-9): PHQ-2 SCORE: 0  , done 4/17/2024   Last Aneta Suicide Screening on 4/17/2024 was No Risk.     <5 minutes spent screening and counseling for depression    Advanced Directives:   She does NOT have a Living Will. [Do you have a living will?: No]  She does NOT have a Power of  for Health Care. [Do you have a healthcare power of ?: No]  Discussed Advance Care Planning with patient (and family/surrogate if present). Standard forms made available to patient in After Visit Summary.      Patient Active Problem List   Diagnosis    Type 2 diabetes mellitus with hyperglycemia, with long-term current use of insulin (HCC)    HTN (hypertension)    Allergic rhinitis    Personal history of colonic polyps    Polyp of colon    Diverticulosis of large intestine without perforation or abscess without bleeding    Other hemorrhoids    Benign neoplasm of rectosigmoid junction     Allergies:  She is allergic to radiology contrast iodinated dyes, contrast dye [gadolinium derivatives], augmentin [amoxicillin-pot clavulanate], and erythromycin.    Current Medications:  Outpatient Medications Marked as Taking for the 4/17/24 encounter (Office Visit) with Sammie Whitfield MD   Medication Sig    NON FORMULARY     NON  FORMULARY     Cholecalciferol (VITAMIN D3 OR) Take by mouth.    METOPROLOL TARTRATE 50 MG Oral Tab Take 1 tablet by mouth twice daily    LISINOPRIL 10 MG Oral Tab Take 1 tablet by mouth once daily    Insulin Glargine, 2 Unit Dial, (TOUJEO MAX SOLOSTAR) 300 UNIT/ML Subcutaneous Solution Pen-injector Inject daily as directed up to TDD = 70 units    Insulin Lispro (HUMALOG KWIKPEN) 200 UNIT/ML Subcutaneous Solution Pen-injector Inject 3x/day with meals as directed for up to TDD = 50 units    traZODone 50 MG Oral Tab Take 1 tablet (50 mg total) by mouth nightly.    cetirizine 10 MG Oral Tab Take 1 tablet (10 mg total) by mouth daily.    Loperamide HCl (ANTI-DIARRHEAL OR) Take by mouth.    Clobetasol Propionate 0.05 % External Shampoo WET HAIR, APPLY TO AFFECTED AREAS ON THE SCALP AND MASSAGE WELL. LET SIT FOR 2-3 MINUTES. RINSE WELL. USE IN THE SHOWER 2-3 TIMES A WEEK.    Insulin Pen Needle (PEN NEEDLES) 32G X 4 MM Does not apply Misc 1 each every 7 days.    CONTOUR NEXT TEST In Vitro Strip Test blood sugar 3 times daily    Melatonin 5 MG Oral Tab Take 1 tablet (5 mg total) by mouth nightly as needed (sleep).    Glucose Blood (RELION PRIME TEST) In Vitro Strip Test blood sugar four times daily; fasting and 2 hrs after every meal. Dx: E11.9    ReliOn Lancet Devices 30G Does not apply Misc 1 lancet by Does not apply route 4 (four) times daily.    Ascorbic Acid (VITAMIN C) 1000 MG Oral Tab Take 1 tablet (1,000 mg total) by mouth daily.    Cyanocobalamin (VITAMIN B-12) 2500 MCG Sublingual SL Tab Place under the tongue daily.    triamcinolone 55 MCG/ACT Nasal Aerosol 1 spray by Nasal route as needed.       Medical History:  She  has a past medical history of Allergic rhinitis, Anxiety state, Calculus of kidney, Depression, DM type 2 (diabetes mellitus, type 2) (HCC), Essential hypertension, Psoriasis, Sleep apnea, and Visual impairment.  Surgical History:  She  has a past surgical history that includes hysterectomy;  tonsillectomy; other; colonoscopy; mari localization wire 1 site left (cpt=19281); Breast biopsy (2018); hernia surgery; and mari biopsy stereo nodule 1 site left (cpt=19081).   Family History:  Her family history includes Colon Polyps in her sister; Diabetes in her brother, father, maternal grandmother, mother, paternal grandmother, sister, sister, and sister; Heart Attack in her father and mother; Heart Disorder in her father and mother; Hypertension in her father; Other in her mother; Stroke in her mother and paternal grandmother; Ulcerative Colitis in her paternal grandmother.  Social History:  She  reports that she quit smoking about 22 years ago. Her smoking use included cigarettes. She started smoking about 52 years ago. She has a 30 pack-year smoking history. She has never used smokeless tobacco. She reports that she does not drink alcohol and does not use drugs.    Tobacco:  She smoked tobacco in the past but quit greater than 12 months ago.  Social History     Tobacco Use   Smoking Status Former    Current packs/day: 0.00    Average packs/day: 1 pack/day for 30.0 years (30.0 ttl pk-yrs)    Types: Cigarettes    Start date: 1972    Quit date: 2002    Years since quittin.3   Smokeless Tobacco Never          CAGE Alcohol Screen:   CAGE screening score of 0 on 2024, showing low risk of alcohol abuse.      Patient Care Team:  Sammie Whitfield MD as PCP - General (Family Medicine)  Laury Bocanegra RN, MAUREEN as Registered Nurse (Registered Nurse)  Christine Hylton RD as Dietician (Dietitian)    Review of Systems  GENERAL: feels well otherwise  SKIN: denies any unusual skin lesions  EYES: denies blurred vision or double vision  HEENT: denies nasal congestion, sinus pain or ST  LUNGS: denies shortness of breath with exertion  CARDIOVASCULAR: denies chest pain on exertion  GI: denies abdominal pain, denies heartburn  : denies dysuria, vaginal discharge or itching, no complaint of urinary  incontinence   MUSCULOSKELETAL: denies back pain  NEURO: denies headaches  PSYCHE: denies depression or anxiety  HEMATOLOGIC: denies hx of anemia  ENDOCRINE: denies thyroid history  ALL/ASTHMA: denies hx of allergy or asthma    Objective:   Physical Exam  General Appearance:  Alert, cooperative, no distress, appears stated age   Head:  Normocephalic, without obvious abnormality, atraumatic   Eyes:  PERRL, conjunctiva/corneas clear, EOM's intact both eyes   Ears:  Normal TM's and external ear canals, both ears   Nose: Nares normal, septum midline,mucosa normal, no drainage or sinus tenderness   Throat: Lips, mucosa, and tongue normal; teeth and gums normal   Neck: Supple, symmetrical, trachea midline, no adenopathy;  thyroid: not enlarged, symmetric, no tenderness/mass/nodules; no carotid bruit or JVD   Back:   Symmetric, no curvature, ROM normal, no CVA tenderness   Lungs:   Clear to auscultation bilaterally, respirations unlabored   Heart:  Regular rate and rhythm, S1 and S2 normal, no murmur, rub, or gallop   Abdomen:   Soft, non-tender, bowel sounds active all four quadrants,  no masses, no organomegaly   Pelvic: Deferred   Extremities: Extremities normal, atraumatic, no cyanosis or edema   Pulses: 2+ and symmetric   Skin: Skin color, texture, turgor normal, no rashes or lesions   Lymph nodes: Cervical, supraclavicular, and axillary nodes normal   Neurologic: Normal       /66   Pulse 61   Temp 98 °F (36.7 °C) (Temporal)   Resp 16   Ht 5' 2\" (1.575 m)   Wt 185 lb 2 oz (84 kg)   SpO2 97%   BMI 33.86 kg/m²  Estimated body mass index is 33.86 kg/m² as calculated from the following:    Height as of this encounter: 5' 2\" (1.575 m).    Weight as of this encounter: 185 lb 2 oz (84 kg).    Medicare Hearing Assessment:   Hearing Screening    Time taken: 4/17/2024 10:13 AM  Entry User: Sammie Whitfield MD  Screening Method: Whisper Test  Whisper Test Result: Pass         Visual Acuity:   Right Eye Visual Acuity:  Corrected Right Eye Chart Acuity: 20/30   Left Eye Visual Acuity: Corrected Left Eye Chart Acuity: 20/40   Both Eyes Visual Acuity: Corrected Both Eyes Chart Acuity: 20/20   Able To Tolerate Visual Acuity: Yes        Assessment & Plan:   Lillian Sauceda is a 69 year old female who presents for a Medicare Assessment.     1. Encounter for annual health examination (Primary)  -     Comp Metabolic Panel (14); Future; Expected date: 04/17/2024  -     Lipid Panel; Future; Expected date: 04/17/2024  -     Hemoglobin A1C; Future; Expected date: 04/17/2024  -     Microalb/Creat Ratio, Random Urine; Future; Expected date: 04/17/2024  2. Type 2 diabetes mellitus with hyperglycemia, with long-term current use of insulin (HCC)  -     Comp Metabolic Panel (14); Future; Expected date: 04/17/2024  -     Lipid Panel; Future; Expected date: 04/17/2024  -     Hemoglobin A1C; Future; Expected date: 04/17/2024  -     Microalb/Creat Ratio, Random Urine; Future; Expected date: 04/17/2024  3. Postmenopausal  -     XR DEXA BONE DENSITOMETRY (CPT=77080); Future; Expected date: 04/17/2024  -     Comp Metabolic Panel (14); Future; Expected date: 04/17/2024  -     Lipid Panel; Future; Expected date: 04/17/2024  -     Hemoglobin A1C; Future; Expected date: 04/17/2024  -     Microalb/Creat Ratio, Random Urine; Future; Expected date: 04/17/2024  4. Primary hypertension  5. Personal history of colonic polyps  6. Seasonal allergic rhinitis due to pollen    1. Encounter for annual health examination  done  - CMP Now; Future  - Lipids Now; Future  - Hemoglobin A1C Now; Future  - Microalb/Creat Ratio, Random Urine Now; Future    2. Type 2 diabetes mellitus with hyperglycemia, with long-term current use of insulin (HCC)  Poorly controlled, now on increase doses of insulin  HGBA1C:    Lab Results   Component Value Date    A1C 10.1 (H) 02/23/2024    A1C 9.2 (A) 12/07/2023    A1C 9.6 (H) 07/18/2023     (H) 02/23/2024       - CMP Now; Future  - Lipids  Now; Future  - Hemoglobin A1C Now; Future  - Microalb/Creat Ratio, Random Urine Now; Future    3. Postmenopausal  Check for osteoporosis  - XR DEXA BONE DENSITOMETRY (CPT=77080); Future  - CMP Now; Future  - Lipids Now; Future  - Hemoglobin A1C Now; Future  - Microalb/Creat Ratio, Random Urine Now; Future    4. Primary hypertension  Well controlled, CCM    5. Personal history of colonic polyps  Needs colonoscopy repeated this year    6. Seasonal allergic rhinitis due to pollen  Jaquan change, starting antihistamine.      The patient indicates understanding of these issues and agrees to the plan.  Reinforced healthy diet, lifestyle, and exercise.      No follow-ups on file.     Sammie Whitfield MD, 4/17/2024     Supplementary Documentation:   General Health:  In the past six months, have you lost more than 10 pounds without trying?: 2 - No  Has your appetite been poor?: No  Type of Diet: Balanced  How does the patient maintain a good energy level?: Appropriate Exercise  How would you describe your daily physical activity?: Moderate  How would you describe your current health state?: Good  How do you maintain positive mental well-being?: Social Interaction  On a scale of 0 to 10, with 0 being no pain and 10 being severe pain, what is your pain level?: 2 - (Mild)  In the past six months, have you experienced urine leakage?: 1-Yes  At any time do you feel concerned for the safety/well-being of yourself and/or your children, in your home or elsewhere?: No  Have you had any immunizations at another office such as Influenza, Hepatitis B, Tetanus, or Pneumococcal?: No         Lillian Sauceda's SCREENING SCHEDULE   Tests on this list are recommended by your physician but may not be covered, or covered at this frequency, by your insurer.   Please check with your insurance carrier before scheduling to verify coverage.   PREVENTATIVE SERVICES FREQUENCY &  COVERAGE DETAILS LAST COMPLETION DATE   Diabetes Screening    Fasting Blood Sugar  /  Glucose    One screening every 12 months if never tested or if previously tested but not diagnosed with pre-diabetes   One screening every 6 months if diagnosed with pre-diabetes Lab Results   Component Value Date     (H) 02/23/2024        Cardiovascular Disease Screening    Lipid Panel  Cholesterol  Lipoprotein (HDL)  Triglycerides Covered every 5 years for all Medicare beneficiaries without apparent signs or symptoms of cardiovascular disease Lab Results   Component Value Date    CHOLEST 146 02/23/2024    HDL 57 02/23/2024    LDL 78 02/23/2024    TRIG 48 02/23/2024         Electrocardiogram (EKG)   Covered if needed at Welcome to Medicare, and non-screening if indicated for medical reasons 05/30/2018      Ultrasound Screening for Abdominal Aortic Aneurysm (AAA) Covered once in a lifetime for one of the following risk factors    Men who are 65-75 years old and have ever smoked    Anyone with a family history -     Colorectal Cancer Screening  Covered for ages 50-85; only need ONE of the following:    Colonoscopy   Covered every 10 years    Covered every 2 years if patient is at high risk or previous colonoscopy was abnormal 05/13/2019    Health Maintenance   Topic Date Due    Colorectal Cancer Screening  05/13/2024       Flexible Sigmoidoscopy   Covered every 4 years -    Fecal Occult Blood Test Covered annually -   Bone Density Screening    Bone density screening    Covered every 2 years after age 65 if diagnosed with risk of osteoporosis or estrogen deficiency.    Covered yearly for long-term glucocorticoid medication use (Steroids) No results found for this or any previous visit.      Health Maintenance Due   Topic Date Due    DEXA Scan  Never done      Pap and Pelvic    Pap   Covered every 2 years for women at normal risk; Annually if at high risk -  No recommendations at this time    Chlamydia Annually if high risk -  No recommendations at this time   Screening Mammogram    Mammogram     Recommend  annually for all female patients aged 40 and older    One baseline mammogram covered for patients aged 35-39 01/29/2024    Health Maintenance   Topic Date Due    Mammogram  01/29/2025       Immunizations    Influenza Covered once per flu season  Please get every year 10/23/2023  No recommendations at this time    Pneumococcal Each vaccine (Zvyelvq26 & Ppubbnoco53) covered once after 65 Prevnar 13: 03/06/2018    Jfyffqzcd21: 03/21/2023     No recommendations at this time    Hepatitis B One screening covered for patients with certain risk factors   -  No recommendations at this time    Tetanus Toxoid Not covered by Medicare Part B unless medically necessary (cut with metal); may be covered with your pharmacy prescription benefits 03/28/2005    Tetanus, Diptheria and Pertusis TD and TDaP Not covered by Medicare Part B -  No recommendations at this time    Zoster Not covered by Medicare Part B; may be covered with your pharmacy  prescription benefits -  Zoster Vaccines(1 of 2) Never done     Diabetes      Hemoglobin A1C Annually; if last result is elevated, may be asked to retest more frequently.    Medicare covers every 3 months Lab Results   Component Value Date     (H) 02/23/2024    A1C 10.1 (H) 02/23/2024       No recommendations at this time    Creat/alb ratio Annually Lab Results   Component Value Date    MICROALBCREA 97.8 (H) 02/23/2024       LDL Annually Lab Results   Component Value Date    LDL 78 02/23/2024       Dilated Eye Exam Annually Last Diabetic Eye Exam:  Last Dilated Eye Exam: 01/16/24  Eye Exam shows Diabetic Retinopathy?: No       Annual Monitoring of Persistent Medications (ACE/ARB, digoxin diuretics, anticonvulsants)    Potassium Annually Lab Results   Component Value Date    K 4.3 02/23/2024         Creatinine   Annually Lab Results   Component Value Date    CREATSERUM 0.70 02/23/2024         BUN Annually Lab Results   Component Value Date    BUN 20 02/23/2024       Drug Serum Conc  Annually No results found for: \"DIGOXIN\", \"DIG\", \"VALP\"

## 2024-04-18 PROBLEM — M65.342 TRIGGER FINGER, LEFT RING FINGER: Status: RESOLVED | Noted: 2018-07-24 | Resolved: 2024-04-18

## 2024-04-18 PROBLEM — M65.352 ACQUIRED TRIGGER FINGER OF LEFT LITTLE FINGER: Status: RESOLVED | Noted: 2022-11-10 | Resolved: 2024-04-18

## 2024-05-03 ENCOUNTER — TELEPHONE (OUTPATIENT)
Dept: FAMILY MEDICINE CLINIC | Facility: CLINIC | Age: 69
End: 2024-05-03

## 2024-05-03 NOTE — TELEPHONE ENCOUNTER
Spoke with patient.  She is requesting the name of the surgeon that she saw in the past for carpal tunnel and trigger finger.  Provider information provided to patient.    Jeremy Louis MD   [address] [place] [id number]   Phone: [phone]

## 2024-05-07 DIAGNOSIS — I10 ESSENTIAL HYPERTENSION: ICD-10-CM

## 2024-05-07 DIAGNOSIS — E11.9 TYPE 2 DIABETES MELLITUS WITHOUT COMPLICATION, WITH LONG-TERM CURRENT USE OF INSULIN (HCC): ICD-10-CM

## 2024-05-07 DIAGNOSIS — Z79.4 TYPE 2 DIABETES MELLITUS WITHOUT COMPLICATION, WITH LONG-TERM CURRENT USE OF INSULIN (HCC): ICD-10-CM

## 2024-05-07 RX ORDER — LISINOPRIL 10 MG/1
10 TABLET ORAL DAILY
Qty: 90 TABLET | Refills: 0 | Status: SHIPPED | OUTPATIENT
Start: 2024-05-07

## 2024-05-07 NOTE — TELEPHONE ENCOUNTER
Last refill: 02/12/24  qtY: 90  W/ 0 refills  Last ov: 04/17/24    Requested Prescriptions     Pending Prescriptions Disp Refills    LISINOPRIL 10 MG Oral Tab [Pharmacy Med Name: Lisinopril 10 MG Oral Tablet] 90 tablet 0     Sig: Take 1 tablet by mouth once daily     Future Appointments   Date Time Provider Department Center   6/4/2024  7:00 AM Lyndon Carolina MD [facility]   6/4/2024  1:00 PM Christine Hylton RD [facility]

## 2024-05-17 ENCOUNTER — MED REC SCAN ONLY (OUTPATIENT)
Dept: FAMILY MEDICINE CLINIC | Facility: CLINIC | Age: 69
End: 2024-05-17

## 2024-05-31 ENCOUNTER — TELEPHONE (OUTPATIENT)
Dept: FAMILY MEDICINE CLINIC | Facility: CLINIC | Age: 69
End: 2024-05-31

## 2024-05-31 NOTE — TELEPHONE ENCOUNTER
ZIA CALLED IN TO GET THE NUMBER FOR THE DIETICIAN AND WAS WONDERING IF DR LOBO WANTED HER TO COME IN FOR BLOOD WORK LEVELS -[facility]

## 2024-06-04 PROBLEM — D12.5 BENIGN NEOPLASM OF SIGMOID COLON: Status: ACTIVE | Noted: 2024-06-04

## 2024-06-04 PROBLEM — Z86.0101 HISTORY OF ADENOMATOUS POLYP OF COLON: Status: ACTIVE | Noted: 2024-06-04

## 2024-06-04 PROBLEM — Z86.010 HISTORY OF ADENOMATOUS POLYP OF COLON: Status: ACTIVE | Noted: 2024-06-04

## 2024-07-10 RX ORDER — METOPROLOL TARTRATE 50 MG/1
TABLET, FILM COATED ORAL
Qty: 180 TABLET | Refills: 0 | Status: SHIPPED | OUTPATIENT
Start: 2024-07-10

## 2024-07-10 NOTE — TELEPHONE ENCOUNTER
METOPROLOL TARTRATE 50 MG Oral Tab     Hypertension Medications Protocol Mjfkzz18/10/2024 09:16 AM   Protocol Details CMP or BMP in past 12 months    Last BP reading less than 140/90    In person appointment or virtual visit in the past 12 mos or appointment in next 3 mos    EGFRCR or GFRNAA > 50      Last office visit:  4/17/24  Future Appointments   Date Time Provider Department Center   7/22/2024  3:00 PM Christine Hylton RD [facility]   Last filled:  4/15/24  #180   Last labs:  2/23/24  eGFR:95   Last BP: 124/66

## 2024-07-22 ENCOUNTER — DIABETIC EDUCATION (OUTPATIENT)
Dept: ENDOCRINOLOGY CLINIC | Facility: CLINIC | Age: 69
End: 2024-07-22
Payer: MEDICARE

## 2024-07-22 VITALS — BODY MASS INDEX: 34 KG/M2 | WEIGHT: 187.81 LBS

## 2024-07-22 DIAGNOSIS — E11.9 TYPE 2 DIABETES MELLITUS WITHOUT COMPLICATION, WITH LONG TERM CURRENT USE OF INSULIN PUMP (HCC): Primary | ICD-10-CM

## 2024-07-22 DIAGNOSIS — Z96.41 TYPE 2 DIABETES MELLITUS WITHOUT COMPLICATION, WITH LONG TERM CURRENT USE OF INSULIN PUMP (HCC): Primary | ICD-10-CM

## 2024-07-22 NOTE — PATIENT INSTRUCTIONS
Avoid injecting into the areas of lumping and bruising on your left abdomen to help you best absorb your insulin.    Continue to work on taking your Humalog regularly and missing less doses.    Follow up with Dr. Whitfield on potentially increasing your dose of Humalog.    If your blood sugar is elevated after your meal - try to go for a 10 minute walk.

## 2024-07-22 NOTE — PROGRESS NOTES
Lillian Sauceda 3/6/1955 was seen for individual Diabetic Medical Nutrition Therapy- a follow up visit:    Date: 7/22/2024   Referral: Sammie Whitfield MD    Start time 3:35 pm  End time: 4:35 pm      Ms. Sauceda is a 69 year old female who presents today for follow up medical nutrition therapy for type 2 diabetes.      Anthropometrics:  Wt Readings from Last 6 Encounters:   07/22/24 187 lb 12.8 oz   05/17/24 186 lb   04/17/24 185 lb 2 oz   03/05/24 185 lb   12/07/23 187 lb   09/21/23 188 lb         Current diabetes medications:    Insulin:  Toujeo 76 units    Humalog 10 units with breakfast and lunch, 14 units with dinner  200-250 mg/dl = 2 units  251-300 mg/dl = 4 units  301-350 mg/dl = 6 units  > 350 mg/dl = 8 units      Injection sites: Abdomen and Upper Buttocks, sometimes Arms  Lipodystrophy: Yes  - left abdomen  Sometimes forgets to take Humalog injection a few times/week.     Discussed importance of avoiding areas with lumping and bruising for best absorption of insulin.    Labs:  Lab Results   Component Value Date    A1C 10.1 (H) 02/23/2024    A1C 9.2 (A) 12/07/2023    CHOLEST 146 02/23/2024    CHOLEST 133 03/21/2023    LDL 78 02/23/2024    LDL 62 03/21/2023    HDL 57 02/23/2024    HDL 51 03/21/2023    NONHDLC 89 02/23/2024    NONHDLC 82 03/21/2023    TRIG 48 02/23/2024    TRIG 111 03/21/2023    BUN 20 02/23/2024    BUN 15 07/18/2023    CREATSERUM 0.70 02/23/2024    CREATSERUM 0.77 07/18/2023    GFRNAA 99 12/08/2021    GFRNAA 87 03/22/2021    GFRAA 115 12/08/2021    GFRAA 101 03/22/2021       Lab Results   Component Value Date    A1C 10.1 (H) 02/23/2024    A1C 9.2 (A) 12/07/2023    A1C 9.6 (H) 07/18/2023       _______________________________________________  Continuous Glucose Monitor:    Reviewed CGMS download and patient logs:    Dexcom G7  download  -----------------------------  Reporting period: Tue Jul 9, 2024 - Mon Jul 22, 2024  -----------------------------  Glucose Details  Average glucose:  227 mg/dL  Standard deviation: 56 mg/dL  GMI: 8.7%  -----------------------------  Time in Range  Very High: 35%  High: 43%  In Range: 22%  Low: 0%  Very Low: 0%    Target Range   mg/dL    -----------------------------  CGM Details  Sensor usage: 100%  Days with CGM data: 14/14        Diet History:  Usually eats 2-3 meals/day and some snacks/day. Notes she has been trying to eat more vegetables, has keto bread with a sandwich. Has been reading nutrition facts labels.  (a.m.-9 am) Sauteed onion, zucchini, yellow squash, red pepper, small potatoes  (mid-day-1 pm) [place], carrots, cheese sticks  (p.m.-6 pm) Leftovers - couple hot dogs, no bread, couple meatballs, piece of cake  (snacks) Ice cream bar ([employer] Carb Smart lower carbs) or sugar free popsicles  (Beverages) V8 juice        Nutrition Diagnosis  PES: Altered nutrition related laboratory values related to knowledge deficit, living with diabetes as evidenced by Hyperglycemia and elevated HBA1c      Intervention  -Comprehensive Nutrition Education Provided:  Reviewed carbohydrate counting and label reading.  Reviewed recommended carbohydrate targets of 30-45 grams at meals and 15 grams at snacks.  Provided suggestions for carbohydrate controlled snacks.  Reviewed the MyPlate method with focus on balanced macronutrient consumption; identifying foods that are carbohydrates, lean protein, non-starchy vegetables, and heart healthy fats.    -Education on Increased Physical Activity:  discussed how increased physical activity improves insulin resistance, blood glucose control, and heart health  discussed trying to add activity/walking for 10 minutes after a meal    Monitoring/Evaluation  Diet modification/understanding  Medication compliance/understanding  Blood sugars  Hemoglobin A1c  Physical activity      Recommendations  Practice healthful eating patterns, emphasizing a variety of nutrient dense foods in appropriate portion sizes.    Monitor carbohydrate  intake with the MyPlate method   Practice carbohydrate counting and label reading  Improve glycemic control working towards an A1c of 7.0% or less  Start including 10 minutes of walking as able (after dinner or other meals)  Avoid injecting into areas with bruising/lumping for best absorption of insulin  Follow up with PCP if Humalog dose should be adjusted/increased      Patient Specific Goals:  Avoid injecting into the areas of lumping and bruising on your left abdomen to help you best absorb your insulin.    Continue to work on taking your Humalog regularly and missing less doses.    Follow up with Dr. Whitfield on potentially increasing your dose of Humalog.    If your blood sugar is elevated after your meal - try to go for a 10 minute walk.          Blood Glucose Goals  Blood sugar goals: less than 130 mg/dl in the morning (fasting) and less than 180 mg/dl 2 hours after meals.  Keep glucose tabs or fast acting carbohydrates with you for treatment of lows; for blood glucose levels less than 70 mg/dl, take 15 grams of fast acting carbohydrates (3-4 glucose tabs, 4 ounces of juice, or as discussed). Retest in 15 min. If not above 70 mg/dl, retreat.          Christine Hylton, KANEN, LDN, CDCES

## 2024-07-25 ENCOUNTER — TELEPHONE (OUTPATIENT)
Dept: FAMILY MEDICINE CLINIC | Facility: CLINIC | Age: 69
End: 2024-07-25

## 2024-07-25 ENCOUNTER — LABORATORY ENCOUNTER (OUTPATIENT)
Dept: LAB | Age: 69
End: 2024-07-25
Attending: INTERNAL MEDICINE
Payer: MEDICARE

## 2024-07-25 ENCOUNTER — OFFICE VISIT (OUTPATIENT)
Dept: FAMILY MEDICINE CLINIC | Facility: CLINIC | Age: 69
End: 2024-07-25
Payer: MEDICARE

## 2024-07-25 VITALS
OXYGEN SATURATION: 97 % | WEIGHT: 189.13 LBS | HEART RATE: 60 BPM | SYSTOLIC BLOOD PRESSURE: 120 MMHG | DIASTOLIC BLOOD PRESSURE: 64 MMHG | RESPIRATION RATE: 18 BRPM | TEMPERATURE: 98 F | BODY MASS INDEX: 35 KG/M2

## 2024-07-25 DIAGNOSIS — Z79.4 TYPE 2 DIABETES MELLITUS WITHOUT COMPLICATION, WITH LONG-TERM CURRENT USE OF INSULIN (HCC): ICD-10-CM

## 2024-07-25 DIAGNOSIS — E11.9 TYPE 2 DIABETES MELLITUS WITHOUT COMPLICATION, WITH LONG-TERM CURRENT USE OF INSULIN (HCC): ICD-10-CM

## 2024-07-25 DIAGNOSIS — Z78.0 POSTMENOPAUSAL: ICD-10-CM

## 2024-07-25 DIAGNOSIS — Z79.4 TYPE 2 DIABETES MELLITUS WITH HYPERGLYCEMIA, WITH LONG-TERM CURRENT USE OF INSULIN (HCC): ICD-10-CM

## 2024-07-25 DIAGNOSIS — E11.65 TYPE 2 DIABETES MELLITUS WITH HYPERGLYCEMIA, WITH LONG-TERM CURRENT USE OF INSULIN (HCC): ICD-10-CM

## 2024-07-25 DIAGNOSIS — Z00.00 ENCOUNTER FOR ANNUAL HEALTH EXAMINATION: ICD-10-CM

## 2024-07-25 LAB
ALBUMIN SERPL-MCNC: 4.4 G/DL (ref 3.2–4.8)
ALBUMIN/GLOB SERPL: 1.5 {RATIO} (ref 1–2)
ALP LIVER SERPL-CCNC: 67 U/L
ALT SERPL-CCNC: 31 U/L
ANION GAP SERPL CALC-SCNC: 2 MMOL/L (ref 0–18)
AST SERPL-CCNC: 26 U/L (ref ?–34)
BILIRUB SERPL-MCNC: 0.4 MG/DL (ref 0.2–1.1)
BUN BLD-MCNC: 17 MG/DL (ref 9–23)
CALCIUM BLD-MCNC: 9.9 MG/DL (ref 8.7–10.4)
CHLORIDE SERPL-SCNC: 103 MMOL/L (ref 98–112)
CHOLEST SERPL-MCNC: 131 MG/DL (ref ?–200)
CO2 SERPL-SCNC: 30 MMOL/L (ref 21–32)
CREAT BLD-MCNC: 0.94 MG/DL
CREAT UR-SCNC: 89.2 MG/DL
EGFRCR SERPLBLD CKD-EPI 2021: 67 ML/MIN/1.73M2 (ref 60–?)
EST. AVERAGE GLUCOSE BLD GHB EST-MCNC: 214 MG/DL (ref 68–126)
FASTING PATIENT LIPID ANSWER: YES
FASTING STATUS PATIENT QL REPORTED: YES
GLOBULIN PLAS-MCNC: 3 G/DL (ref 2–3.5)
GLUCOSE BLD-MCNC: 197 MG/DL (ref 70–99)
HBA1C MFR BLD: 9.1 % (ref ?–5.7)
HDLC SERPL-MCNC: 47 MG/DL (ref 40–59)
LDLC SERPL CALC-MCNC: 68 MG/DL (ref ?–100)
MICROALBUMIN UR-MCNC: 2.6 MG/DL
MICROALBUMIN/CREAT 24H UR-RTO: 29.1 UG/MG (ref ?–30)
NONHDLC SERPL-MCNC: 84 MG/DL (ref ?–130)
OSMOLALITY SERPL CALC.SUM OF ELEC: 287 MOSM/KG (ref 275–295)
POTASSIUM SERPL-SCNC: 4.8 MMOL/L (ref 3.5–5.1)
PROT SERPL-MCNC: 7.4 G/DL (ref 5.7–8.2)
SODIUM SERPL-SCNC: 135 MMOL/L (ref 136–145)
TRIGL SERPL-MCNC: 82 MG/DL (ref 30–149)
VLDLC SERPL CALC-MCNC: 12 MG/DL (ref 0–30)

## 2024-07-25 PROCEDURE — 80053 COMPREHEN METABOLIC PANEL: CPT

## 2024-07-25 PROCEDURE — 99214 OFFICE O/P EST MOD 30 MIN: CPT | Performed by: INTERNAL MEDICINE

## 2024-07-25 PROCEDURE — G2211 COMPLEX E/M VISIT ADD ON: HCPCS | Performed by: INTERNAL MEDICINE

## 2024-07-25 PROCEDURE — 83036 HEMOGLOBIN GLYCOSYLATED A1C: CPT

## 2024-07-25 PROCEDURE — 82570 ASSAY OF URINE CREATININE: CPT

## 2024-07-25 PROCEDURE — 82043 UR ALBUMIN QUANTITATIVE: CPT

## 2024-07-25 PROCEDURE — 80061 LIPID PANEL: CPT

## 2024-07-25 RX ORDER — FLUOCINONIDE TOPICAL SOLUTION USP, 0.05% 0.5 MG/ML
1 SOLUTION TOPICAL NIGHTLY
COMMUNITY
Start: 2024-06-11

## 2024-07-25 RX ORDER — INSULIN GLARGINE 300 U/ML
INJECTION, SOLUTION SUBCUTANEOUS
Qty: 27 ML | Refills: 3 | Status: SHIPPED | OUTPATIENT
Start: 2024-07-25

## 2024-07-25 RX ORDER — TRIAMCINOLONE ACETONIDE 1 MG/G
CREAM TOPICAL 2 TIMES DAILY PRN
Qty: 60 G | Refills: 3 | Status: SHIPPED | OUTPATIENT
Start: 2024-07-25

## 2024-07-25 NOTE — TELEPHONE ENCOUNTER
Called [facility] and notified of 10 days for the medication duration, verbalized understanding

## 2024-07-25 NOTE — TELEPHONE ENCOUNTER
PHARMACY CALLING TO GET CLARIFICATION ON triamcinolone 0.1 % External Cream. HOW LONG IS PATIENT TO USE MEDICATION?

## 2024-08-03 DIAGNOSIS — E11.9 TYPE 2 DIABETES MELLITUS WITHOUT COMPLICATION, WITH LONG-TERM CURRENT USE OF INSULIN (HCC): ICD-10-CM

## 2024-08-03 DIAGNOSIS — Z79.4 TYPE 2 DIABETES MELLITUS WITHOUT COMPLICATION, WITH LONG-TERM CURRENT USE OF INSULIN (HCC): ICD-10-CM

## 2024-08-03 DIAGNOSIS — I10 ESSENTIAL HYPERTENSION: ICD-10-CM

## 2024-08-03 RX ORDER — LISINOPRIL 10 MG/1
10 TABLET ORAL DAILY
Qty: 90 TABLET | Refills: 1 | Status: SHIPPED | OUTPATIENT
Start: 2024-08-03

## 2024-08-03 NOTE — TELEPHONE ENCOUNTER
Hypertension Medications Protocol [id number]/03/2024 09:04 AM   Protocol Details CMP or BMP in past 12 months    Last BP reading less than 140/90    In person appointment or virtual visit in the past 12 mos or appointment in next 3 mos    EGFRCR or GFRNAA > 50          Last office visit: 07/25/24  Last refill:  05/07/24  #90, no refills  Last cmp:  07/25/24  BP Readings from Last 3 Encounters:   07/25/24 120/64   04/17/24 124/66   12/07/23 122/50     No future appointments.

## 2024-08-23 RX ORDER — TRAZODONE HYDROCHLORIDE 50 MG/1
50 TABLET, FILM COATED ORAL NIGHTLY
Qty: 30 TABLET | Refills: 0 | Status: SHIPPED | OUTPATIENT
Start: 2024-08-23

## 2024-08-23 NOTE — TELEPHONE ENCOUNTER
Last office visit:4/17/24   .Last filled:8/25/23 #30 with 0 RF   Last labs:7/25/24     No future appointments.    Protocol:

## 2024-10-17 NOTE — TELEPHONE ENCOUNTER
10/17/24 1015   Seizure   Seizure Type Spontaneous   Duration (Minutes) 1   Duration (Seconds) 30   Location All Extremeties   Associated Findings related to Seizure Eyes closed;Muscle contraction and relaxation (clonic movement)   Suspected Precipitating Factors   (\"beeping on the IV pole\")     \"Nurse STAT\" was paged to patient room from VEEG monitoring room. Writer arrived to room to see patient in chair experiencing full body convulsions. Patient was sliding forward in the chair and writer assisted patient on to the floor. Patient on her buttocks on the floor while still convulsing. Writer held patient head during episode and moved any extra furniture out of the way during episode. Vitals stable (see post fall flowsheet), and . Episode lasted approximately 1.5 minutes. When patient became conscious she stated that she did not remember what happened and seemed lethargic. She also stated that she felt nauseous and felt triggered from the beeping on the IV pole.      Patient stated she was not in any pain, and was moved carefully back to bed with assistance from staff. RN checked in with VEEG room and [employer] stated that they paged Md Perez for event. RN also made sure that PHYLLIS Aguiar was made aware of episode.    Last office visit:  09/30/19  Last refill: 07/24/19  Last cmp:  06/19/19  Last bp: 09/30/19   120/72     No future appointments.

## 2024-11-05 RX ORDER — METOPROLOL TARTRATE 50 MG
TABLET ORAL
Qty: 180 TABLET | Refills: 0 | Status: SHIPPED | OUTPATIENT
Start: 2024-11-05

## 2024-11-05 NOTE — TELEPHONE ENCOUNTER
Last refill: 07/10/24  qtY: 180  W/ 0 refills  Last ov: 07/25/24    Requested Prescriptions     Pending Prescriptions Disp Refills    METOPROLOL TARTRATE 50 MG Oral Tab [Pharmacy Med Name: Metoprolol Tartrate 50 MG Oral Tablet] 180 tablet 0     Sig: Take 1 tablet by mouth twice daily     No future appointments.

## 2024-11-07 ENCOUNTER — TELEPHONE (OUTPATIENT)
Dept: FAMILY MEDICINE CLINIC | Facility: CLINIC | Age: 69
End: 2024-11-07

## 2024-11-13 ENCOUNTER — NURSE TRIAGE (OUTPATIENT)
Dept: FAMILY MEDICINE CLINIC | Facility: CLINIC | Age: 69
End: 2024-11-13

## 2024-11-13 ENCOUNTER — OFFICE VISIT (OUTPATIENT)
Dept: FAMILY MEDICINE CLINIC | Facility: CLINIC | Age: 69
End: 2024-11-13
Payer: MEDICARE

## 2024-11-13 VITALS
TEMPERATURE: 98 F | BODY MASS INDEX: 35 KG/M2 | RESPIRATION RATE: 16 BRPM | DIASTOLIC BLOOD PRESSURE: 68 MMHG | OXYGEN SATURATION: 100 % | HEART RATE: 64 BPM | SYSTOLIC BLOOD PRESSURE: 116 MMHG | WEIGHT: 189.38 LBS

## 2024-11-13 DIAGNOSIS — R10.30 LOWER ABDOMINAL PAIN: Primary | ICD-10-CM

## 2024-11-13 LAB
APPEARANCE: CLEAR
BILIRUBIN: NEGATIVE
GLUCOSE (URINE DIPSTICK): 500 MG/DL
KETONES (URINE DIPSTICK): NEGATIVE MG/DL
MULTISTIX LOT#: ABNORMAL NUMERIC
NITRITE, URINE: NEGATIVE
OCCULT BLOOD: NEGATIVE
PH, URINE: 6 (ref 4.5–8)
PROTEIN (URINE DIPSTICK): NEGATIVE MG/DL
SPECIFIC GRAVITY: 1.02 (ref 1–1.03)
URINE-COLOR: YELLOW
UROBILINOGEN,SEMI-QN: 0.2 MG/DL (ref 0–1.9)

## 2024-11-13 PROCEDURE — 99214 OFFICE O/P EST MOD 30 MIN: CPT | Performed by: INTERNAL MEDICINE

## 2024-11-13 PROCEDURE — 81003 URINALYSIS AUTO W/O SCOPE: CPT | Performed by: INTERNAL MEDICINE

## 2024-11-13 PROCEDURE — 87086 URINE CULTURE/COLONY COUNT: CPT | Performed by: INTERNAL MEDICINE

## 2024-11-13 PROCEDURE — G2211 COMPLEX E/M VISIT ADD ON: HCPCS | Performed by: INTERNAL MEDICINE

## 2024-11-13 RX ORDER — CIPROFLOXACIN 500 MG/1
500 TABLET, FILM COATED ORAL 2 TIMES DAILY
Qty: 20 TABLET | Refills: 0 | Status: SHIPPED | OUTPATIENT
Start: 2024-11-13 | End: 2024-11-23

## 2024-11-13 NOTE — TELEPHONE ENCOUNTER
Answer Assessment - Initial Assessment Questions  1. LOCATION: \"Where does it hurt?\"       Lower abdomen bilateral  2. RADIATION: \"Does the pain shoot anywhere else?\" (e.g., chest, back)      Lower back  3. ONSET: \"When did the pain begin?\" (e.g., minutes, hours or days ago)       Sunday  4. SUDDEN: \"Gradual or sudden onset?\"      Yes but had this pain months ago  5. PATTERN \"Does the pain come and go, or is it constant?\"     - If it comes and goes: \"How long does it last?\" \"Do you have pain now?\"      (Note: Comes and goes means the pain is intermittent. It goes away completely between bouts.)     - If constant: \"Is it getting better, staying the same, or getting worse?\"       (Note: Constant means the pain never goes away completely; most serious pain is constant and gets worse.)       It comes and goes and is worse with sudden movement  6. SEVERITY: \"How bad is the pain?\"  (e.g., Scale 1-10; mild, moderate, or severe)     - MILD (1-3): Doesn't interfere with normal activities, abdomen soft and not tender to touch.      - MODERATE (4-7): Interferes with normal activities or awakens from sleep, abdomen tender to touch.      - SEVERE (8-10): Excruciating pain, doubled over, unable to do any normal activities.        6  7. RECURRENT SYMPTOM: \"Have you ever had this type of stomach pain before?\" If Yes, ask: \"When was the last time?\" and \"What happened that time?\"       Yes months ago  8. CAUSE: \"What do you think is causing the stomach pain?\"      unknown  9. RELIEVING/AGGRAVATING FACTORS: \"What makes it better or worse?\" (e.g., antacids, bending or twisting motion, bowel movement)      Having regular bowel movements  10. OTHER SYMPTOMS: \"Do you have any other symptoms?\" (e.g., back pain, diarrhea, fever, urination pain, vomiting)        Blower back pain, pressure with urination but no pain  11. PREGNANCY: \"Is there any chance you are pregnant?\" \"When was your last menstrual period?\"        NA    Protocols used:  Abdominal Pain - Female-A-[place]

## 2024-11-13 NOTE — TELEPHONE ENCOUNTER
Patient has been experiencing lower abdominal pain that started Sunday night, she said that she has some gas with it.  She has had a bowel movement yesterday 11/12/24 and 11/11/24, both normal. Patient is not having any other symptoms with it and still able to eat like normal. She did state that sometimes it hurts to stand. She said that even though she is passing gas it feels like trapped gas.Patient scheduled with Dr. Dave 11/13/24 3:00pm.

## 2024-11-13 NOTE — PROGRESS NOTES
Lillian Sauceda is a 69 year old female.  HPI:   Pt has lower abdominal pain for 4 days and this has happened before, but not lasted this long. She has no fever, no chills. She has lots of gas.  Ate a whole bag of [employer] sprouts this Sunday,  She did try some GasX OTC but it did not help. Last colonoscopy was just June 4 2024.  She has had some urinary frequency and hx of kidney stones. Urine was bland with exception of some WBC's.    Current Outpatient Medications   Medication Sig Dispense Refill    ciprofloxacin (CIPRO) 500 MG Oral Tab Take 1 tablet (500 mg total) by mouth 2 (two) times daily for 10 days. 20 tablet 0    METOPROLOL TARTRATE 50 MG Oral Tab Take 1 tablet by mouth twice daily 180 tablet 0    TRAZODONE 50 MG Oral Tab Take 1 tablet by mouth nightly 30 tablet 0    lisinopril 10 MG Oral Tab Take 1 tablet by mouth once daily 90 tablet 1    Fluocinonide 0.05 % External Solution Apply 1 Application topically nightly.      triamcinolone 0.1 % External Cream Apply topically 2 (two) times daily as needed. 60 g 3    Insulin Glargine, 2 Unit Dial, (TOUJEO MAX SOLOSTAR) 300 UNIT/ML Subcutaneous Solution Pen-injector Inject daily as directed up to TDD = 76 units 27 mL 3    NON FORMULARY       Cholecalciferol (VITAMIN D3 OR) Take by mouth.      PEG 3350-KCl-Na Bicarb-NaCl 420 g Oral Recon Soln Take as directed by physician 4000 mL 0    Insulin Lispro (HUMALOG KWIKPEN) 200 UNIT/ML Subcutaneous Solution Pen-injector Inject 3x/day with meals as directed for up to TDD = 50 units 24 mL 3    cetirizine 10 MG Oral Tab Take 1 tablet (10 mg total) by mouth daily.      Loperamide HCl (ANTI-DIARRHEAL OR) Take by mouth.      Clobetasol Propionate 0.05 % External Shampoo WET HAIR, APPLY TO AFFECTED AREAS ON THE SCALP AND MASSAGE WELL. LET SIT FOR 2-3 MINUTES. RINSE WELL. USE IN THE SHOWER 2-3 TIMES A WEEK.      Insulin Pen Needle (PEN NEEDLES) 32G X 4 MM Does not apply Misc 1 each every 7 days. 6 each 0    CONTOUR NEXT TEST In  Vitro Strip Test blood sugar 3 times daily 100 each 11    Melatonin 5 MG Oral Tab Take 1 tablet (5 mg total) by mouth nightly as needed (sleep). 30 tablet 0    Glucose Blood (RELION PRIME TEST) In Vitro Strip Test blood sugar four times daily; fasting and 2 hrs after every meal. Dx: E11.9 100 strip 0    ReliOn Lancet Devices 30G Does not apply Misc 1 lancet by Does not apply route 4 (four) times daily. 200 each 0    Ascorbic Acid (VITAMIN C) 1000 MG Oral Tab Take 1 tablet (1,000 mg total) by mouth daily.  0    Cyanocobalamin (VITAMIN B-12) 2500 MCG Sublingual SL Tab Place under the tongue daily.  0    triamcinolone 55 MCG/ACT Nasal Aerosol 1 spray by Nasal route as needed.      NON FORMULARY  (Patient not taking: Reported on 2024)        Past Medical History:    Allergic rhinitis    Anxiety state    Arthritis    Back pain    Body piercing    Calculus of kidney    Depression    DM type 2 (diabetes mellitus, type 2) ([facility])    Essential hypertension    Hemorrhoids    Nausea    Pain in joints    Psoriasis    Sleep apnea    Stress    Visual impairment    glasses      Social History:  Social History     Socioeconomic History    Marital status:    Tobacco Use    Smoking status: Former     Current packs/day: 0.00     Average packs/day: 1 pack/day for 30.0 years (30.0 ttl pk-yrs)     Types: Cigarettes     Start date: 1972     Quit date: 2002     Years since quittin.8    Smokeless tobacco: Never   Vaping Use    Vaping status: Never Used   Substance and Sexual Activity    Alcohol use: No    Drug use: No     Social Drivers of Health      [facility]    Housing Stability        REVIEW OF SYSTEMS:   GENERAL HEALTH: feels well otherwise  SKIN: denies any unusual skin lesions or rashes  RESPIRATORY: denies shortness of breath with exertion  CARDIOVASCULAR: denies chest pain on exertion  GI: denies abdominal pain and denies heartburn  NEURO:  denies headaches    EXAM:   /68   Pulse 64   Temp 98 °F (36.7 °C) (Temporal)   Resp 16   Wt 189 lb 6 oz (85.9 kg)   SpO2 100%   BMI 34.64 kg/m²   GENERAL: well developed, well nourished,in no apparent distress  SKIN: no rashes,no suspicious lesions  HEENT: atraumatic, normocephalic,ears and throat are clear  NECK: supple,no adenopathy,no bruits  LUNGS: clear to auscultation  CARDIO: RRR without murmur  GI: good BS's,no masses, HSM or tenderness, no rebound, tympanitic in the upper GI tract  EXTREMITIES: no cyanosis, clubbing or edema    ASSESSMENT AND PLAN:     Encounter Diagnosis   Name Primary?    Lower abdominal pain Yes   Urine culture sent and started empiric CIPRO.     Orders Placed This Encounter   Procedures    Urine Dip, auto without Micro    Urine Culture, Routine [E]       Meds & Refills for this Visit:  Requested Prescriptions     Signed Prescriptions Disp Refills    ciprofloxacin (CIPRO) 500 MG Oral Tab 20 tablet 0     Sig: Take 1 tablet (500 mg total) by mouth 2 (two) times daily for 10 days.       Imaging & Consults:  None    Follow up as needed.     The patient indicates understanding of these issues and agrees to the plan.

## 2025-02-03 ENCOUNTER — TELEPHONE (OUTPATIENT)
Dept: FAMILY MEDICINE CLINIC | Facility: CLINIC | Age: 70
End: 2025-02-03

## 2025-02-03 DIAGNOSIS — Z79.4 TYPE 2 DIABETES MELLITUS WITHOUT COMPLICATION, WITH LONG-TERM CURRENT USE OF INSULIN (HCC): Primary | ICD-10-CM

## 2025-02-03 DIAGNOSIS — I10 PRIMARY HYPERTENSION: ICD-10-CM

## 2025-02-03 DIAGNOSIS — Z12.31 ENCOUNTER FOR SCREENING MAMMOGRAM FOR BREAST CANCER: ICD-10-CM

## 2025-02-03 DIAGNOSIS — E11.9 TYPE 2 DIABETES MELLITUS WITHOUT COMPLICATION, WITH LONG-TERM CURRENT USE OF INSULIN (HCC): Primary | ICD-10-CM

## 2025-02-03 DIAGNOSIS — I10 ESSENTIAL HYPERTENSION: ICD-10-CM

## 2025-02-03 NOTE — TELEPHONE ENCOUNTER
No diabetic retinopathy; due for recheck with me mid April; I have entered labs and she can do them anytime from NOW till then

## 2025-02-04 ENCOUNTER — MED REC SCAN ONLY (OUTPATIENT)
Dept: FAMILY MEDICINE CLINIC | Facility: CLINIC | Age: 70
End: 2025-02-04

## 2025-02-04 RX ORDER — METOPROLOL TARTRATE 50 MG
TABLET ORAL
Qty: 180 TABLET | Refills: 0 | Status: SHIPPED | OUTPATIENT
Start: 2025-02-04

## 2025-02-04 NOTE — TELEPHONE ENCOUNTER
Last refill: 11/05/24  qtY: 180  W/ 0 refills  Last ov: 11/13/24    Requested Prescriptions     Pending Prescriptions Disp Refills    METOPROLOL TARTRATE 50 MG Oral Tab [Pharmacy Med Name: Metoprolol Tartrate 50 MG Oral Tablet] 180 tablet 0     Sig: Take 1 tablet by mouth twice daily     No future appointments.

## 2025-02-10 ENCOUNTER — PATIENT OUTREACH (OUTPATIENT)
Dept: FAMILY MEDICINE CLINIC | Facility: CLINIC | Age: 70
End: 2025-02-10

## 2025-02-21 ENCOUNTER — OFFICE VISIT (OUTPATIENT)
Dept: FAMILY MEDICINE CLINIC | Facility: CLINIC | Age: 70
End: 2025-02-21
Payer: MEDICARE

## 2025-02-21 VITALS
OXYGEN SATURATION: 97 % | WEIGHT: 190.38 LBS | DIASTOLIC BLOOD PRESSURE: 68 MMHG | HEART RATE: 90 BPM | BODY MASS INDEX: 35 KG/M2 | TEMPERATURE: 98 F | SYSTOLIC BLOOD PRESSURE: 112 MMHG

## 2025-02-21 DIAGNOSIS — J06.9 UPPER RESPIRATORY INFECTION WITH COUGH AND CONGESTION: Primary | ICD-10-CM

## 2025-02-21 PROCEDURE — 99213 OFFICE O/P EST LOW 20 MIN: CPT

## 2025-02-21 RX ORDER — PREDNISONE 20 MG/1
40 TABLET ORAL DAILY
Qty: 10 TABLET | Refills: 0 | Status: SHIPPED | OUTPATIENT
Start: 2025-02-21 | End: 2025-02-26

## 2025-02-21 RX ORDER — KETOCONAZOLE 20 MG/G
1 CREAM TOPICAL 2 TIMES DAILY
COMMUNITY
Start: 2024-10-07

## 2025-02-21 RX ORDER — DESONIDE 0.5 MG/G
1 CREAM TOPICAL 2 TIMES DAILY
COMMUNITY
Start: 2024-10-07

## 2025-02-21 NOTE — PROGRESS NOTES
HPI:   Lillian is a 69 yr. Old female here today for a cough and nausea.  Patient presents today reports symptoms have been worsening for 3 days.  Has tried tylenol for her symptoms.             Current Outpatient Medications   Medication Sig Dispense Refill    desonide 0.05 % External Cream Apply 1 Application topically 2 (two) times daily.      ketoconazole 2 % External Cream Apply 1 Application topically 2 (two) times daily.      predniSONE 20 MG Oral Tab Take 2 tablets (40 mg total) by mouth daily for 5 days. 10 tablet 0    TRAZODONE 50 MG Oral Tab Take 1 tablet by mouth nightly 30 tablet 0    lisinopril 10 MG Oral Tab Take 1 tablet by mouth once daily 90 tablet 1    Fluocinonide 0.05 % External Solution Apply 1 Application topically nightly.      triamcinolone 0.1 % External Cream Apply topically 2 (two) times daily as needed. 60 g 3    Insulin Glargine, 2 Unit Dial, (TOUJEO MAX SOLOSTAR) 300 UNIT/ML Subcutaneous Solution Pen-injector Inject daily as directed up to TDD = 76 units 27 mL 3    NON FORMULARY       NON FORMULARY       Cholecalciferol (VITAMIN D3 OR) Take by mouth.      PEG 3350-KCl-Na Bicarb-NaCl 420 g Oral Recon Soln Take as directed by physician 4000 mL 0    Insulin Lispro (HUMALOG KWIKPEN) 200 UNIT/ML Subcutaneous Solution Pen-injector Inject 3x/day with meals as directed for up to TDD = 50 units 24 mL 3    cetirizine 10 MG Oral Tab Take 1 tablet (10 mg total) by mouth daily.      Insulin Pen Needle (PEN NEEDLES) 32G X 4 MM Does not apply Misc 1 each every 7 days. 6 each 0    Glucose Blood (RELION PRIME TEST) In Vitro Strip Test blood sugar four times daily; fasting and 2 hrs after every meal. Dx: E11.9 100 strip 0    ReliOn Lancet Devices 30G Does not apply Misc 1 lancet by Does not apply route 4 (four) times daily. 200 each 0    Ascorbic Acid (VITAMIN C) 1000 MG Oral Tab Take 1 tablet (1,000 mg total) by mouth daily.  0    Cyanocobalamin (VITAMIN B-12) 2500 MCG Sublingual SL Tab Place under the  tongue daily.  0      Past Medical History:    Allergic rhinitis    Anxiety state    Arthritis    Back pain    Body piercing    Calculus of kidney    Depression    DM type 2 (diabetes mellitus, type 2) ([facility])    Essential hypertension    Hemorrhoids    Nausea    Pain in joints    Psoriasis    Sleep apnea    Stress    Visual impairment    glasses      Past Surgical History:   Procedure Laterality Date    Breast biopsy  2018    Colonoscopy      Hernia surgery      Hysterectomy      lise, bso    Priscilla biopsy stereo nodule 1 site left (cpt=19081)      2018 benign     Priscilla localization wire 1 site left (cpt=19281)      2 sites, benign, can't remember when or where    Other      palate surgery for sleep apnea    Tonsillectomy        Family History   Problem Relation Age of Onset    Heart Disorder Mother         open ht. / stroke    Diabetes Mother     Heart Attack Mother     Stroke Mother     Other (Other) Mother         dialysisi    Hypertension Father     Heart Disorder Father         open ht surgery    Diabetes Father     Heart Attack Father     Diabetes Sister     Colon Polyps Sister     Diabetes Sister     Diabetes Sister     Diabetes Brother     Diabetes Maternal Grandmother     Diabetes Paternal Grandmother     Ulcerative Colitis Paternal Grandmother     Stroke Paternal Grandmother       Social History     Socioeconomic History    Marital status:    Tobacco Use    Smoking status: Former     Current packs/day: 0.00     Average packs/day: 1 pack/day for 30.0 years (30.0 ttl pk-yrs)     Types: Cigarettes     Start date: 1972     Quit date: 2002     Years since quittin.1    Smokeless tobacco: Never   Vaping Use    Vaping status: Never Used   Substance and Sexual Activity    Alcohol use: No    Drug use: No     Social Drivers of Health     Food Insecurity: No Food Insecurity (2025)    NCSS - Food Insecurity     Worried About Running Out of Food in the Last Year: No     Ran Out of Food in the  Last Year: No   Transportation Needs: No Transportation Needs (2/21/2025)    NCSS - Transportation     Lack of Transportation: No   Housing Stability: Not At Risk (2/21/2025)    NCSS - Housing/Utilities     Has Housing: Yes     Worried About Losing Housing: No     Unable to Get Utilities: No         REVIEW OF SYSTEMS:   Review of Systems   Constitutional:  Positive for chills and fatigue. Negative for fever.   HENT:  Positive for congestion, postnasal drip, rhinorrhea and sinus pressure. Negative for ear discharge, ear pain, sore throat and trouble swallowing.    Eyes: Negative.    Respiratory:  Positive for cough. Negative for chest tightness and shortness of breath.    Cardiovascular: Negative.    Gastrointestinal:  Positive for nausea. Negative for abdominal pain, constipation, diarrhea and vomiting.   Musculoskeletal:         Body aches   Neurological:  Positive for headaches. Negative for dizziness and light-headedness.       EXAM:   /68 (BP Location: Left arm, Patient Position: Sitting, Cuff Size: adult)   Pulse 90   Temp 98 °F (36.7 °C) (Temporal)   Wt 190 lb 6.4 oz (86.4 kg)   SpO2 97%   BMI 34.82 kg/m²   Physical Exam  Vitals reviewed.   Constitutional:       General: She is not in acute distress.  HENT:      Head: Atraumatic.      Right Ear: Tympanic membrane, ear canal and external ear normal.      Left Ear: Tympanic membrane, ear canal and external ear normal.      Nose: Congestion and rhinorrhea present.      Mouth/Throat:      Mouth: Mucous membranes are moist.      Pharynx: Oropharynx is clear.   Eyes:      General:         Right eye: No discharge.         Left eye: No discharge.      Conjunctiva/sclera: Conjunctivae normal.   Cardiovascular:      Rate and Rhythm: Normal rate and regular rhythm.      Heart sounds: Normal heart sounds.   Pulmonary:      Effort: Pulmonary effort is normal.      Breath sounds: Rhonchi present. No wheezing or rales.   Musculoskeletal:      Cervical back: Neck  supple.   Skin:     General: Skin is warm and dry.   Neurological:      Mental Status: She is alert and oriented to person, place, and time.         ASSESSMENT AND PLAN:   Diagnoses and all orders for this visit:    Upper respiratory infection with cough and congestion  -     predniSONE 20 MG Oral Tab; Take 2 tablets (40 mg total) by mouth daily for 5 days.     -Discussed no focal bacterial etiology identified on exam, suspect viral etiology. Viral testing deferred.  Medication sent to pharmacy.  Recommend over the counter treatment of symptoms:    -Rest. Drink plenty of fluids.  -Tylenol or ibuprofen for discomfort or fever.   -OTC decongestant (phenylephrine) expectorants (guaifenesin), nasal steroid sprays  (fluticasone) may be helpful        for congestion.  -OTC cough suppressant for cough  (dextromethorphan)  -Chloraseptic spray/throat lozenges for sore throat      Discussed follow up in office/higher level  of care if progressive symptoms or if not improving on current regimen.      Go to the ED for evaluation with progressive symptoms of difficulty swallowing, breathing, shortness of breath, chest pain, extreme weakness, or confusion.        CORNEL Bolden

## 2025-02-24 ENCOUNTER — TELEPHONE (OUTPATIENT)
Dept: FAMILY MEDICINE CLINIC | Facility: CLINIC | Age: 70
End: 2025-02-24

## 2025-02-24 NOTE — TELEPHONE ENCOUNTER
Lillian is calling she said that she is having a pain in her abdomin that had last night when she was eating but also gets it when she is not eating.  It's in the center of her stomach and sometimes it goes to the left, she also feels it in her back between her shoulder blades.  She gets this on and off.  She is also having cold symptoms and a cough, was seen by Mary 2/21/25, but not getting any better said that she is getting worse.  She does think that this pain may be her hernia that she has.  Please call.    Future Appointments   Date Time Provider Department Center   2/25/2025 10:15 AM Sammie Whitfield MD [facility]

## 2025-02-24 NOTE — TELEPHONE ENCOUNTER
Patient said that that she has been having abdominal pain at level 5 for a couple months above her umbilicus that comes and goes. She has a hiatal hernia and an umbilical hernia. She said she was diagnosed a couple years with the hiatal hernia. Dr Whitfield advised, patient advised ok to wait until tomorrow. VMarivelO. Dr Whitfield/Celsa LOO.

## 2025-02-25 ENCOUNTER — OFFICE VISIT (OUTPATIENT)
Dept: FAMILY MEDICINE CLINIC | Facility: CLINIC | Age: 70
End: 2025-02-25
Payer: MEDICARE

## 2025-02-25 VITALS
BODY MASS INDEX: 35 KG/M2 | RESPIRATION RATE: 20 BRPM | SYSTOLIC BLOOD PRESSURE: 124 MMHG | HEART RATE: 75 BPM | TEMPERATURE: 99 F | OXYGEN SATURATION: 95 % | DIASTOLIC BLOOD PRESSURE: 70 MMHG | WEIGHT: 190.13 LBS

## 2025-02-25 DIAGNOSIS — R06.2 WHEEZING: ICD-10-CM

## 2025-02-25 DIAGNOSIS — K29.00 ACUTE SUPERFICIAL GASTRITIS WITHOUT HEMORRHAGE: Primary | ICD-10-CM

## 2025-02-25 PROCEDURE — G2211 COMPLEX E/M VISIT ADD ON: HCPCS | Performed by: INTERNAL MEDICINE

## 2025-02-25 PROCEDURE — 99214 OFFICE O/P EST MOD 30 MIN: CPT | Performed by: INTERNAL MEDICINE

## 2025-02-25 RX ORDER — OMEPRAZOLE 40 MG/1
40 CAPSULE, DELAYED RELEASE ORAL DAILY
Qty: 90 CAPSULE | Refills: 0 | Status: SHIPPED | OUTPATIENT
Start: 2025-02-25 | End: 2025-05-26

## 2025-02-25 RX ORDER — ALBUTEROL SULFATE 90 UG/1
2 INHALANT RESPIRATORY (INHALATION) EVERY 6 HOURS PRN
Qty: 1 EACH | Refills: 0 | Status: SHIPPED | OUTPATIENT
Start: 2025-02-25 | End: 2025-03-27

## 2025-02-25 NOTE — PROGRESS NOTES
Lillian Sauceda is a 69 year old female.  HPI:   Pt had the flu last week, now and even prior to her viral illness, she had epigastric pain.  She has used Omeprazole in the past for a very similar feeling.  She has not change her diet, she was using a steroid.   Current Outpatient Medications   Medication Sig Dispense Refill    Omeprazole 40 MG Oral Capsule Delayed Release Take 1 capsule (40 mg total) by mouth daily. 90 capsule 0    albuterol 108 (90 Base) MCG/ACT Inhalation Aero Soln Inhale 2 puffs into the lungs every 6 (six) hours as needed. 1 each 0    LISINOPRIL 10 MG Oral Tab Take 1 tablet by mouth once daily 90 tablet 0    desonide 0.05 % External Cream Apply 1 Application topically 2 (two) times daily.      ketoconazole 2 % External Cream Apply 1 Application topically 2 (two) times daily.      TRAZODONE 50 MG Oral Tab Take 1 tablet by mouth nightly 30 tablet 0    Fluocinonide 0.05 % External Solution Apply 1 Application topically nightly.      triamcinolone 0.1 % External Cream Apply topically 2 (two) times daily as needed. 60 g 3    Insulin Glargine, 2 Unit Dial, (TOUJEO MAX SOLOSTAR) 300 UNIT/ML Subcutaneous Solution Pen-injector Inject daily as directed up to TDD = 76 units 27 mL 3    NON FORMULARY       NON FORMULARY       Cholecalciferol (VITAMIN D3 OR) Take by mouth.      PEG 3350-KCl-Na Bicarb-NaCl 420 g Oral Recon Soln Take as directed by physician 4000 mL 0    Insulin Lispro (HUMALOG KWIKPEN) 200 UNIT/ML Subcutaneous Solution Pen-injector Inject 3x/day with meals as directed for up to TDD = 50 units 24 mL 3    cetirizine 10 MG Oral Tab Take 1 tablet (10 mg total) by mouth daily.      Insulin Pen Needle (PEN NEEDLES) 32G X 4 MM Does not apply Misc 1 each every 7 days. 6 each 0    Glucose Blood (RELION PRIME TEST) In Vitro Strip Test blood sugar four times daily; fasting and 2 hrs after every meal. Dx: E11.9 100 strip 0    ReliOn Lancet Devices 30G Does not apply Misc 1 lancet by Does not apply route 4  (four) times daily. 200 each 0    Ascorbic Acid (VITAMIN C) 1000 MG Oral Tab Take 1 tablet (1,000 mg total) by mouth daily.  0    Cyanocobalamin (VITAMIN B-12) 2500 MCG Sublingual SL Tab Place under the tongue daily.  0      Past Medical History:    Allergic rhinitis    Anxiety state    Arthritis    Back pain    Body piercing    Calculus of kidney    Depression    DM type 2 (diabetes mellitus, type 2) ([facility])    Essential hypertension    Hemorrhoids    Nausea    Pain in joints    Psoriasis    Sleep apnea    Stress    Visual impairment    glasses      Social History:  Social History     Socioeconomic History    Marital status:    Tobacco Use    Smoking status: Former     Current packs/day: 0.00     Average packs/day: 1 pack/day for 30.0 years (30.0 ttl pk-yrs)     Types: Cigarettes     Start date: 1972     Quit date: 2002     Years since quittin.1    Smokeless tobacco: Never   Vaping Use    Vaping status: Never Used   Substance and Sexual Activity    Alcohol use: No    Drug use: No     Social Drivers of Health     Food Insecurity: No Food Insecurity (2025)    NCSS - Food Insecurity     Worried About Running Out of Food in the Last Year: No     Ran Out of Food in the Last Year: No   Transportation Needs: No Transportation Needs (2025)    NCSS - Transportation     Lack of Transportation: No   Housing Stability: Not At Risk (2025)    NCSS - Housing/Utilities     Has Housing: Yes     Worried About Losing Housing: No     Unable to Get Utilities: No        REVIEW OF SYSTEMS:   GENERAL HEALTH: feels well otherwise  SKIN: denies any unusual skin lesions or rashes  RESPIRATORY: denies shortness of breath with exertion  CARDIOVASCULAR: denies chest pain on exertion  GI: denies abdominal pain and denies heartburn  NEURO: denies headaches    EXAM:   /70   Pulse 75   Temp 99 °F (37.2 °C) (Temporal)   Resp 20   Wt 190 lb 2 oz (86.2 kg)   SpO2 95%   BMI 34.77 kg/m²   GENERAL: well  developed, well nourished,in no apparent distress  SKIN: no rashes,no suspicious lesions  HEENT: atraumatic, normocephalic,ears and throat are clear  NECK: supple,no adenopathy,no bruits  LUNGS: clear to auscultation  CARDIO: RRR without murmur  GI: good BS's,no masses, HSM or tenderness  EXTREMITIES: no cyanosis, clubbing or edema    ASSESSMENT AND PLAN:     Encounter Diagnoses   Name Primary?    Acute superficial gastritis without hemorrhage Yes    Wheezing    PPI for 2 months.  Wheezing from recent URI, filled albuterol    No orders of the defined types were placed in this encounter.      Meds & Refills for this Visit:  Requested Prescriptions     Signed Prescriptions Disp Refills    Omeprazole 40 MG Oral Capsule Delayed Release 90 capsule 0     Sig: Take 1 capsule (40 mg total) by mouth daily.    albuterol 108 (90 Base) MCG/ACT Inhalation Aero Soln 1 each 0     Sig: Inhale 2 puffs into the lungs every 6 (six) hours as needed.       Imaging & Consults:  None    The patient indicates understanding of these issues and agrees to the plan.  The patient is asked to return as needed

## 2025-02-26 DIAGNOSIS — E11.9 TYPE 2 DIABETES MELLITUS WITHOUT COMPLICATION, WITH LONG-TERM CURRENT USE OF INSULIN (HCC): ICD-10-CM

## 2025-02-26 DIAGNOSIS — Z79.4 TYPE 2 DIABETES MELLITUS WITHOUT COMPLICATION, WITH LONG-TERM CURRENT USE OF INSULIN (HCC): ICD-10-CM

## 2025-02-26 DIAGNOSIS — I10 ESSENTIAL HYPERTENSION: ICD-10-CM

## 2025-02-26 RX ORDER — LISINOPRIL 10 MG/1
10 TABLET ORAL DAILY
Qty: 90 TABLET | Refills: 0 | Status: SHIPPED | OUTPATIENT
Start: 2025-02-26

## 2025-02-26 NOTE — TELEPHONE ENCOUNTER
OV 02/25/25 acute  LABS 07/25/24    REFILL 08/03/24 #90 +1 RF    No future appointments.  BP Readings from Last 3 Encounters:   02/25/25 124/70   02/21/25 112/68   11/13/24 116/68

## 2025-03-11 ENCOUNTER — HOSPITAL ENCOUNTER (OUTPATIENT)
Dept: MAMMOGRAPHY | Age: 70
Discharge: HOME | End: 2025-03-11
Attending: INTERNAL MEDICINE
Payer: MEDICARE

## 2025-03-11 DIAGNOSIS — Z12.31 ENCOUNTER FOR SCREENING MAMMOGRAM FOR BREAST CANCER: ICD-10-CM

## 2025-03-11 PROCEDURE — 77063 BREAST TOMOSYNTHESIS BI: CPT | Performed by: INTERNAL MEDICINE

## 2025-03-11 PROCEDURE — 77067 SCR MAMMO BI INCL CAD: CPT | Performed by: INTERNAL MEDICINE

## 2025-04-01 ENCOUNTER — TELEPHONE (OUTPATIENT)
Dept: FAMILY MEDICINE CLINIC | Facility: CLINIC | Age: 70
End: 2025-04-01

## 2025-04-01 NOTE — TELEPHONE ENCOUNTER
Patient advised, appointment scheduled for fasting labs tomorrow at 1015am. Patient asked if she should take her insulin. Advised to hold Insulin until she can eat. GINO Chambers RN

## 2025-04-01 NOTE — TELEPHONE ENCOUNTER
She needs to be seen every 6 months, she is a little late make appt, needs fasting labs. They are already in there.

## 2025-04-01 NOTE — TELEPHONE ENCOUNTER
FYI:  she said [employer] will be sending a renewal form for Dr to sign.  She also wants to know about an A1C check

## 2025-04-02 ENCOUNTER — LABORATORY ENCOUNTER (OUTPATIENT)
Dept: LAB | Age: 70
End: 2025-04-02
Attending: INTERNAL MEDICINE
Payer: MEDICARE

## 2025-04-02 DIAGNOSIS — I10 ESSENTIAL HYPERTENSION: ICD-10-CM

## 2025-04-02 DIAGNOSIS — Z79.4 TYPE 2 DIABETES MELLITUS WITHOUT COMPLICATION, WITH LONG-TERM CURRENT USE OF INSULIN (HCC): ICD-10-CM

## 2025-04-02 DIAGNOSIS — E11.9 TYPE 2 DIABETES MELLITUS WITHOUT COMPLICATION, WITH LONG-TERM CURRENT USE OF INSULIN (HCC): ICD-10-CM

## 2025-04-02 LAB
ALBUMIN SERPL-MCNC: 4.7 G/DL (ref 3.2–4.8)
ALBUMIN/GLOB SERPL: 1.7 {RATIO} (ref 1–2)
ALP LIVER SERPL-CCNC: 65 U/L
ALT SERPL-CCNC: 48 U/L
ANION GAP SERPL CALC-SCNC: 8 MMOL/L (ref 0–18)
AST SERPL-CCNC: 35 U/L (ref ?–34)
BASOPHILS # BLD AUTO: 0.04 X10(3) UL (ref 0–0.2)
BASOPHILS NFR BLD AUTO: 0.5 %
BILIRUB SERPL-MCNC: 0.4 MG/DL (ref 0.2–1.1)
BUN BLD-MCNC: 19 MG/DL (ref 9–23)
CALCIUM BLD-MCNC: 10.5 MG/DL (ref 8.7–10.6)
CHLORIDE SERPL-SCNC: 104 MMOL/L (ref 98–112)
CHOLEST SERPL-MCNC: 147 MG/DL (ref ?–200)
CO2 SERPL-SCNC: 29 MMOL/L (ref 21–32)
CREAT BLD-MCNC: 0.79 MG/DL
CREAT UR-SCNC: 84 MG/DL
EGFRCR SERPLBLD CKD-EPI 2021: 81 ML/MIN/1.73M2 (ref 60–?)
EOSINOPHIL # BLD AUTO: 0.14 X10(3) UL (ref 0–0.7)
EOSINOPHIL NFR BLD AUTO: 1.7 %
ERYTHROCYTE [DISTWIDTH] IN BLOOD BY AUTOMATED COUNT: 13.2 %
EST. AVERAGE GLUCOSE BLD GHB EST-MCNC: 183 MG/DL (ref 68–126)
FASTING PATIENT LIPID ANSWER: YES
FASTING STATUS PATIENT QL REPORTED: YES
GLOBULIN PLAS-MCNC: 2.8 G/DL (ref 2–3.5)
GLUCOSE BLD-MCNC: 181 MG/DL (ref 70–99)
HBA1C MFR BLD: 8 % (ref ?–5.7)
HCT VFR BLD AUTO: 40.6 %
HDLC SERPL-MCNC: 53 MG/DL (ref 40–59)
HGB BLD-MCNC: 13.2 G/DL
IMM GRANULOCYTES # BLD AUTO: 0.03 X10(3) UL (ref 0–1)
IMM GRANULOCYTES NFR BLD: 0.4 %
LDLC SERPL CALC-MCNC: 75 MG/DL (ref ?–100)
LYMPHOCYTES # BLD AUTO: 2.35 X10(3) UL (ref 1–4)
LYMPHOCYTES NFR BLD AUTO: 28.5 %
MCH RBC QN AUTO: 29.2 PG (ref 26–34)
MCHC RBC AUTO-ENTMCNC: 32.5 G/DL (ref 31–37)
MCV RBC AUTO: 89.8 FL
MICROALBUMIN UR-MCNC: 3.4 MG/DL
MICROALBUMIN/CREAT 24H UR-RTO: 40.5 UG/MG (ref ?–30)
MONOCYTES # BLD AUTO: 0.61 X10(3) UL (ref 0.1–1)
MONOCYTES NFR BLD AUTO: 7.4 %
NEUTROPHILS # BLD AUTO: 5.09 X10 (3) UL (ref 1.5–7.7)
NEUTROPHILS # BLD AUTO: 5.09 X10(3) UL (ref 1.5–7.7)
NEUTROPHILS NFR BLD AUTO: 61.5 %
NONHDLC SERPL-MCNC: 94 MG/DL (ref ?–130)
OSMOLALITY SERPL CALC.SUM OF ELEC: 299 MOSM/KG (ref 275–295)
PLATELET # BLD AUTO: 201 10(3)UL (ref 150–450)
POTASSIUM SERPL-SCNC: 4.4 MMOL/L (ref 3.5–5.1)
PROT SERPL-MCNC: 7.5 G/DL (ref 5.7–8.2)
RBC # BLD AUTO: 4.52 X10(6)UL
SODIUM SERPL-SCNC: 141 MMOL/L (ref 136–145)
TRIGL SERPL-MCNC: 104 MG/DL (ref 30–149)
VLDLC SERPL CALC-MCNC: 16 MG/DL (ref 0–30)
WBC # BLD AUTO: 8.3 X10(3) UL (ref 4–11)

## 2025-04-02 PROCEDURE — 83036 HEMOGLOBIN GLYCOSYLATED A1C: CPT

## 2025-04-02 PROCEDURE — 80053 COMPREHEN METABOLIC PANEL: CPT

## 2025-04-02 PROCEDURE — 80061 LIPID PANEL: CPT

## 2025-04-02 PROCEDURE — 82043 UR ALBUMIN QUANTITATIVE: CPT

## 2025-04-02 PROCEDURE — 85025 COMPLETE CBC W/AUTO DIFF WBC: CPT

## 2025-04-02 PROCEDURE — 36415 COLL VENOUS BLD VENIPUNCTURE: CPT

## 2025-04-02 PROCEDURE — 82570 ASSAY OF URINE CREATININE: CPT

## 2025-04-07 ENCOUNTER — TELEPHONE (OUTPATIENT)
Dept: FAMILY MEDICINE CLINIC | Facility: CLINIC | Age: 70
End: 2025-04-07

## 2025-05-01 ENCOUNTER — TELEPHONE (OUTPATIENT)
Dept: FAMILY MEDICINE CLINIC | Facility: CLINIC | Age: 70
End: 2025-05-01

## 2025-05-01 NOTE — TELEPHONE ENCOUNTER
Patient given results from labs done on 4/2/25 and advised the results were faxed to her endocrinologist Dr Craven.

## 2025-05-09 ENCOUNTER — MED REC SCAN ONLY (OUTPATIENT)
Dept: FAMILY MEDICINE CLINIC | Facility: CLINIC | Age: 70
End: 2025-05-09

## 2025-05-12 RX ORDER — METOPROLOL TARTRATE 50 MG
50 TABLET ORAL 2 TIMES DAILY
Qty: 180 TABLET | Refills: 0 | Status: SHIPPED | OUTPATIENT
Start: 2025-05-12

## 2025-05-12 NOTE — TELEPHONE ENCOUNTER
Last refill: 02/04/25  Qty 180  W/ 0 refills  Last ov: 11/13/24    Requested Prescriptions     Pending Prescriptions Disp Refills    METOPROLOL TARTRATE 50 MG Oral Tab [Pharmacy Med Name: Metoprolol Tartrate 50 MG Oral Tablet] 180 tablet 0     Sig: Take 1 tablet by mouth twice daily     Future Appointments   Date Time Provider Department Center   5/20/2025  3:45 PM Sammie Whitfield MD [facility]

## 2025-05-20 ENCOUNTER — OFFICE VISIT (OUTPATIENT)
Dept: FAMILY MEDICINE CLINIC | Facility: CLINIC | Age: 70
End: 2025-05-20
Payer: MEDICARE

## 2025-05-20 VITALS
HEIGHT: 62 IN | HEART RATE: 78 BPM | WEIGHT: 195.38 LBS | OXYGEN SATURATION: 97 % | RESPIRATION RATE: 20 BRPM | BODY MASS INDEX: 35.95 KG/M2 | TEMPERATURE: 98 F | SYSTOLIC BLOOD PRESSURE: 118 MMHG | DIASTOLIC BLOOD PRESSURE: 70 MMHG

## 2025-05-20 DIAGNOSIS — E11.65 TYPE 2 DIABETES MELLITUS WITH HYPERGLYCEMIA, WITH LONG-TERM CURRENT USE OF INSULIN (HCC): ICD-10-CM

## 2025-05-20 DIAGNOSIS — Z79.4 TYPE 2 DIABETES MELLITUS WITH HYPERGLYCEMIA, WITH LONG-TERM CURRENT USE OF INSULIN (HCC): ICD-10-CM

## 2025-05-20 DIAGNOSIS — Z00.00 ENCOUNTER FOR ANNUAL HEALTH EXAMINATION: Primary | ICD-10-CM

## 2025-05-20 DIAGNOSIS — K57.30 DIVERTICULOSIS OF LARGE INTESTINE WITHOUT PERFORATION OR ABSCESS WITHOUT BLEEDING: ICD-10-CM

## 2025-05-20 DIAGNOSIS — E66.01 SEVERE OBESITY (BMI 35.0-39.9) WITH COMORBIDITY (HCC): Chronic | ICD-10-CM

## 2025-05-20 DIAGNOSIS — Z86.0101 HISTORY OF ADENOMATOUS POLYP OF COLON: ICD-10-CM

## 2025-05-20 DIAGNOSIS — I10 PRIMARY HYPERTENSION: ICD-10-CM

## 2025-05-20 PROBLEM — E10.65 TYPE 1 DIABETES MELLITUS WITH HYPERGLYCEMIA (HCC): Status: ACTIVE | Noted: 2025-05-20

## 2025-05-20 NOTE — PROGRESS NOTES
Subjective:   Lillian Sauceda is a 70 year old female who presents for a Medicare Wellness Visit charge within the last 11 months and Patient may not meet criteria for AWV: Please evaluate for correct coding and scheduled follow up of multiple significant but stable problems.             Pt has been using insulin, she sleeps in a recliner. Hurts to lay in her left shoulder and right hip.  She is up once or twice to the bathroom.  She drinks water and 4 x a year a diet root beer. She is still active working as a  for the [employer] 5 days a week.   History/Other:   Fall Risk Assessment:   She has been screened for Falls and is low risk.      Cognitive Assessment:   She had a completely normal cognitive assessment - see flowsheet entries     Functional Ability/Status:   Lillian Sauceda has a completely normal functional assessment. See flowsheet for details.      Depression Screening (PHQ):  PHQ-2 SCORE: 0  , done 5/20/2025   Last [place] Suicide Screening on 5/20/2025 was No Risk.     <5 minutes spent screening and counseling for depression    Advanced Directives:   She does NOT have a Living Will. [Do you have a living will?: No]  She does NOT have a Power of  for Health Care. [Do you have a healthcare power of ?: No]  Discussed Advance Care Planning with patient (and family/surrogate if present). Standard forms made available to patient in After Visit Summary.      Patient Active Problem List   Diagnosis    Type 2 diabetes mellitus with hyperglycemia, with long-term current use of insulin (HCC)    HTN (hypertension)    Allergic rhinitis    Personal history of colonic polyps    Polyp of colon    Diverticulosis of large intestine without perforation or abscess without bleeding    Other hemorrhoids    Benign neoplasm of rectosigmoid junction    History of adenomatous polyp of colon    Benign neoplasm of sigmoid colon    Lower abdominal pain    Severe obesity (BMI 35.0-39.9) with comorbidity  ([facility])     Allergies:  She is allergic to radiology contrast iodinated dyes, contrast dye [gadolinium derivatives], augmentin [amoxicillin-pot clavulanate], and erythromycin.    Current Medications:  Outpatient Medications Marked as Taking for the 5/20/25 encounter (Office Visit) with Sammie Whitfield MD   Medication Sig    METOPROLOL TARTRATE 50 MG Oral Tab Take 1 tablet by mouth twice daily    LISINOPRIL 10 MG Oral Tab Take 1 tablet by mouth once daily    Omeprazole 40 MG Oral Capsule Delayed Release Take 1 capsule (40 mg total) by mouth daily.    desonide 0.05 % External Cream Apply 1 Application topically 2 (two) times daily.    ketoconazole 2 % External Cream Apply 1 Application topically 2 (two) times daily.    TRAZODONE 50 MG Oral Tab Take 1 tablet by mouth nightly    Fluocinonide 0.05 % External Solution Apply 1 Application topically nightly.    triamcinolone 0.1 % External Cream Apply topically 2 (two) times daily as needed.    Insulin Glargine, 2 Unit Dial, (TOUJEO MAX SOLOSTAR) 300 UNIT/ML Subcutaneous Solution Pen-injector Inject daily as directed up to TDD = 76 units    NON FORMULARY     NON FORMULARY     Cholecalciferol (VITAMIN D3 OR) Take by mouth.    Insulin Lispro (HUMALOG KWIKPEN) 200 UNIT/ML Subcutaneous Solution Pen-injector Inject 3x/day with meals as directed for up to TDD = 50 units    cetirizine 10 MG Oral Tab Take 1 tablet (10 mg total) by mouth daily.    Insulin Pen Needle (PEN NEEDLES) 32G X 4 MM Does not apply Misc 1 each every 7 days.    Glucose Blood (RELION PRIME TEST) In Vitro Strip Test blood sugar four times daily; fasting and 2 hrs after every meal. Dx: E11.9    ReliOn Lancet Devices 30G Does not apply Misc 1 lancet by Does not apply route 4 (four) times daily.    Ascorbic Acid (VITAMIN C) 1000 MG Oral Tab Take 1 tablet (1,000 mg total) by mouth daily.    Cyanocobalamin (VITAMIN B-12) 2500 MCG Sublingual SL Tab Place under the tongue daily.       Medical History:  She  has a past  medical history of Allergic rhinitis, Anxiety state, Arthritis, Back pain, Body piercing, Calculus of kidney, Depression, DM type 2 (diabetes mellitus, type 2) (HCC), Essential hypertension, Hemorrhoids, Nausea, Pain in joints, Psoriasis, Sleep apnea, Stress, and Visual impairment.  Surgical History:  She  has a past surgical history that includes hysterectomy; tonsillectomy; other; colonoscopy; mari localization wire 1 site left (cpt=19281); Breast biopsy (07/06/2018); hernia surgery; and mari biopsy stereo nodule 1 site left (cpt=19081).   Family History:  Her family history includes Colon Polyps in her sister; Diabetes in her brother, father, maternal grandmother, mother, paternal grandmother, sister, sister, and sister; Heart Attack in her father and mother; Heart Disorder in her father and mother; Hypertension in her father; Other in her mother; Stroke in her mother and paternal grandmother; Ulcerative Colitis in her paternal grandmother.  Social History:  She  reports that she quit smoking about 23 years ago. Her smoking use included cigarettes. She started smoking about 53 years ago. She has a 30 pack-year smoking history. She has never used smokeless tobacco. She reports that she does not drink alcohol and does not use drugs.    Tobacco:  She smoked tobacco in the past but quit greater than 12 months ago.  Tobacco Use[1]     CAGE Alcohol Screen:   CAGE screening score of 0 on 5/20/2025, showing low risk of alcohol abuse.      Patient Care Team:  Sammie Whitfield MD as PCP - General (Family Medicine)  Laury Bocanegra RN, MAUREEN as Registered Nurse (Registered Nurse)  Christine Hylton RD as Dietician (Dietitian)  Mary Mac APRN (Nurse Practitioner Family)    Review of Systems  GENERAL: feels well otherwise  SKIN: denies any unusual skin lesions  EYES: denies blurred vision or double vision  HEENT: denies nasal congestion, sinus pain or ST  LUNGS: denies shortness of breath with exertion  CARDIOVASCULAR:  denies chest pain on exertion  GI: denies abdominal pain, denies heartburn  : denies dysuria, vaginal discharge or itching, no complaint of urinary incontinence   MUSCULOSKELETAL: denies back pain  NEURO: denies headaches  PSYCHE: denies depression or anxiety  HEMATOLOGIC: denies hx of anemia  ENDOCRINE: denies thyroid history  ALL/ASTHMA: denies hx of allergy or asthma    Objective:   Physical Exam  General Appearance:  Alert, cooperative, no distress, appears stated age   Head:  Normocephalic, without obvious abnormality, atraumatic   Eyes:  PERRL, conjunctiva/corneas clear, EOM's intact both eyes   Ears:  Normal TM's and external ear canals, both ears   Nose: Nares normal, septum midline,mucosa normal, no drainage or sinus tenderness   Throat: Lips, mucosa, and tongue normal; teeth and gums normal   Neck: Supple, symmetrical, trachea midline, no adenopathy;  thyroid: not enlarged, symmetric, no tenderness/mass/nodules; no carotid bruit or JVD   Back:   Symmetric, no curvature, ROM normal, no CVA tenderness   Lungs:   Clear to auscultation bilaterally, respirations unlabored   Heart:  Regular rate and rhythm, S1 and S2 normal, no murmur, rub, or gallop   Abdomen:   Soft, non-tender, bowel sounds active all four quadrants,  no masses, no organomegaly   Pelvic: Deferred   Extremities: Extremities normal, atraumatic, no cyanosis or edema   Pulses: 2+ and symmetric   Skin: Skin color, texture, turgor normal, no rashes or lesions   Lymph nodes: Cervical, supraclavicular, and axillary nodes normal   Neurologic: Normal       /70   Pulse 78   Temp 97.9 °F (36.6 °C) (Temporal)   Resp 20   Ht 5' 2\" (1.575 m)   Wt 195 lb 6 oz (88.6 kg)   SpO2 97%   BMI 35.73 kg/m²  Estimated body mass index is 35.73 kg/m² as calculated from the following:    Height as of this encounter: 5' 2\" (1.575 m).    Weight as of this encounter: 195 lb 6 oz (88.6 kg).    Medicare Hearing Assessment:   Hearing Screening    Time taken:  5/20/2025  4:01 PM  Entry User: Sammie Whitfield MD  Screening Method: Whisper Test  Whisper Test Result: Pass         Visual Acuity:   Right Eye Visual Acuity: Corrected Right Eye Chart Acuity: 20/25   Left Eye Visual Acuity: Corrected Left Eye Chart Acuity: 20/25   Both Eyes Visual Acuity: Corrected Both Eyes Chart Acuity: 20/25   Able To Tolerate Visual Acuity: Yes        Assessment & Plan:   Lillian Sauceda is a 70 year old female who presents for a Medicare Assessment.     1. Encounter for annual health examination (Primary)  2. Type 2 diabetes mellitus with hyperglycemia, with long-term current use of insulin (HCC)  3. Primary hypertension  4. History of adenomatous polyp of colon  5. Diverticulosis of large intestine without perforation or abscess without bleeding  6. Type 1 diabetes mellitus with hyperglycemia (HCC)  7. Severe obesity (BMI 35.0-39.9) with comorbidity (HCC)          1. Encounter for annual health examination  done    2. Obesity  GLP 1 cost prohibitive, insulin daily.     3. Primary hypertension  Well controlled, CCM     4. History of adenomatous polyp of colon  She just had a follow up colonoscopy last year    5. Diverticulosis of large intestine without perforation or abscess without bleeding  No inflammation and no pain.     6. Type 2 diabetes mellitus with hyperglycemia (HCC)  Sees endocrine, GLP 1 too expensive, A1C not optimal 8%   Inject Toujeo (Long acting Insulin) 50 units twice daily.     Check BG before meals everyday before injecting Insulin.    Inject Humalog (Meal time Insulin )- 30 insulin units before meals and additional insulin per correctional scale below.    Inject meal time insulin atleast 10-15 minutes BEFORE the meals.     Premeal BG Units of Meal time Insulin   151-200 + 2 units   201-250 + 4 units   251-300 + 6 units   301-350 + 8 units   351-400 +10 units   Over 400 +12 units     If pre meal Blood glucose is less than 90 mg/dl DO NOT inject Humalog Insulin.    If pre  meal blood glucose is between  mg/dl OR if you eat a small portion sizes meals inject only 15 units of Humalog.    Compliant and being consistent with recommended diet and staying physically active is important for optimal control of DM.   The patient indicates understanding of these issues and agrees to the plan.  Reinforced healthy diet, lifestyle, and exercise.      No follow-ups on file.     Sammie Whitfield MD, 5/20/2025     Supplementary Documentation:   General Health:  In the past six months, have you lost more than 10 pounds without trying?: 2 - No  Has your appetite been poor?: No  Type of Diet: Balanced  How does the patient maintain a good energy level?: Appropriate Exercise  How would you describe your daily physical activity?: Moderate  How would you describe your current health state?: Good  How do you maintain positive mental well-being?: Social Interaction  On a scale of 0 to 10, with 0 being no pain and 10 being severe pain, what is your pain level?: 3 - (Mild)  In the past six months, have you experienced urine leakage?: 1-Yes  At any time do you feel concerned for the safety/well-being of yourself and/or your children, in your home or elsewhere?: No  Have you had any immunizations at another office such as Influenza, Hepatitis B, Tetanus, or Pneumococcal?: No    Health Maintenance   Topic Date Due    Zoster Vaccines (1 of 2) Never done    DEXA Scan  Never done    COVID-19 Vaccine (4 - 2024-25 season) 09/01/2024    Diabetes Care Foot Exam  12/07/2024    Annual Depression Screening  01/01/2025    Annual Physical  04/17/2025    Diabetes Care A1C  07/02/2025    Influenza Vaccine (Season Ended) 10/01/2025    Diabetes Care Dilated Eye Exam  02/03/2026    Mammogram  03/11/2026    Diabetes Care: GFR  04/02/2026    Colorectal Cancer Screening  06/04/2029    Fall Risk Screening (Annual)  Completed    Diabetes Care: Microalb/Creat Ratio (Annual)  Completed    Pneumococcal Vaccine: 50+ Years  Completed     Meningococcal B Vaccine  Aged Out          [1]   Social History  Tobacco Use   Smoking Status Former    Current packs/day: 0.00    Average packs/day: 1 pack/day for 30.0 years (30.0 ttl pk-yrs)    Types: Cigarettes    Start date: 1972    Quit date: 2002    Years since quittin.4   Smokeless Tobacco Never

## 2025-05-21 PROBLEM — E10.65 TYPE 1 DIABETES MELLITUS WITH HYPERGLYCEMIA (HCC): Status: RESOLVED | Noted: 2025-05-20 | Resolved: 2025-05-21

## 2025-05-28 DIAGNOSIS — I10 ESSENTIAL HYPERTENSION: ICD-10-CM

## 2025-05-28 DIAGNOSIS — Z79.4 TYPE 2 DIABETES MELLITUS WITHOUT COMPLICATION, WITH LONG-TERM CURRENT USE OF INSULIN (HCC): ICD-10-CM

## 2025-05-28 DIAGNOSIS — E11.9 TYPE 2 DIABETES MELLITUS WITHOUT COMPLICATION, WITH LONG-TERM CURRENT USE OF INSULIN (HCC): ICD-10-CM

## 2025-05-28 RX ORDER — LISINOPRIL 10 MG/1
10 TABLET ORAL DAILY
Qty: 90 TABLET | Refills: 0 | Status: SHIPPED | OUTPATIENT
Start: 2025-05-28

## 2025-05-28 NOTE — TELEPHONE ENCOUNTER
OV 05/20/25  LABS 04/02/25 COMP    REFILL 02/26/25 #90    No future appointments.    BP Readings from Last 3 Encounters:   05/20/25 118/70   02/25/25 124/70   02/21/25 112/68

## 2025-08-06 RX ORDER — METOPROLOL TARTRATE 50 MG
50 TABLET ORAL 2 TIMES DAILY
Qty: 180 TABLET | Refills: 0 | Status: SHIPPED | OUTPATIENT
Start: 2025-08-06

## 2025-08-24 DIAGNOSIS — E11.9 TYPE 2 DIABETES MELLITUS WITHOUT COMPLICATION, WITH LONG-TERM CURRENT USE OF INSULIN (HCC): ICD-10-CM

## 2025-08-24 DIAGNOSIS — Z79.4 TYPE 2 DIABETES MELLITUS WITHOUT COMPLICATION, WITH LONG-TERM CURRENT USE OF INSULIN (HCC): ICD-10-CM

## 2025-08-24 DIAGNOSIS — I10 ESSENTIAL HYPERTENSION: ICD-10-CM

## 2025-08-25 RX ORDER — LISINOPRIL 10 MG/1
10 TABLET ORAL DAILY
Qty: 90 TABLET | Refills: 0 | Status: SHIPPED | OUTPATIENT
Start: 2025-08-25

## (undated) DIAGNOSIS — E11.9 TYPE 2 DIABETES MELLITUS WITHOUT COMPLICATION, WITH LONG-TERM CURRENT USE OF INSULIN (HCC): ICD-10-CM

## (undated) DIAGNOSIS — Z79.4 TYPE 2 DIABETES MELLITUS WITHOUT COMPLICATION, WITH LONG-TERM CURRENT USE OF INSULIN (HCC): ICD-10-CM

## (undated) DEVICE — SOL  .9 1000ML BTL

## (undated) DEVICE — SUTURE ETHIBOND EXCEL 2-0 SH

## (undated) DEVICE — SUTURE VICRYL 3-0 SH

## (undated) DEVICE — GLOVE BIOGEL M SURG SZ 71/2

## (undated) DEVICE — KENDALL SCD EXPRESS SLEEVES, KNEE LENGTH, MEDIUM: Brand: KENDALL SCD

## (undated) DEVICE — SUTURE MONOCRYL 4-0 PS-2

## (undated) DEVICE — BREAST-HERNIA-PORT CDS-LF: Brand: MEDLINE INDUSTRIES, INC.

## (undated) NOTE — Clinical Note
Nicolette Stubbs saw Lupis Chao in the office today. As you know she presents with an umbilical hernia. I am scheduling her for repair.   Thank you Park Andrade

## (undated) NOTE — MR AVS SNAPSHOT
[facility] [place] [id number] [phone]               Thank you for choosing us for your health care visit with Jorge Jordan RN,CDE.   We are glad to serve you and happy to provide you with this summa necessary. INDIVIDUAL FOLLOW-UP APPOINTMENTS To be determined at appointment  INITIAL INDIVIDUAL ASSESSMENTS An assessment and meter training will begin this appointment. Educational needs will be discussed at that time.   STEP ONE SATISFIER This is a 1-ho Commonly known as:  PROAIR HFA           ALPRAZolam 0.25 MG Tabs   Take 1 tablet (0.25 mg total) by mouth every 6 (six) hours as needed for Anxiety. Commonly known as:  XANAX           Aspirin 81 MG Tbdp   Take 81 mg by mouth daily.            RIZWAN CONTO your doctor or other care provider to review them with you. MyChart     Visit [facility]  You can access your MyChart to more actively manage your health care and view more details from this visit by going to [email or web address].   If you'v

## (undated) NOTE — LETTER
Date: 2/25/2025    Patient Name: Lillian Sauceda          To Whom it may concern:    The above patient was seen at [facility] for treatment of a medical condition.    This patient should be excused from attending work from 2/18-2/25 due to illness.    The patient may return to work 2/26/2025.        Sincerely,    Sammie Whitfield MD

## (undated) NOTE — MR AVS SNAPSHOT
[facility] [place] [id number] [phone]               Thank you for choosing us for your health care visit with Maci Townsend, RN,CDE.   We are glad to serve you and happy to provide you with this summa at that time. STEP ONE SATISFIER This is a 1-hour appointment taught by a registered dietitian and diabetes educator. Half of this appointment time is dedicated to the development of a personalized education plan.   This plan is developed privately in a 1: Inhale 2 puffs into the lungs every 6 (six) hours as needed for Wheezing. Commonly known as:  PROAIR HFA           ALPRAZolam 0.25 MG Tabs   Take 1 tablet (0.25 mg total) by mouth every 6 (six) hours as needed for Anxiety.    Commonly known as:  [facility] and this prescription was added. Make sure you understand how and when to take each. Commonly known as:  GLUCOPHAGE-XR           Metoprolol Tartrate 50 MG Tabs   Take 1 tablet (50 mg total) by mouth 2 (two) times daily.    Commonly known as:  LOPRESSOR

## (undated) NOTE — LETTER
Date: 11/13/2024    Patient Name: Lillian Sauceda          To Whom it may concern:    The above patient was seen at [facility] for treatment of a medical condition.    This patient should be excused from attending work from 11/11 through 11/13/2024.    The patient may return to work 11/14/2024        Sincerely,    Sammie Whitfield MD

## (undated) NOTE — MR AVS SNAPSHOT
[facility]  [address] [id number]  [phone]               Thank you for choosing us for your health care visit with Marques Robles DO.   We are glad to serve you and happy to provide you with this summ Assoc Dx:  Uncontrolled type 2 diabetes mellitus without complication, with long-term current use of insulin [facility] [E11.65, Z79.4]           Referral to General Surgery- Margaretmary Goldberg    Complete by:  As directed    Assoc Dx:  Umbilical hernia without obs then schedule your appointment. Failure to obtain required authorization numbers can create reimbursement issues, leaving you responsible for the balance.      It is your responsibility to call your insurance to verify that they cover  Diabetes Education an Erythromycin     Iodine (Topical)                 Today's Vital Signs     BP Pulse Temp Height Weight BMI    120/70 mmHg 80 98 °F (36.7 °C) (Tympanic) 62\" 193 lb 8 oz 35.38 kg/m2         Current Medications          This list is accurate as of: 5/15/17 1 - Metoprolol Tartrate 50 MG Tabs      You can get these medications from any pharmacy     Bring a paper prescription for each of these medications    - ALPRAZolam 0.25 MG Tabs            Results of Recent Testing       MyChart     Visit MyChart  You can ac Get your heart pumping – brisk walking, biking, swimming Even 10 minute increments are effective and add up over the week   2 ½ hours per week – spread out over time Use a rocky to keep you motivated   Don’t forget strength training with weights and resist

## (undated) NOTE — LETTER
Date: 2/1/2023    Patient Name: Tony Schmidt          To Whom it may concern: The above patient was seen at the [facility] for treatment of a medical condition. This patient should be excused from attending work from 1/26/2023 through 2/2/2023. The patient may return to work 2/6/2023.         Sincerely,    Lena Maldonado MD

## (undated) NOTE — Clinical Note
Ely Go saw Federico Quevedo in the office. As you know she has an abnormal mammogram for which stereotactic biopsy is recommended. She and I had a lengthy discussion regarding the procedure.   Thank you Leana Frye

## (undated) NOTE — Clinical Note
Lucy Alvarado saw All Wise in the office today. She has a nonhealing skin lesion of her right gluteus. I excised this in the office today.   Thank you Jesus Alberto Zhang